# Patient Record
Sex: FEMALE | Race: WHITE | NOT HISPANIC OR LATINO | Employment: UNEMPLOYED | URBAN - METROPOLITAN AREA
[De-identification: names, ages, dates, MRNs, and addresses within clinical notes are randomized per-mention and may not be internally consistent; named-entity substitution may affect disease eponyms.]

---

## 2017-09-18 ENCOUNTER — LAB CONVERSION - ENCOUNTER (OUTPATIENT)
Dept: OTHER | Facility: OTHER | Age: 52
End: 2017-09-18

## 2017-09-18 ENCOUNTER — ALLSCRIPTS OFFICE VISIT (OUTPATIENT)
Dept: OTHER | Facility: OTHER | Age: 52
End: 2017-09-18

## 2017-09-18 ENCOUNTER — LAB REQUISITION (OUTPATIENT)
Dept: LAB | Facility: HOSPITAL | Age: 52
End: 2017-09-18
Payer: COMMERCIAL

## 2017-09-18 DIAGNOSIS — Z12.31 ENCOUNTER FOR SCREENING MAMMOGRAM FOR MALIGNANT NEOPLASM OF BREAST: ICD-10-CM

## 2017-09-18 DIAGNOSIS — Z01.419 ENCOUNTER FOR GYNECOLOGICAL EXAMINATION WITHOUT ABNORMAL FINDING: ICD-10-CM

## 2017-09-18 DIAGNOSIS — R30.0 DYSURIA: ICD-10-CM

## 2017-09-18 LAB
BILIRUB UR QL STRIP: NEGATIVE
COLOR UR: YELLOW
COMMENT (HISTORICAL): CLEAR
FECAL OCCULT BLOOD DIAGNOSTIC (HISTORICAL): NEGATIVE
GLUCOSE (HISTORICAL): NEGATIVE
KETONES UR STRIP-MCNC: NEGATIVE MG/DL
LEUKOCYTE ESTERASE UR QL STRIP: NEGATIVE
NITRITE UR QL STRIP: NEGATIVE
PH UR STRIP.AUTO: 8 [PH] (ref 5–8)
PROT UR STRIP-MCNC: NEGATIVE MG/DL
SP GR UR STRIP.AUTO: 1.01 (ref 1–1.03)

## 2017-09-18 PROCEDURE — G0145 SCR C/V CYTO,THINLAYER,RESCR: HCPCS | Performed by: OBSTETRICS & GYNECOLOGY

## 2017-09-18 PROCEDURE — 87624 HPV HI-RISK TYP POOLED RSLT: CPT | Performed by: OBSTETRICS & GYNECOLOGY

## 2017-09-19 ENCOUNTER — LAB CONVERSION - ENCOUNTER (OUTPATIENT)
Dept: OTHER | Facility: OTHER | Age: 52
End: 2017-09-19

## 2017-09-19 LAB
BILIRUB UR QL STRIP: NEGATIVE
COLOR UR: YELLOW
COMMENT (HISTORICAL): CLEAR
CULTURE RESULT (HISTORICAL): NORMAL
FECAL OCCULT BLOOD DIAGNOSTIC (HISTORICAL): NEGATIVE
GLUCOSE (HISTORICAL): NEGATIVE
KETONES UR STRIP-MCNC: NEGATIVE MG/DL
LEUKOCYTE ESTERASE UR QL STRIP: NEGATIVE
NITRITE UR QL STRIP: NEGATIVE
PH UR STRIP.AUTO: 8 [PH] (ref 5–8)
PROT UR STRIP-MCNC: NEGATIVE MG/DL
SP GR UR STRIP.AUTO: 1.01 (ref 1–1.03)

## 2017-09-22 LAB — HPV RRNA GENITAL QL NAA+PROBE: NORMAL

## 2017-09-25 LAB
LAB AP GYN PRIMARY INTERPRETATION: NORMAL
LAB AP LMP: NORMAL
Lab: NORMAL

## 2017-10-26 NOTE — PROGRESS NOTES
Assessment  1  Encounter for gynecological examination () (Z58 507)    Discussion/Summary    #1  Pap smear done as well as as annualEncouraged self breast examination as well as calcium supplementationRecommend annual mammogram, discussed 3-D mammography given breast density  Patient will check cc covered by her insuranceRecommend clean-catch urinalysis culture and sensitivity  If this is normal I would then recommend evaluation with urogynecology regarding stress urinary incontinenceReturn to office in one year  The patient has the current Goals: Goals met  The patent has the current Barriers: No barriers  Patient is able to Self-Care  Self Referrals: Yes      Chief Complaint  annual visit      History of Present Illness  HPI: This is a 36-year-old female  who presents for her annual GYN exam  She states her menstrual cycles are regular every 4 weeks lasting 3-5 days with no breakthrough bleeding  Over the last several months she's had bladder symptoms including difficulty voiding as well as stress urinary incontinence  She denies any blood in her urine  She does have a history of-year-old bowel syndrome  She is overdue for her mammogram  She's never had a screening colonoscopy  is sexually active and has been in a monogamous relationship with her  for the last 24 years  Review of Systems    Cardiovascular: no complaints of slow or fast heart rate, no chest pain, no palpitations, no leg claudication or lower extremity edema  Respiratory: no complaints of shortness of breath, no wheezing, no dyspnea on exertion, no orthopnea or PND  Breasts: no complaints of breast pain, breast lump or nipple discharge  Gastrointestinal: as noted in HPI  Genitourinary: as noted in HPI  Over the past 2 weeks, how often have you been bothered by the following problems? 1 ) Little interest or pleasure in doing things? Not at all    2 ) Feeling down, depressed or hopeless?  Not at all    3 ) Trouble falling asleep or sleeping too much? Not at all    4 ) Feeling tired or having little energy? Not at all    5 ) Poor appetite or overeating? Not at all    6 ) Feeling bad about yourself, or that you are a failure, or have let yourself or your family down? Not at all    7 ) Trouble concentrating on things, such as reading a newspaper or watching television? Not at all    8 ) Moving or speaking so slowly that other people could have noticed, or the opposite, moving or speaking faster than usual? Not at all    9 ) Thoughts that you would be better off dead or of hurting yourself in some way? 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)   5  Disease of hair and hair follicles (143 6) (H24 1,K04 8)   6  Dystonia, unspecified (781 0) (G24 9)   7  Encounter for gynecological examination (V72 31) (Z01 419)   8  Encounter for screening mammogram for breast cancer (V76 12) (Z12 31)   9  Endometriosis (617 9) (N80 9)   10  Family history of colon cancer (V16 0) (Z80 0)   11  Headache (784 0) (R51)   12  Idiopathic peripheral neuropathy (356 9) (G60 9)   13  Joint pain (719 40) (M25 50)   14  Lead poisoning (984 9,E980 9) (T56 0X1A)   15  Malabsorption syndrome (579 9) (K90 9)   16  Mild vitamin D deficiency (268 9) (E55 9)   17  New onset of headaches (784 0) (R51)   18  Noninfectious Dermatological Conditions (709 9)   19  Recent weight loss (783 21) (R63 4)   20  Renal function impairment (593 9) (N28 9)   21  Small fiber neuropathy (356 9) (G62 9)   22  TACS (trigeminal autonomic cephalgias) (339 09) (G44 099)   23   Visual disturbances (368 9) (H53 9)    Past Medical History   · No pertinent past medical history    The active problems and past medical history were reviewed and updated today  Surgical History   · History of Dilation And Curettage   · History of Hernia Repair   · History of Tonsillectomy    The surgical history was reviewed and updated today  Family History  Mother    · Denied: Family history of Colon cancer   · Denied: Family history of Crohn's disease without complication, unspecified  gastrointestinal tract location   · Family history of hypercholesterolemia (V18 19) (Z83 42)   · Family history of hypertension (V17 49) (Z82 49)   · Family history of liver disease (V18 59) (Z83 79)   · Family history of pancreatic cancer (V16 0) (Z80 0)  Father    · Family history of Colon cancer   · Family history of Colon cancer   · Denied: Family history of Crohn's disease without complication, unspecified  gastrointestinal tract location   · Denied: Family history of liver disease  Brother    · Family history of hypertension (V17 49) (Z82 49)    The family history was reviewed and updated today  Social History   · Denied: History of Alcohol use   · Denied: History of Drug use   · Former smoker (V15 82) (Y66 151)   · QUIT 28 YEARS AGO  The social history was reviewed and updated today  Allergies  1  Levaquin LEVA-tate TABS   2  Sulfa Drugs   3  Zithromax Z-Tate TABS   4  Penicillins    Vitals   Recorded: 26FIV3561 29:67YL   Systolic 480   Diastolic 72   Height 5 ft 2 in   Weight 96 lb    BMI Calculated 17 56   BSA Calculated 1 4   LMP 25-Aug-2017     Physical Exam    Constitutional   General appearance: No acute distress, well appearing and well nourished  Pulmonary   Auscultation of lungs: Clear to auscultation  Cardiovascular   Auscultation of heart: Normal rate and rhythm, normal S1 and S2, no murmurs  Genitourinary   External genitalia: Normal and no lesions appreciated  Vagina: Normal, no lesions or dryness appreciated      Urethral meatus: Normal  Cervix: Normal, no palpable masses  Uterus: Normal, non-tender, not enlarged, and no palpable masses  Adnexa/parametria: Normal, non-tender and no fullness or masses appreciated  Anus, perineum, and rectum: Normal sphincter tone, no masses, and no prolapse  Chest   Breasts: Normal and no dimpling or skin changes noted  Abdomen   Abdomen: Normal, non-tender, and no organomegaly noted         Signatures   Electronically signed by : Patrick Huynh DO; Sep 18 2017 11:13AM EST                       (Author)

## 2018-01-12 VITALS
BODY MASS INDEX: 17.66 KG/M2 | SYSTOLIC BLOOD PRESSURE: 110 MMHG | DIASTOLIC BLOOD PRESSURE: 72 MMHG | HEIGHT: 62 IN | WEIGHT: 96 LBS

## 2018-01-15 NOTE — MISCELLANEOUS
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)   5  Disease of hair and hair follicles (152 3) (Z52 1,A37 5)   6  Dystonia, unspecified (781 0) (G24 9)   7  Endometriosis (617 9) (N80 9)   8  Family history of colon cancer (V16 0) (Z80 0)   9  Headache (784 0) (R51)   10  Idiopathic peripheral neuropathy (356 9) (G60 9)   11  Joint pain (719 40) (M25 50)   12  Lead poisoning (984 9,E980 9) (T56 0X1A)   13  Malabsorption syndrome (579 9) (K90 9)   14  Mild vitamin D deficiency (268 9) (E55 9)   15  New onset of headaches (784 0) (R51)   16  Noninfectious Dermatological Conditions (709 9)   17  Recent weight loss (783 21) (R63 4)   18  Renal function impairment (593 9) (N28 9)   19  Small fiber neuropathy (356 9) (G62 9)   20  TACS (trigeminal autonomic cephalgias) (339 09) (G44 099)   21  Visual disturbances (368 9) (H53 9)    Current Meds   1  Copper Caps 2 MG Oral Capsule; take 1 capsule a day; Therapy: 22GFI6735 to (Last Rx:09Jun2016)  Requested for: 59XMD9635 Ordered   2  Indomethacin 25 MG Oral Capsule; TAKE 1 CAPSULE 3 TIMES DAILY WITH FOOD; Therapy: 28Apr2016 to (Abelardo Candy)  Requested for: 28Apr2016; Last   Rx:28Apr2016 Ordered   3  Suprep Bowel Prep Oral Solution; USE AS DIRECTED; Therapy: 98VQX3765 to (Last Rx:65Kvh8551)  Requested for: 09Lka3529 Ordered    Allergies    1  Levaquin LEVA-tate TABS   2  Sulfa Drugs   3  Zithromax Z-Tate TABS   4   Penicillins    Signatures   Electronically signed by : Surya Moore, ; Aug 22 2016  9:22AM EST                       (Author)

## 2018-01-15 NOTE — RESULT NOTES
Verified Results  THINPREP TIS PAP REFLEX HPV mRNA E6/E7 [id number] 12:00AM Nanci Conley     Test Name Result Flag Reference   CLINICAL INFORMATION:      none given   LMP:      901126   PREV  PAP:      NONE GIVEN   PREV  BX:      NONE GIVEN   SOURCE:      Cervix   STATEMENT OF ADEQUACY:      Satisfactory for evaluation  Endocervical/transformation zone component  present  GENERAL CATEGORIZATION:  A    EPITHELIAL CELL ABNORMALITY   INTERPRETATION/RESULT:  A    Atypical Squamous Cells of Undetermined  Significance (ASC-US)   COMMENT:      This Pap test has been evaluated with computer  assisted technology    Suggest clinical correlation and follow-up as  clinically appropriate   CYTOTECHNOLOGIST:      ADRIÁN ARRIAGA(ASCP)  CT screening location: [address] [place] [id number]   PATHOLOGIST:      Courtney Forte MD, (electronic signature)  Boarded in Anatomic and Clinical Pathology,  Cytopathology

## 2018-05-31 ENCOUNTER — OFFICE VISIT (OUTPATIENT)
Dept: INTERNAL MEDICINE CLINIC | Facility: CLINIC | Age: 53
End: 2018-05-31
Payer: COMMERCIAL

## 2018-05-31 VITALS
RESPIRATION RATE: 18 BRPM | DIASTOLIC BLOOD PRESSURE: 86 MMHG | BODY MASS INDEX: 18.84 KG/M2 | OXYGEN SATURATION: 98 % | TEMPERATURE: 98 F | WEIGHT: 102.4 LBS | HEART RATE: 78 BPM | HEIGHT: 62 IN | SYSTOLIC BLOOD PRESSURE: 142 MMHG

## 2018-05-31 DIAGNOSIS — E55.9 MILD VITAMIN D DEFICIENCY: Primary | ICD-10-CM

## 2018-05-31 DIAGNOSIS — G44.099 TACS (TRIGEMINAL AUTONOMIC CEPHALGIAS): ICD-10-CM

## 2018-05-31 DIAGNOSIS — Z12.11 SCREENING FOR COLON CANCER: ICD-10-CM

## 2018-05-31 DIAGNOSIS — E61.0 COPPER DEFICIENCY: ICD-10-CM

## 2018-05-31 DIAGNOSIS — R73.01 ABNORMAL FASTING GLUCOSE: ICD-10-CM

## 2018-05-31 DIAGNOSIS — K59.09 OTHER CONSTIPATION: ICD-10-CM

## 2018-05-31 DIAGNOSIS — R39.198 DIFFICULTY IN URINATION: ICD-10-CM

## 2018-05-31 DIAGNOSIS — E78.49 OTHER HYPERLIPIDEMIA: ICD-10-CM

## 2018-05-31 PROBLEM — K21.9 GERD (GASTROESOPHAGEAL REFLUX DISEASE): Status: ACTIVE | Noted: 2018-05-31

## 2018-05-31 PROBLEM — M53.3 COCCYGEAL PAIN: Status: ACTIVE | Noted: 2018-05-31

## 2018-05-31 PROBLEM — K59.00 CONSTIPATION: Status: ACTIVE | Noted: 2018-05-31

## 2018-05-31 PROBLEM — M94.0 COSTOCHONDRITIS: Status: ACTIVE | Noted: 2018-05-31

## 2018-05-31 PROBLEM — J30.1 SEASONAL ALLERGIC RHINITIS DUE TO POLLEN: Status: ACTIVE | Noted: 2018-05-31

## 2018-05-31 PROCEDURE — 99204 OFFICE O/P NEW MOD 45 MIN: CPT | Performed by: INTERNAL MEDICINE

## 2018-05-31 RX ORDER — DIPHENOXYLATE HYDROCHLORIDE AND ATROPINE SULFATE 2.5; .025 MG/1; MG/1
1 TABLET ORAL DAILY
COMMUNITY
End: 2020-11-06

## 2018-05-31 NOTE — PROGRESS NOTES
Assessment/Plan:    Difficulty in urination  Refer back to gynecology, may need urology referral   Large fibroid seen on MRI 2015  Costochondritis  Reassured  GERD (gastroesophageal reflux disease)  Instructed to take ranitidine QHS  Constipation  Recommend to try taking half dose of MiraLax or Metamucil once a day for the next few days for a good bowel movement  Coccygeal pain  Intermittent symptoms, instructed to use soft cushion when seated  Copper deficiency  Saw neurology in the past, did not take copper supplements  Seasonal allergic rhinitis due to pollen  Instructed to use saline nasal spray daily  Other hyperlipidemia  Mildly elevated total cholesterol, not on medication  Diagnoses and all orders for this visit:    Mild vitamin D deficiency  -     Vitamin D 25 hydroxy    Other hyperlipidemia  -     CBC and differential  -     Comprehensive metabolic panel  -     Lipid panel  -     TSH, 3rd generation with T4 reflex    Difficulty in urination    Other constipation    Abnormal fasting glucose  -     HEMOGLOBIN A1C W/ EAG ESTIMATION    Copper deficiency  -     Vitamin B12  -     Copper Level; Future  -     Zinc; Future    TACS (trigeminal autonomic cephalgias)    Screening for colon cancer  -     Ambulatory referral to Gastroenterology; Future    Other orders  -     multivitamin (THERAGRAN) TABS; Take 1 tablet by mouth daily      Follow up in 4 months or as needed  Subjective:      Patient ID: Lio Stroud is a 47 y o  female  Mrs Simón Mike complains of difficulty urinating  Symptoms started a few months ago, went to her gynecologist   She had a UA normal urine test   Since change of insurance, she did not follow up  She reports that she needs to cough or push to fully empty her bladder  Denies any incontinence, dysuria or hematuria  Denies any pelvic discomfort or lower abdominal pain  She was diagnosed with a large fibroid as seen in her MRI last 2015    She also reports that she used to be constipated in the past, with small hard stool  Recently, she moves her bowels regularly with no over-the-counter medication  She was told she had a lot of stool in her colon on imaging  She was instructed to try MiraLax which she never did  One GI doctor told her she has kink in her colon  She was scheduled for a colonoscopy which she canceled  She has occasional pain in her tailbone  She was told she had a cyst   Pain worse when standing up or if she has slouching, rare pain when seated or asleep  She also complains of occasional left chest pain, points to the spot  This has been ongoing, intermittent  No accompanying symptoms such as dizziness, chest heaviness, shortness of breath or palpitations  She also complains of frequent acid taste in her mouth, worse at night  She is reluctant to take any medication for this  The following portions of the patient's history were reviewed and updated as appropriate: allergies, current medications, past family history, past medical history, past social history, past surgical history and problem list     Review of Systems   Constitutional: Negative for appetite change and fatigue  HENT: Positive for congestion  Negative for ear pain and postnasal drip  Eyes: Negative for visual disturbance  Respiratory: Negative for cough, shortness of breath and wheezing  Cardiovascular: Negative for chest pain, palpitations and leg swelling  Gastrointestinal: Positive for nausea  Negative for abdominal pain, constipation and diarrhea  Genitourinary: Positive for difficulty urinating  Negative for dysuria, frequency and urgency  Musculoskeletal: Positive for arthralgias  Negative for gait problem and myalgias  Skin: Negative for rash and wound  Allergic/Immunologic: Positive for environmental allergies  Neurological: Negative for dizziness, numbness and headaches  Hematological: Does not bruise/bleed easily  Psychiatric/Behavioral: Negative for confusion  The patient is not nervous/anxious  Objective:      /86   Pulse 78   Temp 98 °F (36 7 °C) (Oral)   Resp 18   Ht 5' 2" (1 575 m)   Wt 46 4 kg (102 lb 6 4 oz)   SpO2 98%   BMI 18 73 kg/m²          Physical Exam   Constitutional: She is oriented to person, place, and time  She appears well-developed and well-nourished  HENT:   Head: Normocephalic and atraumatic  Right Ear: Tympanic membrane and external ear normal    Left Ear: Tympanic membrane and external ear normal    Nose: Rhinorrhea present  Mouth/Throat: Uvula is midline, oropharynx is clear and moist and mucous membranes are normal    Eyes: Conjunctivae are normal  Pupils are equal, round, and reactive to light  Neck: Neck supple  No thyroid mass present  Cardiovascular: Normal rate, regular rhythm and normal heart sounds  No edema  Pulmonary/Chest: Effort normal and breath sounds normal  She has no wheezes  She has no rhonchi  Abdominal: Soft  Bowel sounds are normal  There is no tenderness  No hernia  Musculoskeletal: Normal range of motion  No joint pain or swelling   Lymphadenopathy:     She has no cervical adenopathy  Right: No supraclavicular adenopathy present  Left: No supraclavicular adenopathy present  Neurological: She is alert and oriented to person, place, and time  No cranial nerve deficit  Skin: Skin is warm  No rash noted  Psychiatric: She has a normal mood and affect  Her behavior is normal    Nursing note and vitals reviewed  Reviewed available records

## 2018-05-31 NOTE — ASSESSMENT & PLAN NOTE
Recommend to try taking half dose of MiraLax or Metamucil once a day for the next few days for a good bowel movement

## 2018-05-31 NOTE — PATIENT INSTRUCTIONS
Try taking 1/2 dose of Miralax once a day OR Metamucil once a day  Call gynecology and GI for an appointment  Use saline nasal spray daily for allergies

## 2018-06-09 LAB
25(OH)D3 SERPL-MCNC: 78 NG/ML (ref 30–100)
ALBUMIN SERPL-MCNC: 4.1 G/DL (ref 3.6–5.1)
ALBUMIN/GLOB SERPL: 1.8 (CALC) (ref 1–2.5)
ALP SERPL-CCNC: 57 U/L (ref 33–130)
ALT SERPL-CCNC: 10 U/L (ref 6–29)
AST SERPL-CCNC: 16 U/L (ref 10–35)
BASOPHILS # BLD AUTO: 20 CELLS/UL (ref 0–200)
BASOPHILS NFR BLD AUTO: 0.4 %
BILIRUB SERPL-MCNC: 0.7 MG/DL (ref 0.2–1.2)
BUN SERPL-MCNC: 15 MG/DL (ref 7–25)
BUN/CREAT SERPL: NORMAL (CALC) (ref 6–22)
CALCIUM SERPL-MCNC: 9 MG/DL (ref 8.6–10.4)
CHLORIDE SERPL-SCNC: 100 MMOL/L (ref 98–110)
CHOLEST SERPL-MCNC: 197 MG/DL
CHOLEST/HDLC SERPL: 2.6 (CALC)
CO2 SERPL-SCNC: 27 MMOL/L (ref 20–31)
COPPER SERPL-MCNC: 106 MCG/DL (ref 70–175)
CREAT SERPL-MCNC: 0.88 MG/DL (ref 0.5–1.05)
EOSINOPHIL # BLD AUTO: 61 CELLS/UL (ref 15–500)
EOSINOPHIL NFR BLD AUTO: 1.2 %
ERYTHROCYTE [DISTWIDTH] IN BLOOD BY AUTOMATED COUNT: 12.1 % (ref 11–15)
EST. AVERAGE GLUCOSE BLD GHB EST-MCNC: 94 (CALC)
EST. AVERAGE GLUCOSE BLD GHB EST-SCNC: 5.2 (CALC)
GLOBULIN SER CALC-MCNC: 2.3 G/DL (CALC) (ref 1.9–3.7)
GLUCOSE SERPL-MCNC: 76 MG/DL (ref 65–99)
HBA1C MFR BLD: 4.9 % OF TOTAL HGB
HCT VFR BLD AUTO: 42.3 % (ref 35–45)
HDLC SERPL-MCNC: 75 MG/DL
HGB BLD-MCNC: 14.2 G/DL (ref 11.7–15.5)
LDLC SERPL CALC-MCNC: 109 MG/DL (CALC)
LYMPHOCYTES # BLD AUTO: 1953 CELLS/UL (ref 850–3900)
LYMPHOCYTES NFR BLD AUTO: 38.3 %
MCH RBC QN AUTO: 31.5 PG (ref 27–33)
MCHC RBC AUTO-ENTMCNC: 33.6 G/DL (ref 32–36)
MCV RBC AUTO: 93.8 FL (ref 80–100)
MONOCYTES # BLD AUTO: 474 CELLS/UL (ref 200–950)
MONOCYTES NFR BLD AUTO: 9.3 %
NEUTROPHILS # BLD AUTO: 2591 CELLS/UL (ref 1500–7800)
NEUTROPHILS NFR BLD AUTO: 50.8 %
NONHDLC SERPL-MCNC: 122 MG/DL (CALC)
PLATELET # BLD AUTO: 202 THOUSAND/UL (ref 140–400)
PMV BLD REES-ECKER: 12 FL (ref 7.5–12.5)
POTASSIUM SERPL-SCNC: 4 MMOL/L (ref 3.5–5.3)
PROT SERPL-MCNC: 6.4 G/DL (ref 6.1–8.1)
RBC # BLD AUTO: 4.51 MILLION/UL (ref 3.8–5.1)
SL AMB EGFR AFRICAN AMERICAN: 88 ML/MIN/1.73M2
SL AMB EGFR NON AFRICAN AMERICAN: 76 ML/MIN/1.73M2
SODIUM SERPL-SCNC: 136 MMOL/L (ref 135–146)
TRIGL SERPL-MCNC: 51 MG/DL
TSH SERPL-ACNC: 1.36 MIU/L
VIT B12 SERPL-MCNC: 761 PG/ML (ref 200–1100)
WBC # BLD AUTO: 5.1 THOUSAND/UL (ref 3.8–10.8)
ZINC SERPL-MCNC: 74 MCG/DL (ref 60–130)

## 2018-06-11 ENCOUNTER — TELEPHONE (OUTPATIENT)
Dept: GASTROENTEROLOGY | Facility: AMBULARY SURGERY CENTER | Age: 53
End: 2018-06-11

## 2018-06-13 ENCOUNTER — TELEPHONE (OUTPATIENT)
Dept: GASTROENTEROLOGY | Facility: AMBULARY SURGERY CENTER | Age: 53
End: 2018-06-13

## 2018-06-15 ENCOUNTER — TELEPHONE (OUTPATIENT)
Dept: INTERNAL MEDICINE CLINIC | Facility: CLINIC | Age: 53
End: 2018-06-15

## 2018-06-15 NOTE — TELEPHONE ENCOUNTER
Cholesterol slightly elevated, should be on low fat diet  Copper and zinc levels within normal  Continue B12 and D supplements  Rest of labs are normal including thyroid and sugars

## 2018-06-25 ENCOUNTER — OFFICE VISIT (OUTPATIENT)
Dept: OBGYN CLINIC | Facility: CLINIC | Age: 53
End: 2018-06-25
Payer: COMMERCIAL

## 2018-06-25 VITALS
DIASTOLIC BLOOD PRESSURE: 80 MMHG | WEIGHT: 105 LBS | SYSTOLIC BLOOD PRESSURE: 122 MMHG | HEIGHT: 62 IN | BODY MASS INDEX: 19.32 KG/M2

## 2018-06-25 DIAGNOSIS — R10.2 PELVIC PRESSURE IN FEMALE: Primary | ICD-10-CM

## 2018-06-25 PROCEDURE — 99213 OFFICE O/P EST LOW 20 MIN: CPT | Performed by: OBSTETRICS & GYNECOLOGY

## 2018-06-25 NOTE — PROGRESS NOTES
Assessment/Plan:    Reviewed prior imaging studies  Offered again to repeat MRI to delineate possibility of fibroid uterus, although this would not change our management  Patient declines any further testing  Regarding her difficulty to completely evacuating her bladder I did offer urinalysis although she has no other signs of infection  I then offered Urology consultation for further evaluation  Patient would like to further evaluate bowel complaints and is already scheduled  She will keep a menstrual diary as we did review adrianna menopausal symptoms  She will return to office in September for annual gyn exam    No problem-specific Assessment & Plan notes found for this encounter  Diagnoses and all orders for this visit:    Pelvic pressure in female    Other orders  -     Calcium-Magnesium-Vitamin D (CALCIUM MAGNESIUM PO); one tablet by mouth daily          Subjective:      Patient ID: Mara Ovalles is a 47 y o  female  HPI     This is a 71-year-old female  who presents for evaluation after being seen by her family physician for follow-up fibroid uterus  Patient has been complaining of bladder discomfort in difficulty in emptying her bladder  She is voiding frequently on a daily basis  She denies any hematuria or dysuria  She has a long history of bowel complaints including constipation, diarrhea, episodes of fecal incontinence  She has never had a screening colonoscopy  She states that she also has an appointment with Gastroenterology for screening colonoscopy  Patient's last gyn exam was 2017  Her cycles are regular then  She states they become more irregular where now she is 2 months late for her cycle  She denies any hot flashes although she does have temperature changes  Patient's last imaging test of the uterus was 2015 which had revealed no evidence of discrete fibroid    At that time we had discussed proceeding with pelvic MRI although this would not change our management as her uterus was not enlarged  The following portions of the patient's history were reviewed and updated as appropriate: allergies, current medications, past family history, past medical history, past social history, past surgical history and problem list     Review of Systems   Constitutional: Negative for fatigue, fever and unexpected weight change  Respiratory: Negative for cough, chest tightness, shortness of breath and wheezing  Cardiovascular: Negative  Negative for chest pain and palpitations  Gastrointestinal: Negative  Negative for abdominal distention, abdominal pain, blood in stool, constipation, diarrhea, nausea and vomiting  Genitourinary: Positive for difficulty urinating  Negative for dyspareunia, dysuria, flank pain, frequency, genital sores, hematuria, pelvic pain, urgency, vaginal bleeding, vaginal discharge and vaginal pain  Skin: Negative for rash  Objective:      /80   Ht 5' 2" (1 575 m)   Wt 47 6 kg (105 lb)   LMP 05/09/2018 (Exact Date)   Breastfeeding? No   BMI 19 20 kg/m²          Physical Exam      External genitalia is within normal limits  The vagina is well estrogenized  Cervix is small the os is closed  There is no evidence of prolapse  Uterus is normal size retroverted with no palpable adnexal masses  Rectovaginal exam is confirmatory

## 2018-08-09 ENCOUNTER — OFFICE VISIT (OUTPATIENT)
Dept: GASTROENTEROLOGY | Facility: AMBULARY SURGERY CENTER | Age: 53
End: 2018-08-09
Payer: COMMERCIAL

## 2018-08-09 VITALS
BODY MASS INDEX: 19.1 KG/M2 | RESPIRATION RATE: 18 BRPM | HEART RATE: 77 BPM | HEIGHT: 62 IN | WEIGHT: 103.8 LBS | TEMPERATURE: 97 F | DIASTOLIC BLOOD PRESSURE: 80 MMHG | SYSTOLIC BLOOD PRESSURE: 124 MMHG

## 2018-08-09 DIAGNOSIS — Z12.11 SCREENING FOR COLON CANCER: ICD-10-CM

## 2018-08-09 DIAGNOSIS — R68.81 EARLY SATIETY: Primary | ICD-10-CM

## 2018-08-09 DIAGNOSIS — K21.9 GASTROESOPHAGEAL REFLUX DISEASE WITHOUT ESOPHAGITIS: ICD-10-CM

## 2018-08-09 DIAGNOSIS — K59.09 OTHER CONSTIPATION: ICD-10-CM

## 2018-08-09 PROCEDURE — 99214 OFFICE O/P EST MOD 30 MIN: CPT | Performed by: INTERNAL MEDICINE

## 2018-08-09 NOTE — PROGRESS NOTES
[address] [facility]  Flaquito Jeffers 47 y o  female MRN: [id number]            Assessment & Plan:    Pleasant 41-year-old female with a history of chronic NSAID use for complains of early satiety, reflux, epigastric abdominal pain, weight loss with family history of colon cancer in her father  1   Epigastric abdominal pain and weight loss:  Overall concerning however peptic ulcer disease, stricture, delayed gastric emptying on the differential  -given her history of pancreatic cancer in her mother and weight loss will proceed with a CT scan  -we will schedule an upper endoscopy as well    2  Constipation, change in bowel movements and weight loss:  -we will proceed with colonoscopy given family history of colon cancer to exclude luminal pathology  -we will check a BMP    I discussed with her the risks of the procedures including bleeding, surgery, perforation, missed polyp detection rate        _____________________________________________________________        CC:  Multiple GI complaints    HPI:  Flaquito Jeffers is a 47 y  o female who is here for evaluation of multiple GI complaints  This is a 41-year-old female, we had actually seen her 2 years ago when she had complaints of abdominal pain, nausea and constipation  At that time her mother and father had passed away from pancreatic and colonic cancer, respectively  She reports that she was afraid to follow-up with procedures and has not been seen since then  She has been taking significant amounts of NSAIDs over the past 2 years and reports that she has had worsening epigastric abdominal pain, retrosternal discomfort reflux symptoms  Reflux is typically worse at night, not more recently she has had early satiety and worsening constipation with this sensation of incomplete evacuation  She has occasional mucus in her stools  She notes that most her abdominal pain is worse before eating    She has had some anxiety and has significant arthritic pains for which she takes NSAIDs  Her weight had initially been decreased by about 9 or 10 lb about a year ago she has been able to stabilize some of her weight  She had attributed much of that to grieving from the loss of her parents  Is also history of colon polyps in her brother  She works in keeping records for [employer]  She denies any tobacco or alcohol  She has several allergies medications  ROS:  The remainder of the ROS was negative except for the pertinent positives mentioned in HPI  Allergies: Sulfa antibiotics; Neomycin; Penicillins; Azithromycin; and Levaquin [levofloxacin]    Medications:   Current Outpatient Prescriptions:     Calcium-Magnesium-Vitamin D (CALCIUM MAGNESIUM PO), one tablet by mouth daily, Disp: , Rfl:     multivitamin (THERAGRAN) TABS, Take 1 tablet by mouth daily, Disp: , Rfl:     Na Sulfate-K Sulfate-Mg Sulf (SUPREP BOWEL PREP KIT) 17 5-3 13-1 6 GM/180ML SOLN, Take 1 Bottle by mouth once for 1 dose, Disp: 1 Bottle, Rfl: 0    Past Medical History:   Diagnosis Date    Allergic        Past Surgical History:   Procedure Laterality Date    DILATION AND CURETTAGE OF UTERUS      HERNIA REPAIR      inguinal, left    TONSILLECTOMY         Family History   Problem Relation Age of Onset    Hyperlipidemia Mother     Hypertension Mother     Liver disease Mother     Pancreatic cancer Mother     Mental illness Mother     Colon cancer Father 79    Hypertension Brother     Alcohol abuse Brother     Mental illness Brother     Heart disease Maternal Grandmother     Dementia Maternal Grandmother     Heart disease Maternal Grandfather     Skin cancer Paternal Grandmother     Heart disease Maternal Uncle         reports that she has quit smoking  She has a 16 00 pack-year smoking history  She has never used smokeless tobacco  She reports that she does not drink alcohol or use drugs        Physical Exam:    /80 (BP Location: Left arm, Patient Position: Sitting, Cuff Size: Standard)   Pulse 77   Temp (!) 97 °F (36 1 °C) (Tympanic)   Resp 18   Ht 5' 2" (1 575 m)   Wt 47 1 kg (103 lb 12 8 oz)   BMI 18 99 kg/m²     Gen: wn/wd, NAD, thin female  HEENT: anicteric, MMM, no cervical LAD  CVS: RRR, no m/r/g  CHEST: CTA b/l  ABD: +BS, soft, epigastric pain and fullness no hepatosplenomegaly  EXT: no c/c/e  NEURO: aaox3  SKIN: NO rashes

## 2018-08-09 NOTE — LETTER
August 9, 2018     Naresh Jauregui MD  [address]  [id number] [place] [address]    Patient: Barbara Singh   YOB: 1965   Date of Visit: 8/9/2018       Dear Dr Shante Reis: Thank you for referring Radha Chiu to me for evaluation  Below are my notes for this consultation  If you have questions, please do not hesitate to call me  I look forward to following your patient along with you  Sincerely,        Suleiman Elmore MD        CC: No Recipients  Suleiman Elmore MD  8/9/2018  5:29 PM  Sign at close encounter  [address] [facility]  Barbara Singh 47 y o  female MRN: [id number]            Assessment & Plan:    Pleasant 52-year-old female with a history of chronic NSAID use for complains of early satiety, reflux, epigastric abdominal pain, weight loss with family history of colon cancer in her father  1   Epigastric abdominal pain and weight loss:  Overall concerning however peptic ulcer disease, stricture, delayed gastric emptying on the differential  -given her history of pancreatic cancer in her mother and weight loss will proceed with a CT scan  -we will schedule an upper endoscopy as well    2  Constipation, change in bowel movements and weight loss:  -we will proceed with colonoscopy given family history of colon cancer to exclude luminal pathology  -we will check a BMP    I discussed with her the risks of the procedures including bleeding, surgery, perforation, missed polyp detection rate        _____________________________________________________________        CC:  Multiple GI complaints    HPI:  Barbara Singh is a 47 y  o female who is here for evaluation of multiple GI complaints  This is a 52-year-old female, we had actually seen her 2 years ago when she had complaints of abdominal pain, nausea and constipation  At that time her mother and father had passed away from pancreatic and colonic cancer, respectively    She reports that she was afraid to follow-up with procedures and has not been seen since then  She has been taking significant amounts of NSAIDs over the past 2 years and reports that she has had worsening epigastric abdominal pain, retrosternal discomfort reflux symptoms  Reflux is typically worse at night, not more recently she has had early satiety and worsening constipation with this sensation of incomplete evacuation  She has occasional mucus in her stools  She notes that most her abdominal pain is worse before eating  She has had some anxiety and has significant arthritic pains for which she takes NSAIDs  Her weight had initially been decreased by about 9 or 10 lb about a year ago she has been able to stabilize some of her weight  She had attributed much of that to grieving from the loss of her parents  Is also history of colon polyps in her brother  She works in keeping records for [employer]  She denies any tobacco or alcohol  She has several allergies medications  ROS:  The remainder of the ROS was negative except for the pertinent positives mentioned in HPI  Allergies: Sulfa antibiotics; Neomycin;  Penicillins; Azithromycin; and Levaquin [levofloxacin]    Medications:   Current Outpatient Prescriptions:     Calcium-Magnesium-Vitamin D (CALCIUM MAGNESIUM PO), one tablet by mouth daily, Disp: , Rfl:     multivitamin (THERAGRAN) TABS, Take 1 tablet by mouth daily, Disp: , Rfl:     Na Sulfate-K Sulfate-Mg Sulf (SUPREP BOWEL PREP KIT) 17 5-3 13-1 6 GM/180ML SOLN, Take 1 Bottle by mouth once for 1 dose, Disp: 1 Bottle, Rfl: 0    Past Medical History:   Diagnosis Date    Allergic        Past Surgical History:   Procedure Laterality Date    DILATION AND CURETTAGE OF UTERUS      HERNIA REPAIR      inguinal, left    TONSILLECTOMY         Family History   Problem Relation Age of Onset    Hyperlipidemia Mother     Hypertension Mother     Liver disease Mother     Pancreatic cancer Mother     Mental illness Mother  Colon cancer Father 79    Hypertension Brother     Alcohol abuse Brother     Mental illness Brother     Heart disease Maternal Grandmother     Dementia Maternal Grandmother     Heart disease Maternal Grandfather     Skin cancer Paternal Grandmother     Heart disease Maternal Uncle         reports that she has quit smoking  She has a 16 00 pack-year smoking history  She has never used smokeless tobacco  She reports that she does not drink alcohol or use drugs        Physical Exam:    /80 (BP Location: Left arm, Patient Position: Sitting, Cuff Size: Standard)   Pulse 77   Temp (!) 97 °F (36 1 °C) (Tympanic)   Resp 18   Ht 5' 2" (1 575 m)   Wt 47 1 kg (103 lb 12 8 oz)   BMI 18 99 kg/m²      Gen: wn/wd, NAD, thin female  HEENT: anicteric, MMM, no cervical LAD  CVS: RRR, no m/r/g  CHEST: CTA b/l  ABD: +BS, soft, epigastric pain and fullness no hepatosplenomegaly  EXT: no c/c/e  NEURO: aaox3  SKIN: NO rashes

## 2018-08-16 ENCOUNTER — TELEPHONE (OUTPATIENT)
Dept: GASTROENTEROLOGY | Facility: CLINIC | Age: 53
End: 2018-08-16

## 2018-08-16 NOTE — TELEPHONE ENCOUNTER
Dr Villarreal First pt     Pt called in to advise she had her bloodwork done at [facility] on Monday for her CT on Saturday, she would like to know if the results were ok for her to have her CT  Please advise   [employer] phone# is [phone]

## 2018-08-18 ENCOUNTER — HOSPITAL ENCOUNTER (OUTPATIENT)
Dept: CT IMAGING | Facility: HOSPITAL | Age: 53
Discharge: HOME | End: 2018-08-18
Attending: INTERNAL MEDICINE
Payer: COMMERCIAL

## 2018-08-18 DIAGNOSIS — K59.09 OTHER CONSTIPATION: ICD-10-CM

## 2018-08-18 PROCEDURE — 74177 CT ABD & PELVIS W/CONTRAST: CPT

## 2018-08-18 RX ADMIN — IOHEXOL 100 ML: 350 INJECTION, SOLUTION INTRAVENOUS at 10:10

## 2018-08-23 ENCOUNTER — TELEPHONE (OUTPATIENT)
Dept: GASTROENTEROLOGY | Facility: CLINIC | Age: 53
End: 2018-08-23

## 2018-08-23 NOTE — TELEPHONE ENCOUNTER
----- Message from Loly Singer MD sent at 8/23/2018 12:24 PM EDT -----  Please inform the patient that her CT scan does not show any concerning findings  She has 2 small cysts in her liver which are unchanged  These may also be hemangiomas which are little blood vessels but are not concerning  Her colon showed some mild thickening but no other concerning findings  Will see the patient at her upcoming endoscopy and colonoscopy  Please have her call with any questions or concerns

## 2018-09-01 ENCOUNTER — ANESTHESIA EVENT (OUTPATIENT)
Dept: PERIOP | Facility: AMBULARY SURGERY CENTER | Age: 53
End: 2018-09-01
Payer: COMMERCIAL

## 2018-09-12 ENCOUNTER — HOSPITAL ENCOUNTER (OUTPATIENT)
Facility: AMBULARY SURGERY CENTER | Age: 53
Setting detail: OUTPATIENT SURGERY
Discharge: HOME | End: 2018-09-12
Attending: INTERNAL MEDICINE | Admitting: INTERNAL MEDICINE
Payer: COMMERCIAL

## 2018-09-12 ENCOUNTER — ANESTHESIA (OUTPATIENT)
Dept: PERIOP | Facility: AMBULARY SURGERY CENTER | Age: 53
End: 2018-09-12
Payer: COMMERCIAL

## 2018-09-12 VITALS
TEMPERATURE: 97.2 F | SYSTOLIC BLOOD PRESSURE: 122 MMHG | DIASTOLIC BLOOD PRESSURE: 71 MMHG | HEIGHT: 62 IN | WEIGHT: 100 LBS | HEART RATE: 83 BPM | BODY MASS INDEX: 18.4 KG/M2 | OXYGEN SATURATION: 100 % | RESPIRATION RATE: 18 BRPM

## 2018-09-12 DIAGNOSIS — Z12.11 SCREENING FOR COLON CANCER: ICD-10-CM

## 2018-09-12 DIAGNOSIS — R68.81 EARLY SATIETY: ICD-10-CM

## 2018-09-12 DIAGNOSIS — K21.9 GASTROESOPHAGEAL REFLUX DISEASE WITHOUT ESOPHAGITIS: ICD-10-CM

## 2018-09-12 DIAGNOSIS — K59.09 OTHER CONSTIPATION: ICD-10-CM

## 2018-09-12 LAB — EXT PREGNANCY TEST URINE: NEGATIVE

## 2018-09-12 PROCEDURE — 88342 IMHCHEM/IMCYTCHM 1ST ANTB: CPT | Performed by: PATHOLOGY

## 2018-09-12 PROCEDURE — 81025 URINE PREGNANCY TEST: CPT | Performed by: INTERNAL MEDICINE

## 2018-09-12 PROCEDURE — 88305 TISSUE EXAM BY PATHOLOGIST: CPT | Performed by: PATHOLOGY

## 2018-09-12 PROCEDURE — 43239 EGD BIOPSY SINGLE/MULTIPLE: CPT | Performed by: INTERNAL MEDICINE

## 2018-09-12 PROCEDURE — 45378 DIAGNOSTIC COLONOSCOPY: CPT | Performed by: INTERNAL MEDICINE

## 2018-09-12 RX ORDER — SODIUM CHLORIDE 9 MG/ML
100 INJECTION, SOLUTION INTRAVENOUS CONTINUOUS
Status: DISCONTINUED | OUTPATIENT
Start: 2018-09-12 | End: 2018-09-12 | Stop reason: HOSPADM

## 2018-09-12 RX ORDER — PROPOFOL 10 MG/ML
INJECTION, EMULSION INTRAVENOUS AS NEEDED
Status: DISCONTINUED | OUTPATIENT
Start: 2018-09-12 | End: 2018-09-12 | Stop reason: SURG

## 2018-09-12 RX ADMIN — PROPOFOL 150 MG: 10 INJECTION, EMULSION INTRAVENOUS at 10:25

## 2018-09-12 RX ADMIN — PROPOFOL 30 MG: 10 INJECTION, EMULSION INTRAVENOUS at 10:33

## 2018-09-12 RX ADMIN — SODIUM CHLORIDE 100 ML/HR: 0.9 INJECTION, SOLUTION INTRAVENOUS at 09:41

## 2018-09-12 RX ADMIN — PROPOFOL 40 MG: 10 INJECTION, EMULSION INTRAVENOUS at 10:28

## 2018-09-12 RX ADMIN — PROPOFOL 40 MG: 10 INJECTION, EMULSION INTRAVENOUS at 10:30

## 2018-09-12 RX ADMIN — LIDOCAINE HYDROCHLORIDE 100 MG: 20 INJECTION, SOLUTION INTRAVENOUS at 10:25

## 2018-09-12 NOTE — ANESTHESIA PREPROCEDURE EVALUATION
Review of Systems/Medical History  Patient summary reviewed  Chart reviewed  No history of anesthetic complications     Cardiovascular  Exercise tolerance (METS): >4,  Hyperlipidemia,   Comment: Pt reports hx trace AI/MR,  Pulmonary  Negative pulmonary ROS No sleep apnea ,        GI/Hepatic    GERD , Bowel prep       Negative  ROS        Endo/Other  Negative endo/other ROS      GYN  Negative gynecology ROS Not currently pregnant ,          Hematology  Negative hematology ROS      Musculoskeletal  Negative musculoskeletal ROS        Neurology  Negative neurology ROS      Psychology   Negative psychology ROS              Physical Exam    Airway    Mallampati score: I  TM Distance: >3 FB  Neck ROM: full     Dental   No notable dental hx     Cardiovascular      Pulmonary      Other Findings       Lab Results   Component Value Date    WBC 5 1 06/07/2018    HGB 14 2 06/07/2018     06/07/2018     Lab Results   Component Value Date    BUN 15 06/07/2018    CREATININE 0 88 06/07/2018     Lab Results   Component Value Date    HGBA1C 4 9 06/07/2018     Anesthesia Plan  ASA Score- 1     Anesthesia Type- IV sedation with anesthesia with ASA Monitors  Additional Monitors:   Airway Plan:         Plan Factors-    Induction- intravenous  Postoperative Plan-     Informed Consent- Anesthetic plan and risks discussed with patient and spouse  I personally reviewed this patient with the CRNA  Discussed and agreed on the Anesthesia Plan with the CRNA  Geeta Snowden

## 2018-09-12 NOTE — DISCHARGE INSTR - AVS FIRST PAGE
ESOPHAGOGASTRODUODENOSCOPY    PROCEDURE: EGD    SEDATION: Monitored anesthesia care, check anesthesia records    ASA Class: 2    INDICATIONS:   Epigastric abdominal pain, early satiety    CONSENT:  Informed consent was obtained for the procedure, including sedation after explaining the risks and benefits of the procedure  Risks including but not limited to bleeding, perforation, infection, and missed lesion  PREPARATION:   Telemetry, pulse oximetry, blood pressure were monitored throughout the procedure  Patient was identified by myself both verbally and by visual inspection of ID band  DESCRIPTION:   Patient was placed in the left lateral decubitus position and was sedated with the above medication  The gastroscope was introduced in to the oropharynx and the esophagus was intubated under direct visualization  Scope was passed down the esophagus up to 2nd part of the duodenum  A careful inspection was made as the gastroscope was withdrawn, including a retroflexed view of the stomach; findings and interventions are described below  FINDINGS:    #1  Esophagus- small nodule GE junction, biopsies taken, no significant esophagitis noted    #2  Stomach- mild gastritis biopsies taken  Normal retroflexion    #3  Duodenum- normal duodenum, random biopsies taken         IMPRESSIONS:      Normal duodenum biopsied  Gastritis biopsied  Small esophageal nodule, likely secondary to esophagitis or reflux biopsies taken    RECOMMENDATIONS:     Discharge home  Resume regular diet  Resume home medications  Follow up biopsy results  Call with any abdominal pain, bleeding, fevers    COMPLICATIONS:  None; patient tolerated the procedure well            DISPOSITION: PACU           CONDITION: Stable        Colonoscopy Procedure Note    Procedure: Colonoscopy    Sedation: Monitored anesthesia care, check anesthesia records      ASA Class: 2    INDICATIONS:   Family history of colon cancer, change in bowel movements    POST-OP DIAGNOSIS: See the impression below    Procedure Details     Prior colonoscopy: No prior colonoscopy  Informed consent was obtained for the procedure, including sedation  Risks of perforation, hemorrhage, adverse drug reaction and aspiration were discussed  The patient was placed in the left lateral decubitus position  Based on the pre-procedure assessment, including review of the patient's medical history, medications, allergies, and review of systems, she had been deemed to be an appropriate candidate for conscious sedation; she was therefore sedated with the medications listed below  The patient was monitored continuously with telemetry, pulse oximetry, blood pressure monitoring, and direct observations  A rectal examination was performed  The colonoscope was inserted into the rectum and advanced under direct vision to the cecum, which was identified by the ileocecal valve and appendiceal orifice  The quality of the colonic preparation was good  A careful inspection was made as the colonoscope was withdrawn, including a retroflexed view of the rectum; findings and interventions are described below  Findings:    Normal colonoscopy with good prep good visualization  Endo cuff was used  Mild internal hemorrhoids on retroflexion           Complications: None; patient tolerated the procedure well  Impression:      Normal colonoscopy with good prep good visualization  Mild internal hemorrhoids on retroflexion    Recommendations:  Repeat colonoscopy in 5 years or sooner if clinically indicated  Repeat colonoscopy is being recommended at an interval of less than 10 years, this is because of a family history of polyps or colon cancer      Discharge home  Resume regular diet  Resume home medications  Repeat colonoscopy in 5 years due to family history of colon cancer  Call with any abdominal pain, bleeding, fevers  The recommend high-fiber diet

## 2018-09-12 NOTE — DISCHARGE INSTRUCTIONS
Discharge home  Resume regular diet  Resume home medications  Follow up biopsy results  Repeat colonoscopy in 5 years  High-fiber diet  Call with any abdominal pain, bleeding, fevers

## 2018-09-12 NOTE — OP NOTE
Colonoscopy Procedure Note    Procedure: Colonoscopy    Sedation: Monitored anesthesia care, check anesthesia records      ASA Class: 2    INDICATIONS:   Family history of colon cancer, change in bowel movements    POST-OP DIAGNOSIS: See the impression below    Procedure Details     Prior colonoscopy: No prior colonoscopy  Informed consent was obtained for the procedure, including sedation  Risks of perforation, hemorrhage, adverse drug reaction and aspiration were discussed  The patient was placed in the left lateral decubitus position  Based on the pre-procedure assessment, including review of the patient's medical history, medications, allergies, and review of systems, she had been deemed to be an appropriate candidate for conscious sedation; she was therefore sedated with the medications listed below  The patient was monitored continuously with telemetry, pulse oximetry, blood pressure monitoring, and direct observations  A rectal examination was performed  The colonoscope was inserted into the rectum and advanced under direct vision to the cecum, which was identified by the ileocecal valve and appendiceal orifice  The quality of the colonic preparation was good  A careful inspection was made as the colonoscope was withdrawn, including a retroflexed view of the rectum; findings and interventions are described below  Findings:    Normal colonoscopy with good prep good visualization  Endo cuff was used  Mild internal hemorrhoids on retroflexion           Complications: None; patient tolerated the procedure well  Impression:      Normal colonoscopy with good prep good visualization  Mild internal hemorrhoids on retroflexion    Recommendations:  Repeat colonoscopy in 5 years or sooner if clinically indicated  Repeat colonoscopy is being recommended at an interval of less than 10 years, this is because of a family history of polyps or colon cancer      Discharge home  Resume regular diet  Resume home medications  Repeat colonoscopy in 5 years due to family history of colon cancer  Call with any abdominal pain, bleeding, fevers  The recommend high-fiber diet

## 2018-09-12 NOTE — OP NOTE
ESOPHAGOGASTRODUODENOSCOPY    PROCEDURE: EGD    SEDATION: Monitored anesthesia care, check anesthesia records    ASA Class: 2    INDICATIONS:   Epigastric abdominal pain, early satiety    CONSENT:  Informed consent was obtained for the procedure, including sedation after explaining the risks and benefits of the procedure  Risks including but not limited to bleeding, perforation, infection, and missed lesion  PREPARATION:   Telemetry, pulse oximetry, blood pressure were monitored throughout the procedure  Patient was identified by myself both verbally and by visual inspection of ID band  DESCRIPTION:   Patient was placed in the left lateral decubitus position and was sedated with the above medication  The gastroscope was introduced in to the oropharynx and the esophagus was intubated under direct visualization  Scope was passed down the esophagus up to 2nd part of the duodenum  A careful inspection was made as the gastroscope was withdrawn, including a retroflexed view of the stomach; findings and interventions are described below  FINDINGS:    #1  Esophagus- small nodule GE junction, biopsies taken, no significant esophagitis noted    #2  Stomach- mild gastritis biopsies taken  Normal retroflexion    #3  Duodenum- normal duodenum, random biopsies taken         IMPRESSIONS:      Normal duodenum biopsied  Gastritis biopsied  Small esophageal nodule, likely secondary to esophagitis or reflux biopsies taken    RECOMMENDATIONS:     Discharge home  Resume regular diet  Resume home medications  Follow up biopsy results  Call with any abdominal pain, bleeding, fevers    COMPLICATIONS:  None; patient tolerated the procedure well            DISPOSITION: PACU           CONDITION: Stable

## 2018-09-12 NOTE — H&P
History and Physical -  [facility]  Flaquito Jeffers 47 y o  female MRN: [id number]    HPI: Flaquito Jeffers is a 47y o  year old female who presents with abdominal pain, early satiety, change in bowel movement and family hx of colon cancer  Review of Systems    Historical Information   Past Medical History:   Diagnosis Date    Allergic     Lactose intolerance      Past Surgical History:   Procedure Laterality Date    BLADDER SURGERY      DILATION AND CURETTAGE OF UTERUS      HERNIA REPAIR      inguinal, left    TONSILLECTOMY       Social History   History   Alcohol Use No     History   Drug Use No     History   Smoking Status    Former Smoker    Packs/day: 2 00    Years: 8 00   Smokeless Tobacco    Never Used     Comment: quit age 22     Family History   Problem Relation Age of Onset    Hyperlipidemia Mother     Hypertension Mother     Liver disease Mother     Pancreatic cancer Mother     Mental illness Mother     Colon cancer Father 79    Hypertension Brother     Alcohol abuse Brother     Mental illness Brother     Heart disease Maternal Grandmother     Dementia Maternal Grandmother     Heart disease Maternal Grandfather     Skin cancer Paternal Grandmother     Heart disease Maternal Uncle        Meds/Allergies     Prescriptions Prior to Admission   Medication    Calcium-Magnesium-Vitamin D (CALCIUM MAGNESIUM PO)    multivitamin (THERAGRAN) TABS       Allergies   Allergen Reactions    Sulfa Antibiotics Hives    Neomycin Hives    Penicillins      Reaction Date: 12Nov2009;     Azithromycin Rash    Levaquin [Levofloxacin] Tachycardia       Objective     Blood pressure 129/73, pulse 85, temperature 97 8 °F (36 6 °C), temperature source Temporal, resp  rate 18, height 5' 2" (1 575 m), weight 45 4 kg (100 lb), SpO2 97 %, not currently breastfeeding        PHYSICAL EXAM    Gen: NAD  CV: RRR  CHEST: Clear  ABD: soft, NT/ND  EXT: no edema  Neuro: AAO      ASSESSMENT/PLAN:  This is a 47y o  year old female here for abdominal pain, early satiety, change in bowel movement and family hx of colon cancer       PLAN:   Procedure: egd/colonoscopy

## 2018-09-17 ENCOUNTER — ANNUAL EXAM (OUTPATIENT)
Dept: OBGYN CLINIC | Facility: CLINIC | Age: 53
End: 2018-09-17
Payer: COMMERCIAL

## 2018-09-17 VITALS
BODY MASS INDEX: 19.14 KG/M2 | HEIGHT: 62 IN | WEIGHT: 104 LBS | SYSTOLIC BLOOD PRESSURE: 132 MMHG | DIASTOLIC BLOOD PRESSURE: 80 MMHG

## 2018-09-17 DIAGNOSIS — N85.2 ENLARGED UTERUS: ICD-10-CM

## 2018-09-17 DIAGNOSIS — Z12.39 BREAST CANCER SCREENING: ICD-10-CM

## 2018-09-17 DIAGNOSIS — Z01.419 ENCOUNTER FOR ANNUAL ROUTINE GYNECOLOGICAL EXAMINATION: Primary | ICD-10-CM

## 2018-09-17 PROCEDURE — 99396 PREV VISIT EST AGE 40-64: CPT | Performed by: OBSTETRICS & GYNECOLOGY

## 2018-09-17 NOTE — PROGRESS NOTES
Assessment/Plan:    Pap smear done as well as annual   Encouraged self-breast examination as well as calcium supplementation  Recommend pelvic ultrasound follow-up enlarged uterus  Reviewed prior ultrasound studies from 2015 revealing no discrete fibroid  She will continue annual mammograms  She will return to office in 3-4 weeks for follow-up discussion  We also discussed treatment options for menorrhagia/dysmenorrhea including medical versus surgical   She may be interested in [place] which will be further discussed on follow-up  No problem-specific Assessment & Plan notes found for this encounter  Diagnoses and all orders for this visit:    Encounter for annual routine gynecological examination  -     Cancel: Liquid-based pap, screening  -     Pap Lb, HPV-hr    Breast cancer screening  -     Mammo screening bilateral w 3d & cad; Future    Enlarged uterus  -     US pelvis complete w transvaginal; Future          Subjective:      Patient ID: Bernarda Frausto is a 47 y o  female  HPI     This is a 60-year-old female P1 ( x1) presents for her annual gyn exam   In the course of the year initially her menstrual cycles were irregular where she was skipping, now more in the last 4 months her cycles are regular every 4-5 weeks lasting 4-5 days with no breakthrough bleeding  She does complain of severe cramping when her period is heavy passing clots  She has recently had a colonoscopy with 3 biopsies that are pending as well as an endoscopy  She has a long history of constipation  In the last 8 months she has complained of bladder symptoms including difficulty voiding  She does void 7-8 times per day  Prior to her colonoscopy she did have a CT scan which had suggestive of fibroid uterus  In the past we have discussed performing an MRI to delineate the uterus  She is reluctant to have MRI done due to claustrophobia      The following portions of the patient's history were reviewed and updated as appropriate: allergies, current medications, past family history, past medical history, past social history, past surgical history and problem list     Review of Systems   Constitutional: Negative for fatigue, fever and unexpected weight change  Respiratory: Negative for cough, chest tightness, shortness of breath and wheezing  Cardiovascular: Negative  Negative for chest pain and palpitations  Gastrointestinal: Positive for abdominal distention and constipation  Negative for abdominal pain, blood in stool, diarrhea, nausea and vomiting  Genitourinary: Positive for difficulty urinating  Negative for dyspareunia, dysuria, flank pain, frequency, genital sores, hematuria, pelvic pain, urgency, vaginal bleeding, vaginal discharge and vaginal pain  Skin: Negative for rash  Objective:      /80   Ht 5' 2" (1 575 m)   Wt 47 2 kg (104 lb)   LMP 09/07/2018   Breastfeeding? No   BMI 19 02 kg/m²          Physical Exam   Constitutional: She appears well-developed and well-nourished  Cardiovascular: Normal rate and regular rhythm  Pulmonary/Chest: Effort normal and breath sounds normal  Right breast exhibits no inverted nipple, no mass, no nipple discharge, no skin change and no tenderness  Left breast exhibits no inverted nipple, no mass, no nipple discharge, no skin change and no tenderness  Abdominal: Soft  Bowel sounds are normal  She exhibits no distension  There is no tenderness  There is no rebound and no guarding  Genitourinary: Rectum normal, vagina normal and uterus normal  There is no lesion on the right labia  There is no lesion on the left labia  Cervix exhibits no discharge and no friability  Right adnexum displays no mass, no tenderness and no fullness  Left adnexum displays no mass, no tenderness and no fullness  No vaginal discharge found  Genitourinary Comments: Uterus is globular, retroverted, rectovaginal exam is confirmatory

## 2018-09-18 ENCOUNTER — HOSPITAL ENCOUNTER (OUTPATIENT)
Dept: ULTRASOUND IMAGING | Facility: HOSPITAL | Age: 53
Discharge: HOME | End: 2018-09-18
Payer: COMMERCIAL

## 2018-09-18 ENCOUNTER — TELEPHONE (OUTPATIENT)
Dept: GASTROENTEROLOGY | Facility: CLINIC | Age: 53
End: 2018-09-18

## 2018-09-18 DIAGNOSIS — N85.2 ENLARGED UTERUS: ICD-10-CM

## 2018-09-18 PROCEDURE — 76830 TRANSVAGINAL US NON-OB: CPT

## 2018-09-18 PROCEDURE — 76856 US EXAM PELVIC COMPLETE: CPT

## 2018-09-18 NOTE — TELEPHONE ENCOUNTER
----- Message from Vandana Epstein MD sent at 9/18/2018  9:43 AM EDT -----  Please inform patient that biopsies from small intestine are negative for celiac sprue, biopsies from stomach were negative for H pylori  Biopsies from her esophagus was normal also as well  There is no evidence of Puri's esophagus, no cancer  This is great news  Please make sure that she has repeat colonoscopy in 5 years  Please have her follow up in the office as needed, call with any questions or concerns

## 2018-09-21 LAB
CYTOLOGIST CVX/VAG CYTO: NORMAL
DX ICD CODE: NORMAL
HPV I/H RISK 1 DNA CVX QL PROBE+SIG AMP: NEGATIVE
Lab: NORMAL
OTHER STN SPEC: NORMAL
PATH REPORT.FINAL DX SPEC: NORMAL
SL AMB NOTE:: NORMAL
SL AMB SPECIMEN ADEQUACY: NORMAL

## 2018-09-26 ENCOUNTER — TELEPHONE (OUTPATIENT)
Dept: OBGYN CLINIC | Facility: CLINIC | Age: 53
End: 2018-09-26

## 2018-10-01 ENCOUNTER — OFFICE VISIT (OUTPATIENT)
Dept: INTERNAL MEDICINE CLINIC | Facility: CLINIC | Age: 53
End: 2018-10-01
Payer: COMMERCIAL

## 2018-10-01 VITALS
SYSTOLIC BLOOD PRESSURE: 116 MMHG | TEMPERATURE: 98.2 F | HEIGHT: 62 IN | WEIGHT: 103 LBS | OXYGEN SATURATION: 99 % | DIASTOLIC BLOOD PRESSURE: 80 MMHG | RESPIRATION RATE: 18 BRPM | BODY MASS INDEX: 18.95 KG/M2 | HEART RATE: 78 BPM

## 2018-10-01 DIAGNOSIS — R73.01 ABNORMAL FASTING GLUCOSE: ICD-10-CM

## 2018-10-01 DIAGNOSIS — E78.49 OTHER HYPERLIPIDEMIA: ICD-10-CM

## 2018-10-01 DIAGNOSIS — E55.9 MILD VITAMIN D DEFICIENCY: ICD-10-CM

## 2018-10-01 DIAGNOSIS — M53.3 COCCYGEAL PAIN: ICD-10-CM

## 2018-10-01 DIAGNOSIS — K21.9 GASTROESOPHAGEAL REFLUX DISEASE WITHOUT ESOPHAGITIS: Primary | ICD-10-CM

## 2018-10-01 DIAGNOSIS — Z71.9 HEALTH COUNSELING: ICD-10-CM

## 2018-10-01 DIAGNOSIS — M94.0 COSTOCHONDRITIS: ICD-10-CM

## 2018-10-01 PROBLEM — R39.198 DIFFICULTY IN URINATION: Status: RESOLVED | Noted: 2018-05-31 | Resolved: 2018-10-01

## 2018-10-01 PROCEDURE — 1036F TOBACCO NON-USER: CPT | Performed by: INTERNAL MEDICINE

## 2018-10-01 PROCEDURE — 99214 OFFICE O/P EST MOD 30 MIN: CPT | Performed by: INTERNAL MEDICINE

## 2018-10-01 NOTE — PROGRESS NOTES
Assessment/Plan:    Endometriosis  Follow up with gynecology later this month  Coccygeal pain  Stable  Costochondritis  Stable  GERD (gastroesophageal reflux disease)  Discussed low acid diet, may continue using honey qHS  Not on any medication  Recently had EGD  Constipation  Improved  Other hyperlipidemia  LDL slightly elevated, not on medication  Diagnoses and all orders for this visit:    Gastroesophageal reflux disease without esophagitis    Mild vitamin D deficiency    Other hyperlipidemia    Coccygeal pain    Abnormal fasting glucose    Costochondritis    Health counseling  Comments:  Repeat colonoscopy 2023  Declined flu vaccine, eye exam updated  Follow up in 1 year or as needed  Subjective:      Patient ID: Ray Moran is a 47 y o  female  Mrs Kushal Alberts is feeling well, no new complaints  She reports reflux symptoms are a bit better, starting drinking honey with lemon every night  She had a CT scan, EGD and colonoscopy recently  She contiues to experience left upper chest pain, feels sore with certain activities  She reports tail bone pain is about the same  She will see gynecology regarding her fibroids  She reports allergy symptoms are mild, not using any over the counter medication  The following portions of the patient's history were reviewed and updated as appropriate: allergies, current medications, past medical history, past social history and problem list     Review of Systems   Constitutional: Negative for appetite change and fatigue  HENT: Negative for congestion and postnasal drip  Eyes: Negative for visual disturbance  Respiratory: Negative for cough and shortness of breath  Cardiovascular: Negative for chest pain and leg swelling  Gastrointestinal: Negative for abdominal distention, abdominal pain, constipation, diarrhea and nausea  Genitourinary: Negative for dysuria, frequency and urgency     Musculoskeletal: Positive for arthralgias  Negative for gait problem and myalgias  Skin: Negative for rash and wound  Neurological: Negative for dizziness, numbness and headaches  Psychiatric/Behavioral: Negative for confusion  The patient is not nervous/anxious  Objective:      /80   Pulse 78   Temp 98 2 °F (36 8 °C)   Resp 18   Ht 5' 2" (1 575 m)   Wt 46 7 kg (103 lb)   LMP 09/07/2018   SpO2 99%   BMI 18 84 kg/m²          Physical Exam   Constitutional: She is oriented to person, place, and time  She appears well-developed and well-nourished  HENT:   Head: Normocephalic and atraumatic  Nose: Nose normal    Eyes: Pupils are equal, round, and reactive to light  Conjunctivae are normal    Neck: Neck supple  Cardiovascular: Normal rate, regular rhythm and normal heart sounds  No edema  Pulmonary/Chest: Effort normal and breath sounds normal  She has no wheezes  She has no rales  Abdominal: Soft  Bowel sounds are normal    Neurological: She is alert and oriented to person, place, and time  Skin: Skin is warm  No rash noted  Psychiatric: She has a normal mood and affect  Her behavior is normal    Nursing note and vitals reviewed  Lab &/or imaging results reviewed with patient

## 2018-10-22 ENCOUNTER — OFFICE VISIT (OUTPATIENT)
Dept: OBGYN CLINIC | Facility: CLINIC | Age: 53
End: 2018-10-22
Payer: COMMERCIAL

## 2018-10-22 VITALS
DIASTOLIC BLOOD PRESSURE: 82 MMHG | SYSTOLIC BLOOD PRESSURE: 120 MMHG | HEIGHT: 62 IN | BODY MASS INDEX: 19.47 KG/M2 | WEIGHT: 105.8 LBS

## 2018-10-22 DIAGNOSIS — N92.6 IRREGULAR MENSES: Primary | ICD-10-CM

## 2018-10-22 PROCEDURE — 99213 OFFICE O/P EST LOW 20 MIN: CPT | Performed by: OBSTETRICS & GYNECOLOGY

## 2018-10-22 NOTE — PROGRESS NOTES
Assessment/Plan:    Discussed treatment options including low-dose OCPs, lysteda, endometrial ablation and hysterectomy  Risks and benefits reviewed  Patient would like to remain conservative and monitor her symptoms over the next couple of months  She will notify me otherwise return to office in 1 year  No problem-specific Assessment & Plan notes found for this encounter  Diagnoses and all orders for this visit:    Irregular menses          Subjective:      Patient ID: Terrell Morris is a 47 y o  female  HPI     This is a 56-year-old female P1 ( x1) presents for follow-up menorrhagia/dysmenorrhea  She is now 2-3 weeks late for her menstrual cycle  She does feel symptoms including breast tenderness and bloating  At times her cycles can be heavy passing clots  I reviewed her pelvic ultrasound revealing globular uterus, suspected adenomyosis, no evidence of fibroid uterus  Implications reviewed  She did have prior CT scans report from 2015 in 2018 that was suspicious for fibroid uterus  Again I did mention we can proceed with MRI  At this point she is reluctant to have any further testing done  The following portions of the patient's history were reviewed and updated as appropriate: allergies, current medications, past family history, past medical history, past social history, past surgical history and problem list     Review of Systems   Constitutional: Negative for fatigue, fever and unexpected weight change  Respiratory: Negative for cough, chest tightness, shortness of breath and wheezing  Cardiovascular: Negative  Negative for chest pain and palpitations  Gastrointestinal: Negative  Negative for abdominal distention, abdominal pain, blood in stool, constipation, diarrhea, nausea and vomiting  Genitourinary: Positive for menstrual problem   Negative for difficulty urinating, dyspareunia, dysuria, flank pain, frequency, genital sores, hematuria, pelvic pain, urgency, vaginal bleeding, vaginal discharge and vaginal pain  Skin: Negative for rash  Objective:      /82   Ht 5' 2" (1 575 m)   Wt 48 kg (105 lb 12 8 oz)   LMP 09/09/2018   Breastfeeding?  No   BMI 19 35 kg/m²          Physical Exam

## 2019-03-06 DIAGNOSIS — Z12.31 ENCOUNTER FOR SCREENING MAMMOGRAM FOR MALIGNANT NEOPLASM OF BREAST: ICD-10-CM

## 2019-09-13 ENCOUNTER — OFFICE VISIT (OUTPATIENT)
Dept: INTERNAL MEDICINE CLINIC | Facility: CLINIC | Age: 54
End: 2019-09-13
Payer: COMMERCIAL

## 2019-09-13 VITALS
HEART RATE: 87 BPM | HEIGHT: 62 IN | SYSTOLIC BLOOD PRESSURE: 118 MMHG | BODY MASS INDEX: 18.84 KG/M2 | OXYGEN SATURATION: 97 % | DIASTOLIC BLOOD PRESSURE: 70 MMHG | WEIGHT: 102.4 LBS | TEMPERATURE: 97.9 F | RESPIRATION RATE: 18 BRPM

## 2019-09-13 DIAGNOSIS — R07.9 CHEST PAIN, UNSPECIFIED TYPE: Primary | ICD-10-CM

## 2019-09-13 DIAGNOSIS — M54.2 NECK PAIN: ICD-10-CM

## 2019-09-13 DIAGNOSIS — R47.81 SLURRED SPEECH: ICD-10-CM

## 2019-09-13 PROCEDURE — 3008F BODY MASS INDEX DOCD: CPT | Performed by: INTERNAL MEDICINE

## 2019-09-13 PROCEDURE — 99213 OFFICE O/P EST LOW 20 MIN: CPT | Performed by: INTERNAL MEDICINE

## 2019-09-13 PROCEDURE — 93000 ELECTROCARDIOGRAM COMPLETE: CPT | Performed by: INTERNAL MEDICINE

## 2019-09-13 NOTE — PROGRESS NOTES
Assessment/Plan:    Costochondritis  No tenderness at today's exam        Diagnoses and all orders for this visit:    Chest pain, unspecified type  Comments:  EKG normal, (+) family history  Agrees to do stress test   Orders:  -     POCT ECG  -     CBC and differential  -     Comprehensive metabolic panel  -     TSH, 3rd generation with Free T4 reflex  -     Echo complete with contrast if indicated; Future  -     Echo stress test w contrast if indicated; Future    Slurred speech  Comments:  Carotid ultrasound showed mild disease  Can start daily ASA  LDL slightly elevated last year, not on medication  Orders:  -     Echo complete with contrast if indicated; Future  -     Echo stress test w contrast if indicated; Future    Neck pain  Comments:  Reports occasional neck pain, non radiating  Orders:  -     XR spine cervical complete 4 or 5 vw non injury; Future    Other orders  -     BIOTIN PO; Take by mouth      Discussed stroke symptoms, and when to go to the ER  Follow up as scheduled or as needed  Subjective:      Patient ID: Asa Pringle is a 49 y o  female  Mrs Lorena Bobo complains of not feeling well  She cannot say when symtpoms first started, experiencing different symptoms at different times  First, she feels her glands are arge, has mild right ear discomfort  She reports a few weeks ago she had severe bilateral eye pain, describes it as pressure behind both eyes  No headache  Symptoms resolved after a few minutes, occurred at night  She reports symptoms recurred a few days ago but not as severe, lasted for a few minutes also  She does have an eye appointment scheduled next week  She has a history of deviated septum in frequent postnasal drip, this has not changed  She complains of a sore throat for several weeks which gets better and then returns  She does not use any nasal sprays or take any antihistamine      She reports occasional headaches, occurred on the left side of her head, described as pressure in severe, lasted for a few minutes only  She also reports of frequent bloating of her abdomen, reports that she needed a bigger size pants  Lastly, she reports experiencing left-sided chest pain, radiating to left arm  Denies any shortness of breath or palpitations or other symptoms  This lasted for a few minutes also  This occurred a few times, usually at rest   No symptoms with activity  She also noticed occusional slurred speech, has a difficult time saying words  She recalls one time her right arm felt heavy, last for about 20 minutes  She denies any recent anxiety, reports stress is about the same at work  The following portions of the patient's history were reviewed and updated as appropriate: allergies, current medications, past medical history, past social history and problem list     Review of Systems   Constitutional: Positive for fatigue  Negative for activity change and appetite change  HENT: Positive for congestion and postnasal drip  Negative for rhinorrhea, sinus pressure and sinus pain  Eyes: Positive for pain  Negative for visual disturbance  Respiratory: Negative for cough and shortness of breath  Cardiovascular: Positive for chest pain  Negative for palpitations and leg swelling  Gastrointestinal: Positive for abdominal distention, abdominal pain and constipation  Negative for diarrhea and nausea  Genitourinary: Negative for dysuria  Neurological: Positive for dizziness  Negative for numbness and headaches  Psychiatric/Behavioral: Negative for confusion and sleep disturbance  The patient is not nervous/anxious  Objective:      /70   Pulse 87   Temp 97 9 °F (36 6 °C) (Oral)   Resp 18   Ht 5' 2" (1 575 m)   Wt 46 4 kg (102 lb 6 4 oz)   SpO2 97%   BMI 18 73 kg/m²          Physical Exam   Constitutional: She is oriented to person, place, and time  She appears well-developed and well-nourished     HENT:   Head: Normocephalic and atraumatic  Right Ear: Tympanic membrane, external ear and ear canal normal    Left Ear: Tympanic membrane, external ear and ear canal normal    Nose: Mucosal edema and rhinorrhea present  Right sinus exhibits no maxillary sinus tenderness and no frontal sinus tenderness  Left sinus exhibits no maxillary sinus tenderness and no frontal sinus tenderness  Eyes: Pupils are equal, round, and reactive to light  Conjunctivae are normal    Neck: Neck supple  Cardiovascular: Normal rate, regular rhythm and normal heart sounds  No chest wall tenderness   Pulmonary/Chest: Effort normal and breath sounds normal  She has no wheezes  She has no rales  Abdominal: Soft  Bowel sounds are normal    Musculoskeletal: She exhibits no edema  Lymphadenopathy:     She has no cervical adenopathy  Neurological: She is alert and oriented to person, place, and time  Skin: Skin is warm  Psychiatric: She has a normal mood and affect  Nursing note and vitals reviewed  Lab results reviewed with patient  EKG: normal EKG, normal sinus rhythm, there are no previous tracings available for comparison

## 2019-09-29 LAB
ALBUMIN SERPL-MCNC: 4.2 G/DL (ref 3.6–5.1)
ALBUMIN/GLOB SERPL: 1.8 (CALC) (ref 1–2.5)
ALP SERPL-CCNC: 61 U/L (ref 33–130)
ALT SERPL-CCNC: 10 U/L (ref 6–29)
AST SERPL-CCNC: 16 U/L (ref 10–35)
BASOPHILS # BLD AUTO: 29 CELLS/UL (ref 0–200)
BASOPHILS NFR BLD AUTO: 0.5 %
BILIRUB SERPL-MCNC: 0.6 MG/DL (ref 0.2–1.2)
BUN SERPL-MCNC: 14 MG/DL (ref 7–25)
BUN/CREAT SERPL: NORMAL (CALC) (ref 6–22)
CALCIUM SERPL-MCNC: 8.9 MG/DL (ref 8.6–10.4)
CHLORIDE SERPL-SCNC: 101 MMOL/L (ref 98–110)
CO2 SERPL-SCNC: 28 MMOL/L (ref 20–32)
CREAT SERPL-MCNC: 0.94 MG/DL (ref 0.5–1.05)
EOSINOPHIL # BLD AUTO: 122 CELLS/UL (ref 15–500)
EOSINOPHIL NFR BLD AUTO: 2.1 %
ERYTHROCYTE [DISTWIDTH] IN BLOOD BY AUTOMATED COUNT: 12.5 % (ref 11–15)
GLOBULIN SER CALC-MCNC: 2.4 G/DL (CALC) (ref 1.9–3.7)
GLUCOSE SERPL-MCNC: 86 MG/DL (ref 65–99)
HCT VFR BLD AUTO: 40.8 % (ref 35–45)
HGB BLD-MCNC: 13.7 G/DL (ref 11.7–15.5)
LYMPHOCYTES # BLD AUTO: 2355 CELLS/UL (ref 850–3900)
LYMPHOCYTES NFR BLD AUTO: 40.6 %
MCH RBC QN AUTO: 31 PG (ref 27–33)
MCHC RBC AUTO-ENTMCNC: 33.6 G/DL (ref 32–36)
MCV RBC AUTO: 92.3 FL (ref 80–100)
MONOCYTES # BLD AUTO: 597 CELLS/UL (ref 200–950)
MONOCYTES NFR BLD AUTO: 10.3 %
NEUTROPHILS # BLD AUTO: 2697 CELLS/UL (ref 1500–7800)
NEUTROPHILS NFR BLD AUTO: 46.5 %
PLATELET # BLD AUTO: 220 THOUSAND/UL (ref 140–400)
PMV BLD REES-ECKER: 11.8 FL (ref 7.5–12.5)
POTASSIUM SERPL-SCNC: 3.7 MMOL/L (ref 3.5–5.3)
PROT SERPL-MCNC: 6.6 G/DL (ref 6.1–8.1)
RBC # BLD AUTO: 4.42 MILLION/UL (ref 3.8–5.1)
SL AMB EGFR AFRICAN AMERICAN: 80 ML/MIN/1.73M2
SL AMB EGFR NON AFRICAN AMERICAN: 69 ML/MIN/1.73M2
SODIUM SERPL-SCNC: 138 MMOL/L (ref 135–146)
TSH SERPL-ACNC: 1.45 MIU/L
WBC # BLD AUTO: 5.8 THOUSAND/UL (ref 3.8–10.8)

## 2019-11-04 ENCOUNTER — ANNUAL EXAM (OUTPATIENT)
Dept: OBGYN CLINIC | Facility: CLINIC | Age: 54
End: 2019-11-04
Payer: COMMERCIAL

## 2019-11-04 VITALS
DIASTOLIC BLOOD PRESSURE: 68 MMHG | BODY MASS INDEX: 19.29 KG/M2 | SYSTOLIC BLOOD PRESSURE: 120 MMHG | HEIGHT: 62 IN | WEIGHT: 104.8 LBS

## 2019-11-04 DIAGNOSIS — Z12.39 BREAST CANCER SCREENING: ICD-10-CM

## 2019-11-04 DIAGNOSIS — Z01.419 ENCOUNTER FOR ANNUAL ROUTINE GYNECOLOGICAL EXAMINATION: Primary | ICD-10-CM

## 2019-11-04 PROCEDURE — 99396 PREV VISIT EST AGE 40-64: CPT | Performed by: OBSTETRICS & GYNECOLOGY

## 2019-11-04 RX ORDER — CHLORAL HYDRATE 500 MG
CAPSULE ORAL
COMMUNITY

## 2019-11-04 NOTE — PROGRESS NOTES
Assessment/Plan:  Pap smear deferred due to low risk status  Encouraged self-breast examination as well as calcium supplementation  Continue annual 3D mammogram   Reviewed adrianna menopausal symptoms  She will keep a menstrual diary, call if less than 21 day cycle  She will continue to follow-up with her family physician as scheduled  Return to office in 1 year or p r n  No problem-specific Assessment & Plan notes found for this encounter  Diagnoses and all orders for this visit:    Encounter for annual routine gynecological examination    Breast cancer screening  -     Mammo screening bilateral w 3d & cad; Future    Other orders  -     Omega-3 1000 MG CAPS; Take by mouth          Subjective:      Patient ID: Karli Spencer is a 49 y o  female  HPI     This is a 60-year-old female P1 ( x1) presents for annual gyn exam   She states her menstrual cycles have normalized over the last 6 months now more regular every 4 weeks lasting 4-7 days with no breakthrough bleeding  She states her menstrual cramping has also improved  She denies any changes in bowel or bladder function  Patient has been in a monogamous relationship for over 26 years  Her Pap smears have been normal   She had a colonoscopy last year which had revealed polyps with follow-up in 5 years  She is scheduled for a stress test next month  She does follow up with her family physician on a regular basis  The following portions of the patient's history were reviewed and updated as appropriate: allergies, current medications, past family history, past medical history, past social history, past surgical history and problem list     Review of Systems   Constitutional: Negative for fatigue, fever and unexpected weight change  Respiratory: Negative for cough, chest tightness, shortness of breath and wheezing  Cardiovascular: Negative  Negative for chest pain and palpitations  Gastrointestinal: Negative    Negative for abdominal distention, abdominal pain, blood in stool, constipation, diarrhea, nausea and vomiting  Genitourinary: Negative  Negative for difficulty urinating, dyspareunia, dysuria, flank pain, frequency, genital sores, hematuria, pelvic pain, urgency, vaginal bleeding, vaginal discharge and vaginal pain  Skin: Negative for rash  Objective:      /68   Ht 5' 2" (1 575 m)   Wt 47 5 kg (104 lb 12 8 oz)   LMP 10/04/2019 (Exact Date)   Breastfeeding? No   BMI 19 17 kg/m²          Physical Exam   Constitutional: She appears well-developed and well-nourished  Cardiovascular: Normal rate and regular rhythm  Pulmonary/Chest: Effort normal and breath sounds normal  Right breast exhibits no inverted nipple, no mass, no nipple discharge, no skin change and no tenderness  Left breast exhibits no inverted nipple, no mass, no nipple discharge, no skin change and no tenderness  Abdominal: Soft  Bowel sounds are normal  She exhibits no distension  There is no tenderness  There is no rebound and no guarding  Genitourinary: Vagina normal and uterus normal  There is no lesion on the right labia  There is no lesion on the left labia  Cervix exhibits no discharge and no friability  Right adnexum displays no mass, no tenderness and no fullness  Left adnexum displays no mass, no tenderness and no fullness  No vaginal discharge found  Genitourinary Comments: Uterus is globular top normal size  There is no evidence of pelvic floor prolapse

## 2019-12-08 ENCOUNTER — TELEPHONE (OUTPATIENT)
Dept: OTHER | Facility: OTHER | Age: 54
End: 2019-12-08

## 2019-12-09 ENCOUNTER — OFFICE VISIT (OUTPATIENT)
Dept: INTERNAL MEDICINE CLINIC | Facility: CLINIC | Age: 54
End: 2019-12-09
Payer: COMMERCIAL

## 2019-12-09 VITALS
OXYGEN SATURATION: 98 % | BODY MASS INDEX: 19.1 KG/M2 | HEART RATE: 87 BPM | HEIGHT: 62 IN | TEMPERATURE: 97.7 F | WEIGHT: 103.8 LBS | DIASTOLIC BLOOD PRESSURE: 74 MMHG | RESPIRATION RATE: 18 BRPM | SYSTOLIC BLOOD PRESSURE: 120 MMHG

## 2019-12-09 DIAGNOSIS — E78.49 OTHER HYPERLIPIDEMIA: ICD-10-CM

## 2019-12-09 DIAGNOSIS — M54.2 NECK PAIN: ICD-10-CM

## 2019-12-09 DIAGNOSIS — Z00.00 HEALTH MAINTENANCE EXAMINATION: Primary | ICD-10-CM

## 2019-12-09 DIAGNOSIS — R07.9 CHEST PAIN, UNSPECIFIED TYPE: ICD-10-CM

## 2019-12-09 DIAGNOSIS — R47.81 SLURRED SPEECH: ICD-10-CM

## 2019-12-09 PROCEDURE — 99396 PREV VISIT EST AGE 40-64: CPT | Performed by: INTERNAL MEDICINE

## 2019-12-09 NOTE — PROGRESS NOTES
Assessment/Plan:    Other hyperlipidemia  Repeat lipids, no on medication  GERD (gastroesophageal reflux disease)  Intermittent symptoms  Endometriosis  No symptoms  Recently saw gynecology  Diagnoses and all orders for this visit:    Health maintenance examination  Comments:  Declines flu vaccine  PAPs, mammogram updated  Chest pain, unspecified type  Comments:  Intermittent symptoms  Stress test has been rescheduled, refer to cardiology  Orders:  -     Ambulatory referral to Cardiology; Future    Slurred speech  Comments:  Mild carotid disease on ultrasound screen  May benefit with neurology referral, MRI  Neck pain  Comments:  Did not do x-ray, intermittent symptoms  Other hyperlipidemia  -     Ambulatory referral to Cardiology; Future  -     Lipid panel      Follow up in 1 year or as needed  Subjective:      Patient ID: Karli Spencer is a 49 y o  female  Mrs Blossom Vasquez has been feeling alright  She continues to experience intermittent chest pains  Pain mostly in the left upper area  She reports 2 episodes where in she experience discomfort in the left substernal area  This lasted for few hours, occurred in 2 separate occasions  He has rescheduled her stress test several times since insurance would not cover it  She continues to experience occasional mumbling, reports that words would come out differently from what she is thinking  She did have a carotid ultrasound which showed mild disease  She would experience occasional nausea when she experiences head pressure  Denies any real headaches or migraine headaches  She denies any epigastric pain, abdominal cramping or bloating  She moves her bowels regularly      The following portions of the patient's history were reviewed and updated as appropriate: allergies, current medications, past medical history, past social history and problem list     Review of Systems   Constitutional: Negative for activity change, appetite change, fatigue and fever  HENT: Negative for congestion  Eyes: Negative for visual disturbance  Respiratory: Positive for chest tightness  Negative for cough and shortness of breath  Cardiovascular: Positive for chest pain  Negative for palpitations and leg swelling  Gastrointestinal: Positive for nausea  Genitourinary: Negative for difficulty urinating and dysuria  Musculoskeletal: Negative for arthralgias  Skin: Negative for rash  Neurological: Positive for dizziness and headaches  Psychiatric/Behavioral: Negative for confusion and sleep disturbance  Objective:      /74   Pulse 87   Temp 97 7 °F (36 5 °C) (Oral)   Resp 18   Ht 5' 2" (1 575 m)   Wt 47 1 kg (103 lb 12 8 oz)   SpO2 98%   BMI 18 99 kg/m²          Physical Exam   Constitutional: She is oriented to person, place, and time  She appears well-developed and well-nourished  HENT:   Head: Normocephalic and atraumatic  Eyes: Pupils are equal, round, and reactive to light  Cardiovascular: Normal rate, regular rhythm and normal heart sounds  (+) tenderness left 2nd ICS   Pulmonary/Chest: Effort normal and breath sounds normal  She has no wheezes  Abdominal: Soft  Bowel sounds are normal    Musculoskeletal: She exhibits no edema  Neurological: She is alert and oriented to person, place, and time  Skin: Skin is warm  No rash noted  Psychiatric: She has a normal mood and affect  Her behavior is normal    Nursing note and vitals reviewed  Lab &/or imaging results reviewed with patient

## 2020-01-31 ENCOUNTER — OFFICE VISIT (OUTPATIENT)
Dept: INTERNAL MEDICINE CLINIC | Facility: CLINIC | Age: 55
End: 2020-01-31
Payer: COMMERCIAL

## 2020-01-31 VITALS
RESPIRATION RATE: 18 BRPM | TEMPERATURE: 97.7 F | HEIGHT: 62 IN | SYSTOLIC BLOOD PRESSURE: 120 MMHG | WEIGHT: 103.6 LBS | OXYGEN SATURATION: 97 % | BODY MASS INDEX: 19.06 KG/M2 | HEART RATE: 84 BPM | DIASTOLIC BLOOD PRESSURE: 70 MMHG

## 2020-01-31 DIAGNOSIS — H61.21 IMPACTED CERUMEN, RIGHT EAR: ICD-10-CM

## 2020-01-31 DIAGNOSIS — J30.1 SEASONAL ALLERGIC RHINITIS DUE TO POLLEN: Primary | ICD-10-CM

## 2020-01-31 PROCEDURE — 3008F BODY MASS INDEX DOCD: CPT | Performed by: INTERNAL MEDICINE

## 2020-01-31 PROCEDURE — 1036F TOBACCO NON-USER: CPT | Performed by: INTERNAL MEDICINE

## 2020-01-31 PROCEDURE — 99213 OFFICE O/P EST LOW 20 MIN: CPT | Performed by: INTERNAL MEDICINE

## 2020-01-31 PROCEDURE — 69210 REMOVE IMPACTED EAR WAX UNI: CPT | Performed by: INTERNAL MEDICINE

## 2020-01-31 NOTE — PROGRESS NOTES
Assessment/Plan:    Seasonal allergic rhinitis due to pollen  Continue with nasal sprays daily  Diagnoses and all orders for this visit:    Seasonal allergic rhinitis due to pollen    Impacted cerumen, right ear  Comments:  Successful removal  Avoid Q-tips  May use ear gtts prn  Other orders  -     Ear cerumen removal          Subjective:      Patient ID: Aleen Soulier is a 49 y o  female  Ms Nicole French complains of right ear pain  She reports that about a week ago, she had bilateral ear fullness and pressure  Her she reports her left ear has gotten better but reports right ear persisted  She feels area is full and full of wax  She tried cleaning with a children's Q-Tips unsuccessfully  She has a history of allergies, with frequent nasal congestion  She uses her nasal sprays regularly  She denies any sinus pain or tenderness, headaches, no recent fever or chills  The following portions of the patient's history were reviewed and updated as appropriate: allergies, current medications, past medical history, past social history and problem list     Review of Systems   Constitutional: Negative for fatigue and fever  HENT: Positive for congestion, ear pain and postnasal drip  Negative for sinus pressure and sinus pain  Respiratory: Negative for cough  Cardiovascular: Negative for chest pain  Neurological: Negative for dizziness and headaches  Objective:      /70   Pulse 84   Temp 97 7 °F (36 5 °C) (Oral)   Resp 18   Ht 5' 2" (1 575 m)   Wt 47 kg (103 lb 9 6 oz)   SpO2 97%   BMI 18 95 kg/m²          Physical Exam   Constitutional: She is oriented to person, place, and time  HENT:   Head: Normocephalic  Right Ear: External ear and ear canal normal    Left Ear: Tympanic membrane, external ear and ear canal normal    Nose: Mucosal edema and rhinorrhea present     Mouth/Throat: Mucous membranes are normal    Impacted cerumen, right   Eyes: Pupils are equal, round, and reactive to light  Neurological: She is alert and oriented to person, place, and time  Psychiatric: She has a normal mood and affect  Ear cerumen removal  Date/Time: 1/31/2020 2:46 PM  Performed by: Emily Ojeda MD  Authorized by: Emily Ojeda MD     Patient location:  Clinic  Consent:     Consent obtained:  Verbal    Consent given by:  Patient  Procedure details:     Local anesthetic:  None    Location:  R ear    Procedure type: irrigation with instrumentation      Approach:  External  Post-procedure details:     Complication:  None    Patient tolerance of procedure:   Tolerated well, no immediate complications

## 2020-03-09 ENCOUNTER — TELEPHONE (OUTPATIENT)
Dept: OBGYN CLINIC | Facility: CLINIC | Age: 55
End: 2020-03-09

## 2020-03-09 DIAGNOSIS — N60.02 CYST, BREAST, LEFT: Primary | ICD-10-CM

## 2020-03-09 NOTE — TELEPHONE ENCOUNTER
Pt had screening mgram & (L) breast U/S done 3/6/2020 (Rad & [facility]) - (L) breast cyst x 2 - recom 6 month diag (L) breast mgram & (L) breast U/S if needed  Pt informed  Rad orders mailed to pt - she will schedule appt time for 9/2020

## 2020-03-09 NOTE — TELEPHONE ENCOUNTER
----- Message from Gilbert Joshi DO sent at 3/8/2020  7:12 PM EDT -----  Inform pt needs 6 month f/u dx mammo and US

## 2020-03-23 ENCOUNTER — TELEPHONE (OUTPATIENT)
Dept: OBGYN CLINIC | Facility: CLINIC | Age: 55
End: 2020-03-23

## 2020-03-23 NOTE — TELEPHONE ENCOUNTER
----- Message from Gabe Alvarez DO sent at 3/23/2020 12:21 PM EDT -----  Inform pt leticia, f/u dx mammo and US in 6 months

## 2020-03-23 NOTE — TELEPHONE ENCOUNTER
Pt had bilat screening mgram (Rad & MRI of [facility]) showing (L) breast nodules x 2 - had (L) breast U/S - likely cysts - recom diag (L) breast mgram & (L) breast U/S if nec in 6 months  Pt states she was prev informed of results & has rad orders for 6 month f/u

## 2020-11-06 ENCOUNTER — OFFICE VISIT (OUTPATIENT)
Dept: OBGYN CLINIC | Facility: CLINIC | Age: 55
End: 2020-11-06
Payer: COMMERCIAL

## 2020-11-06 VITALS
WEIGHT: 101 LBS | DIASTOLIC BLOOD PRESSURE: 74 MMHG | TEMPERATURE: 98.6 F | HEIGHT: 62 IN | SYSTOLIC BLOOD PRESSURE: 130 MMHG | BODY MASS INDEX: 18.58 KG/M2

## 2020-11-06 DIAGNOSIS — Z01.419 WOMEN'S ANNUAL ROUTINE GYNECOLOGICAL EXAMINATION: Primary | ICD-10-CM

## 2020-11-06 DIAGNOSIS — N80.0 ADENOMYOSIS: ICD-10-CM

## 2020-11-06 DIAGNOSIS — N95.1 PERIMENOPAUSAL SYMPTOM: ICD-10-CM

## 2020-11-06 PROCEDURE — 1036F TOBACCO NON-USER: CPT | Performed by: OBSTETRICS & GYNECOLOGY

## 2020-11-06 PROCEDURE — 3008F BODY MASS INDEX DOCD: CPT | Performed by: OBSTETRICS & GYNECOLOGY

## 2020-11-06 PROCEDURE — 99396 PREV VISIT EST AGE 40-64: CPT | Performed by: OBSTETRICS & GYNECOLOGY

## 2020-11-10 ENCOUNTER — TELEPHONE (OUTPATIENT)
Dept: OBGYN CLINIC | Facility: CLINIC | Age: 55
End: 2020-11-10

## 2020-11-10 DIAGNOSIS — N60.02 CYST, BREAST, LEFT: ICD-10-CM

## 2020-11-16 ENCOUNTER — TELEPHONE (OUTPATIENT)
Dept: OBGYN CLINIC | Facility: CLINIC | Age: 55
End: 2020-11-16

## 2020-11-16 DIAGNOSIS — Z12.31 ENCOUNTER FOR SCREENING MAMMOGRAM FOR MALIGNANT NEOPLASM OF BREAST: ICD-10-CM

## 2020-11-16 DIAGNOSIS — R92.8 ABNORMAL MAMMOGRAM: Primary | ICD-10-CM

## 2021-05-13 DIAGNOSIS — Z12.31 ENCOUNTER FOR SCREENING MAMMOGRAM FOR MALIGNANT NEOPLASM OF BREAST: ICD-10-CM

## 2021-07-30 ENCOUNTER — OFFICE VISIT (OUTPATIENT)
Dept: INTERNAL MEDICINE CLINIC | Facility: CLINIC | Age: 56
End: 2021-07-30
Payer: COMMERCIAL

## 2021-07-30 VITALS
SYSTOLIC BLOOD PRESSURE: 116 MMHG | HEIGHT: 62 IN | WEIGHT: 100.8 LBS | OXYGEN SATURATION: 97 % | BODY MASS INDEX: 18.55 KG/M2 | HEART RATE: 99 BPM | TEMPERATURE: 95.2 F | DIASTOLIC BLOOD PRESSURE: 76 MMHG

## 2021-07-30 DIAGNOSIS — R76.8 POSITIVE ANA (ANTINUCLEAR ANTIBODY): ICD-10-CM

## 2021-07-30 DIAGNOSIS — Z00.00 LABORATORY EXAMINATION ORDERED AS PART OF A ROUTINE GENERAL MEDICAL EXAMINATION: ICD-10-CM

## 2021-07-30 DIAGNOSIS — Z71.9 HEALTH COUNSELING: ICD-10-CM

## 2021-07-30 DIAGNOSIS — E78.49 OTHER HYPERLIPIDEMIA: ICD-10-CM

## 2021-07-30 DIAGNOSIS — K21.9 GASTROESOPHAGEAL REFLUX DISEASE WITHOUT ESOPHAGITIS: Primary | ICD-10-CM

## 2021-07-30 DIAGNOSIS — J30.1 SEASONAL ALLERGIC RHINITIS DUE TO POLLEN: ICD-10-CM

## 2021-07-30 DIAGNOSIS — R10.11 RUQ PAIN: ICD-10-CM

## 2021-07-30 DIAGNOSIS — E55.9 MILD VITAMIN D DEFICIENCY: ICD-10-CM

## 2021-07-30 PROBLEM — F41.9 ANXIETY: Status: ACTIVE | Noted: 2021-07-30

## 2021-07-30 PROCEDURE — 1036F TOBACCO NON-USER: CPT | Performed by: INTERNAL MEDICINE

## 2021-07-30 PROCEDURE — 3008F BODY MASS INDEX DOCD: CPT | Performed by: INTERNAL MEDICINE

## 2021-07-30 PROCEDURE — 3725F SCREEN DEPRESSION PERFORMED: CPT | Performed by: INTERNAL MEDICINE

## 2021-07-30 PROCEDURE — 99214 OFFICE O/P EST MOD 30 MIN: CPT | Performed by: INTERNAL MEDICINE

## 2021-07-30 NOTE — ASSESSMENT & PLAN NOTE
Discussed low acid diet, may take Tums or famotidine prn  Check US to rule out gallstones, already on low fat diet

## 2021-07-30 NOTE — PROGRESS NOTES
Assessment/Plan:    GERD (gastroesophageal reflux disease)  Discussed low acid diet, may take Tums or famotidine prn  Check US to rule out gallstones, already on low fat diet  Positive MARYELLEN (antinuclear antibody)  Denies any joint pain / swelling  Refer to rheumatology as requested  TACS (trigeminal autonomic cephalgias)  Intermittent right sided pain, not interested in medication  Anxiety  Stable, able to cope, no medication  Diagnoses and all orders for this visit:    Gastroesophageal reflux disease without esophagitis    RUQ pain  -     US right upper quadrant; Future    Mild vitamin D deficiency  -     Vitamin D 25 hydroxy; Future    Seasonal allergic rhinitis due to pollen    Other hyperlipidemia  -     Comprehensive metabolic panel  -     Lipid panel  -     TSH, 3rd generation with Free T4 reflex    Positive MARYELLEN (antinuclear antibody)  -     Ambulatory referral to Rheumatology; Future    Health counseling  Comments:  COVID vaccine scheduled  Screening updated  Laboratory examination ordered as part of a routine general medical examination  -     CBC and differential  -     Comprehensive metabolic panel  -     Lipid panel  -     TSH, 3rd generation with Free T4 reflex  -     Vitamin D 25 hydroxy; Future      Schedule annual physical     Subjective:      Patient ID: Mara Ovalles is a 48 y o  female has a few concerns  She reports about 2 to 3 weeks ago, she started to experience burning across her upper abdomen, would feels it on both lower rib area  Symptoms worse when laying down, would notice it when she wakes up early in the mroning  Symptoms would resolve after that, no symptoms later in the day  She reports no nausea or vomiting  No change in diet, she does move her bowels regularly  Yesterday, she experience similar symptoms in the morning but it persisted during the day  She reports occasional sharp pains in the right upper quadrant area and in the right mid back area  She has not tried any over-the-counter medication  This morning she felt nauseous, thinks it may be due to her anxiety  Over a year ago, an eye exam and she was screened for Sjogren's  She tested negative for Sjogren's but tested positive for MARYELLEN  She recalls testing positive for lupus while she was pregnant, was retested and it was negative  She denies any joint pain or swelling  The following portions of the patient's history were reviewed and updated as appropriate: allergies, current medications, past medical history, past social history and problem list     Review of Systems   Constitutional: Negative for appetite change and fatigue  Eyes: Negative for visual disturbance  Respiratory: Negative for cough and shortness of breath  Cardiovascular: Negative for chest pain and leg swelling  Gastrointestinal: Positive for abdominal pain and nausea  Negative for constipation and diarrhea  Genitourinary: Negative for dysuria, frequency and urgency  Musculoskeletal: Negative for arthralgias, joint swelling and myalgias  Skin: Negative for rash and wound  Neurological: Negative for dizziness and headaches  Psychiatric/Behavioral: Negative for confusion  The patient is nervous/anxious  Objective:      /76   Pulse 99   Temp (!) 95 2 °F (35 1 °C)   Ht 5' 2" (1 575 m)   Wt 45 7 kg (100 lb 12 8 oz)   SpO2 97%   BMI 18 44 kg/m²          Physical Exam  Vitals and nursing note reviewed  Constitutional:       General: She is not in acute distress  Appearance: She is well-developed  HENT:      Head: Normocephalic and atraumatic  Eyes:      Pupils: Pupils are equal, round, and reactive to light  Cardiovascular:      Rate and Rhythm: Normal rate and regular rhythm  Heart sounds: Normal heart sounds  Pulmonary:      Effort: Pulmonary effort is normal       Breath sounds: Normal breath sounds  No wheezing  Abdominal:      General: Abdomen is flat   Bowel sounds are normal       Palpations: Abdomen is soft  Tenderness: There is no abdominal tenderness  Negative signs include Tong's sign  Skin:     General: Skin is warm  Findings: No rash  Neurological:      Mental Status: She is alert and oriented to person, place, and time  Psychiatric:         Behavior: Behavior normal            BMI Counseling: Body mass index is 18 44 kg/m²  The BMI is below normal  Patient was advised to gain weight

## 2021-07-30 NOTE — PATIENT INSTRUCTIONS
May take Tums or Pepcid (famotidine) as needed  Diet for Stomach Ulcers and Gastritis   WHAT YOU NEED TO KNOW:   A diet for stomach ulcers and gastritis is a meal plan that limits foods that irritate your stomach  Certain foods may worsen symptoms such as stomach pain, bloating, heartburn, or indigestion  Foods to limit or avoid:  You may need to avoid acidic, spicy, or high-fat foods  Not all foods affect everyone the same way  You will need to learn which foods worsen your symptoms and limit those foods  The following are some foods that may worsen ulcer or gastritis symptoms:  · Beverages:      ? Whole milk and chocolate milk    ? Hot cocoa and cola    ? Any beverage with caffeine    ? Regular and decaffeinated coffee    ? Peppermint and spearmint tea    ? Green and black tea, with or without caffeine    ? Orange and grapefruit juices    ? Drinks that contain alcohol    · Spices and seasonings:      ? Black and red pepper    ? Chili powder    ? Mustard seed and nutmeg    · Other foods:      ? Dairy foods made from whole milk or cream    ? Chocolate    ? Spicy or strongly flavored cheeses, such as jalapeno or black pepper    ? Highly seasoned, high-fat meats, such as sausage, salami, walsh, ham, and cold cuts    ? Hot chiles and peppers    ? Tomato products, such as tomato paste, tomato sauce, or tomato juice    Foods to include:  Eat a variety of healthy foods from all the food groups  Eat fruits, vegetables, whole grains, and fat-free or low-fat dairy foods  Whole grains include whole-wheat breads, cereals, pasta, and brown rice  Choose lean meats, poultry (chicken and turkey), fish, beans, eggs, and nuts  A healthy meal plan is low in unhealthy fats, salt, and added sugar  Healthy fats include olive oil and canola oil  Ask your dietitian for more information about a healthy diet  Other helpful guidelines:   · Do not eat right before bedtime  Stop eating at least 2 hours before bedtime      · Eat small, frequent meals  Your stomach may tolerate small, frequent meals better than large meals  © Copyright [employer] 2021 Information is for End User's use only and may not be sold, redistributed or otherwise used for commercial purposes  All illustrations and images included in CareNotes® are the copyrighted property of A D A M [employer]  or [employer]  The above information is an  only  It is not intended as medical advice for individual conditions or treatments  Talk to your doctor, nurse or pharmacist before following any medical regimen to see if it is safe and effective for you

## 2021-08-03 ENCOUNTER — TELEPHONE (OUTPATIENT)
Dept: INTERNAL MEDICINE CLINIC | Facility: CLINIC | Age: 56
End: 2021-08-03

## 2021-08-03 DIAGNOSIS — K76.89 LIVER CYST: Primary | ICD-10-CM

## 2021-08-04 NOTE — TELEPHONE ENCOUNTER
Do you have previous records regarding the cyst? Old MRI results perhaps so I could compare?     Staff: please fax order to radiology and [facility]

## 2021-08-04 NOTE — TELEPHONE ENCOUNTER
Your last MRI was 2012, last CT was 2015  The CT scan in 2015 did not described the liver cysts in detail but mentioned benign cysts  I will try and reach out to the radiologist who read it, to see if they can compare current images from the ultrasound  You can also give them a call to see if this can be done  We can wait until that evaluation  No need to schedule MRI yet since you have had these for years

## 2021-08-04 NOTE — TELEPHONE ENCOUNTER
Pt would like to speak with you, she does not want to have the ultrasound if she doesn't have to because she knows about the cysts already  She also wants to go to [facility] was wondering if the referral can go to them

## 2021-08-04 NOTE — TELEPHONE ENCOUNTER
Please provide me a copy of this so that I can compare and determine if repeat imaging is necessary  You can drop off at the office or put in [address]

## 2021-08-04 NOTE — TELEPHONE ENCOUNTER
The records that patient has that we do not are older than 2015 and shows that the cyst were getting smaller      Patient states she just doesn't want to go through everything again just to here she has some cysts, she knows that     Now if the cyst are getting bigger etc and you feel it would be best to have the test done , she will

## 2021-08-20 ENCOUNTER — TELEPHONE (OUTPATIENT)
Dept: INTERNAL MEDICINE CLINIC | Facility: CLINIC | Age: 56
End: 2021-08-20

## 2021-08-20 NOTE — TELEPHONE ENCOUNTER
Patient is checking in to see if you were able to get in touch with the radiologist to see if they were able to compare the ultrasound?

## 2021-08-20 NOTE — TELEPHONE ENCOUNTER
Spoke with office, radiologist already compared [address] to [address] from 2015      recommended MRI of the liver with and without contrast     They are going to fax over this over

## 2021-08-20 NOTE — TELEPHONE ENCOUNTER
[place] [facility] [place], also says she had a reaction to the contrast dye which made her "shake like she was having a seizure" wants to be given a smaller dose which helped her in the past

## 2021-08-20 NOTE — TELEPHONE ENCOUNTER
We reached out to them this week  They had recommended to do the MRI  This has been ordered, you can call to schedule

## 2021-08-24 NOTE — TELEPHONE ENCOUNTER
Sent prednisone and Benadryl to take an hour prior to scheduled MRI  Your radiology place may have their own protocol in place also      Staff: please check prior auth

## 2021-08-24 NOTE — TELEPHONE ENCOUNTER
LM for pt to call back     MRI: [facility] [address] [facility] is currently in process (clinical review) will know more tomorrow beings the test is on Thursday

## 2021-08-25 NOTE — TELEPHONE ENCOUNTER
Pt notified  She did change her MRI to 9/3  She said she received a call yesterday from the insurance company who told her it has been approved

## 2021-08-30 NOTE — TELEPHONE ENCOUNTER
Pt aware, would like to know if she can take only the Benadryl and skip the steroid since it makes her BP high in the past  Number given to schedule open MRI, will hold off on the genetic counseling for now

## 2021-08-30 NOTE — TELEPHONE ENCOUNTER
Patient was talking to the pharmacist regarding her symptoms that she gets with the contrast   The pharmacist said it sounds more like a panic attack than an allergic reaction  Patrica Zamora would like to know if she can have an open MRI done and if okay, do you know where she could go? Also since the scan showed "stuff" on her liver, and her mom had MEN1 - should she consider getting this checked?

## 2021-08-30 NOTE — TELEPHONE ENCOUNTER
If you are absolutely sure that you just experienced a panic attack and not an allergy to the contrast, that is up to you  I would still take the medication prior to the study just in case something would occur  You can call the Open MRI in the area (staff, please give # to schedule)  If you would like to be screened for MEN1, I can refer you for genetic counseling  It usually affects the pancreas, not the liver

## 2021-09-07 ENCOUNTER — TELEPHONE (OUTPATIENT)
Dept: INTERNAL MEDICINE CLINIC | Facility: CLINIC | Age: 56
End: 2021-09-07

## 2021-09-07 DIAGNOSIS — R73.01 ABNORMAL FASTING GLUCOSE: ICD-10-CM

## 2021-09-07 DIAGNOSIS — R53.83 OTHER FATIGUE: Primary | ICD-10-CM

## 2021-09-07 NOTE — TELEPHONE ENCOUNTER
Patient notified   Did not end up taking Benadryl or steroids   She had no reaction or symptoms     Patient also wants to know if you can add labs onto her routine BW to check Liver, Pancreas and Pituitary Gland ?     OK to mail

## 2021-09-07 NOTE — TELEPHONE ENCOUNTER
MRI showed 2 hemangiomas, the rest were liver cysts  We can monitor this yearly with an ultrasound  Did you take Benadryl only? No steroids? Did you have any kind of reaction?  Any itching, rash, wheezing etc?

## 2021-09-07 NOTE — TELEPHONE ENCOUNTER
I ordered labs already which checks your liver and thyroid  It is fasting since I checked for your cholesterol also  We do not typically check for pancreatic enzymes unless having abdominal pain or other symptoms  I can refer you to Endocrinology if you would like your pituitary gland to be evaluated  The thyroid test I ordered is one of those tests

## 2021-09-09 ENCOUNTER — HOSPITAL ENCOUNTER (EMERGENCY)
Facility: HOSPITAL | Age: 56
Discharge: HOME | End: 2021-09-09
Attending: EMERGENCY MEDICINE | Admitting: EMERGENCY MEDICINE
Payer: COMMERCIAL

## 2021-09-09 VITALS
RESPIRATION RATE: 20 BRPM | BODY MASS INDEX: 18.43 KG/M2 | SYSTOLIC BLOOD PRESSURE: 117 MMHG | OXYGEN SATURATION: 99 % | WEIGHT: 100.75 LBS | HEART RATE: 76 BPM | DIASTOLIC BLOOD PRESSURE: 71 MMHG

## 2021-09-09 DIAGNOSIS — R10.9 ABDOMINAL PAIN: ICD-10-CM

## 2021-09-09 DIAGNOSIS — R11.2 NAUSEA AND VOMITING: Primary | ICD-10-CM

## 2021-09-09 DIAGNOSIS — R19.7 DIARRHEA: ICD-10-CM

## 2021-09-09 LAB
25(OH)D3 SERPL-MCNC: 35 NG/ML (ref 30–100)
ALBUMIN SERPL BCP-MCNC: 3.7 G/DL (ref 3.5–5)
ALBUMIN SERPL-MCNC: 4.3 G/DL (ref 3.6–5.1)
ALBUMIN/GLOB SERPL: 1.7 (CALC) (ref 1–2.5)
ALP SERPL-CCNC: 77 U/L (ref 37–153)
ALP SERPL-CCNC: 80 U/L (ref 46–116)
ALT SERPL W P-5'-P-CCNC: 21 U/L (ref 12–78)
ALT SERPL-CCNC: 12 U/L (ref 6–29)
ANION GAP SERPL CALCULATED.3IONS-SCNC: 10 MMOL/L (ref 4–13)
AST SERPL W P-5'-P-CCNC: 34 U/L (ref 5–45)
AST SERPL-CCNC: 16 U/L (ref 10–35)
BASOPHILS # BLD AUTO: 0.03 THOUSANDS/ΜL (ref 0–0.1)
BASOPHILS # BLD AUTO: 32 CELLS/UL (ref 0–200)
BASOPHILS NFR BLD AUTO: 0 % (ref 0–1)
BASOPHILS NFR BLD AUTO: 0.6 %
BILIRUB SERPL-MCNC: 0.6 MG/DL (ref 0.2–1.2)
BILIRUB SERPL-MCNC: 0.73 MG/DL (ref 0.2–1)
BUN SERPL-MCNC: 14 MG/DL (ref 7–25)
BUN SERPL-MCNC: 16 MG/DL (ref 5–25)
BUN/CREAT SERPL: NORMAL (CALC) (ref 6–22)
CALCIUM SERPL-MCNC: 9.6 MG/DL (ref 8.6–10.4)
CALCIUM SERPL-MCNC: 9.7 MG/DL (ref 8.3–10.1)
CHLORIDE SERPL-SCNC: 101 MMOL/L (ref 100–108)
CHLORIDE SERPL-SCNC: 101 MMOL/L (ref 98–110)
CHOLEST SERPL-MCNC: 241 MG/DL
CHOLEST/HDLC SERPL: 3.6 (CALC)
CO2 SERPL-SCNC: 25 MMOL/L (ref 21–32)
CO2 SERPL-SCNC: 31 MMOL/L (ref 20–32)
CREAT SERPL-MCNC: 0.85 MG/DL (ref 0.5–1.05)
CREAT SERPL-MCNC: 0.86 MG/DL (ref 0.6–1.3)
EOSINOPHIL # BLD AUTO: 0.08 THOUSAND/ΜL (ref 0–0.61)
EOSINOPHIL # BLD AUTO: 90 CELLS/UL (ref 15–500)
EOSINOPHIL NFR BLD AUTO: 1 % (ref 0–6)
EOSINOPHIL NFR BLD AUTO: 1.7 %
ERYTHROCYTE [DISTWIDTH] IN BLOOD BY AUTOMATED COUNT: 12.5 % (ref 11–15)
ERYTHROCYTE [DISTWIDTH] IN BLOOD BY AUTOMATED COUNT: 12.6 % (ref 11.6–15.1)
GFR SERPL CREATININE-BSD FRML MDRD: 77 ML/MIN/1.73SQ M
GLOBULIN SER CALC-MCNC: 2.6 G/DL (CALC) (ref 1.9–3.7)
GLUCOSE SERPL-MCNC: 118 MG/DL (ref 65–140)
GLUCOSE SERPL-MCNC: 84 MG/DL (ref 65–99)
HCT VFR BLD AUTO: 41.3 % (ref 34.8–46.1)
HCT VFR BLD AUTO: 42.3 % (ref 35–45)
HDLC SERPL-MCNC: 67 MG/DL
HGB BLD-MCNC: 13.9 G/DL (ref 11.5–15.4)
HGB BLD-MCNC: 14.1 G/DL (ref 11.7–15.5)
IMM GRANULOCYTES # BLD AUTO: 0.04 THOUSAND/UL (ref 0–0.2)
IMM GRANULOCYTES NFR BLD AUTO: 1 % (ref 0–2)
LDLC SERPL CALC-MCNC: 155 MG/DL (CALC)
LIPASE SERPL-CCNC: 119 U/L (ref 73–393)
LYMPHOCYTES # BLD AUTO: 2825 CELLS/UL (ref 850–3900)
LYMPHOCYTES # BLD AUTO: 3.31 THOUSANDS/ΜL (ref 0.6–4.47)
LYMPHOCYTES NFR BLD AUTO: 39 % (ref 14–44)
LYMPHOCYTES NFR BLD AUTO: 53.3 %
MCH RBC QN AUTO: 30.1 PG (ref 27–33)
MCH RBC QN AUTO: 30.5 PG (ref 26.8–34.3)
MCHC RBC AUTO-ENTMCNC: 33.3 G/DL (ref 32–36)
MCHC RBC AUTO-ENTMCNC: 33.7 G/DL (ref 31.4–37.4)
MCV RBC AUTO: 90.2 FL (ref 80–100)
MCV RBC AUTO: 91 FL (ref 82–98)
MONOCYTES # BLD AUTO: 0.7 THOUSAND/ΜL (ref 0.17–1.22)
MONOCYTES # BLD AUTO: 530 CELLS/UL (ref 200–950)
MONOCYTES NFR BLD AUTO: 10 %
MONOCYTES NFR BLD AUTO: 8 % (ref 4–12)
NEUTROPHILS # BLD AUTO: 1823 CELLS/UL (ref 1500–7800)
NEUTROPHILS # BLD AUTO: 4.32 THOUSANDS/ΜL (ref 1.85–7.62)
NEUTROPHILS NFR BLD AUTO: 34.4 %
NEUTS SEG NFR BLD AUTO: 51 % (ref 43–75)
NONHDLC SERPL-MCNC: 174 MG/DL (CALC)
NRBC BLD AUTO-RTO: 0 /100 WBCS
PLATELET # BLD AUTO: 231 THOUSANDS/UL (ref 149–390)
PLATELET # BLD AUTO: 244 THOUSAND/UL (ref 140–400)
PMV BLD AUTO: 11.4 FL (ref 8.9–12.7)
PMV BLD REES-ECKER: 12.1 FL (ref 7.5–12.5)
POTASSIUM SERPL-SCNC: 3.8 MMOL/L (ref 3.5–5.3)
POTASSIUM SERPL-SCNC: 4 MMOL/L (ref 3.5–5.3)
PROT SERPL-MCNC: 6.9 G/DL (ref 6.1–8.1)
PROT SERPL-MCNC: 7.3 G/DL (ref 6.4–8.2)
RBC # BLD AUTO: 4.56 MILLION/UL (ref 3.81–5.12)
RBC # BLD AUTO: 4.69 MILLION/UL (ref 3.8–5.1)
SL AMB EGFR AFRICAN AMERICAN: 89 ML/MIN/1.73M2
SL AMB EGFR NON AFRICAN AMERICAN: 77 ML/MIN/1.73M2
SODIUM SERPL-SCNC: 136 MMOL/L (ref 136–145)
SODIUM SERPL-SCNC: 140 MMOL/L (ref 135–146)
TRIGL SERPL-MCNC: 85 MG/DL
TSH SERPL-ACNC: 2.7 MIU/L
WBC # BLD AUTO: 5.3 THOUSAND/UL (ref 3.8–10.8)
WBC # BLD AUTO: 8.48 THOUSAND/UL (ref 4.31–10.16)

## 2021-09-09 PROCEDURE — 85025 COMPLETE CBC W/AUTO DIFF WBC: CPT | Performed by: EMERGENCY MEDICINE

## 2021-09-09 PROCEDURE — 99283 EMERGENCY DEPT VISIT LOW MDM: CPT | Performed by: EMERGENCY MEDICINE

## 2021-09-09 PROCEDURE — 96360 HYDRATION IV INFUSION INIT: CPT

## 2021-09-09 PROCEDURE — 80053 COMPREHEN METABOLIC PANEL: CPT | Performed by: EMERGENCY MEDICINE

## 2021-09-09 PROCEDURE — 36415 COLL VENOUS BLD VENIPUNCTURE: CPT | Performed by: EMERGENCY MEDICINE

## 2021-09-09 PROCEDURE — 83690 ASSAY OF LIPASE: CPT | Performed by: EMERGENCY MEDICINE

## 2021-09-09 PROCEDURE — 99284 EMERGENCY DEPT VISIT MOD MDM: CPT

## 2021-09-09 RX ADMIN — SODIUM CHLORIDE 500 ML: 0.9 INJECTION, SOLUTION INTRAVENOUS at 14:20

## 2021-09-09 NOTE — ED PROVIDER NOTES
History  Chief Complaint   Patient presents with    Vomiting     reports blowing her nose and it was watery orange  reports started vomiting after and it was "like red watery koolaid " reports abdominal pain   Diarrhea     2 episodes of diarrhea today     Patient is a 60-year-old female who presents to the emergency department for evaluation after experiencing vomiting and diarrhea  She shares that for the last 3 weeks she has had intermittent pains in the right upper abdomen which wrap around to the right flank  She occasionally has discomfort in the left upper abdomen as well and was appreciating discomfort at this site today just below the rib  She appreciated slight nausea along with it and thought that eating might help  She ate a small quantity of food and then had a "little diarrhea" which was watery  She felt some nasal congestion after this and blew her nose and was alarmed to notice orange tinge to this  She did not appreciate any purulent-appearing material and had not noticed reflux preceding this  She then had another episode of very loose nonbloody stool followed by some vomiting  She estimates that she had 2 to 3 episodes of emesis  Contents were light pink in coloring and visualized by EMS who indicated it did not appear to be blood  Patient endorsed having consumed red juice as well as a strawberry shortly prior to emesis  EMS found blood pressure to be on the lower side with systolic of 261  Saline bolus was initiated and pressure asad after receipt of 400 mL to 128  She does have significant chills at this time  She does have history GERD  She has not specifically appreciated reflux recently  She does have history of sensitivity to different foods and has recently eliminated lactose from her diet  She expresses concern that the loose stools may be hormonally mediated as she has had a couple of other very large loose stools times with when she would expect to have her menses  LMP was in February  Perimenopausal  Recently when experiencing the right-sided abdominal pain she often notices a large amount of flatus following along with passage of small amount of mucousy rectal discharge  She has had a colonoscopy which was unremarkable denies any recent antibiotic use  No fever  No urinary symptoms  She did recently undergo MRI of the abdomen and shares that she was found to have a hepatic cyst & hemangioma  She reports that her mother had history of pancreatic CA and MEN-1  Patient has concerns that her pituitary may be involved in her abdominal symptoms  Has been given a referral to endocrinology  Prior to Admission Medications   Prescriptions Last Dose Informant Patient Reported? Taking? Calcium-Magnesium-Vitamin D (CALCIUM MAGNESIUM PO) Past Week at Unknown time Self Yes Yes   Sig: one tablet by mouth daily   Omega-3 1000 MG CAPS Past Week at Unknown time Self Yes Yes   Sig: Take by mouth      Facility-Administered Medications: None       Past Medical History:   Diagnosis Date    Allergic     GERD (gastroesophageal reflux disease)     Lactose intolerance        Past Surgical History:   Procedure Laterality Date    BLADDER SURGERY      DILATION AND CURETTAGE OF UTERUS      HERNIA REPAIR      inguinal, left    [place] COLONOSCOPY FLX DX W/COLLJ SPEC WHEN PFRMD N/A 9/12/2018    Procedure: EGD AND COLONOSCOPY;  Surgeon: Abdi Morse MD;  [facility];   Service: Gastroenterology    TONSILLECTOMY         Family History   Problem Relation Age of Onset    Hyperlipidemia Mother     Hypertension Mother     Liver disease Mother     Pancreatic cancer Mother     Mental illness Mother     Colon cancer Father 79    Hypertension Brother     Alcohol abuse Brother     Mental illness Brother     Heart disease Maternal Grandmother     Dementia Maternal Grandmother     Heart disease Maternal Grandfather     Skin cancer Paternal Grandmother     Heart disease Maternal Uncle     Substance Abuse Neg Hx     Depression Neg Hx      I have reviewed and agree with the history as documented  E-Cigarette/Vaping    E-Cigarette Use Former User      E-Cigarette/Vaping Substances    Nicotine No     THC No     CBD No     Flavoring No     Other No     Unknown No      Social History     Tobacco Use    Smoking status: Former Smoker     Packs/day: 2 00     Years: 8 00     Pack years: 16 00     Types: Cigarettes    Smokeless tobacco: Never Used    Tobacco comment: quit age 22   Vaping Use    Vaping Use: Former   Substance Use Topics    Alcohol use: No    Drug use: No       Review of Systems   Constitutional: Negative for fever and unexpected weight change  HENT:        Occasional facial pain  States she appreciates change in appearance of her eyes & that others have noted same  Eyes: Negative for visual disturbance  Respiratory: Negative for shortness of breath  Cardiovascular: Negative for chest pain  Gastrointestinal: Positive for abdominal pain  Skin: Negative  All other systems reviewed and are negative  Physical Exam  Physical Exam  Vitals and nursing note reviewed  Constitutional:       Appearance: Normal appearance  HENT:      Head: Normocephalic  Nose: Nose normal       Mouth/Throat:      Mouth: Mucous membranes are moist    Eyes:      Extraocular Movements: Extraocular movements intact  Conjunctiva/sclera: Conjunctivae normal    Cardiovascular:      Rate and Rhythm: Normal rate and regular rhythm  Pulmonary:      Effort: Pulmonary effort is normal       Breath sounds: Normal breath sounds  Abdominal:      General: Bowel sounds are normal       Palpations: Abdomen is soft  Tenderness: There is abdominal tenderness (Mild epigastric)  There is no right CVA tenderness, left CVA tenderness or guarding  Musculoskeletal:         General: No tenderness  Normal range of motion  Right lower leg: No edema        Left lower leg: No edema  Skin:     General: Skin is warm and dry  Neurological:      General: No focal deficit present  Mental Status: She is alert and oriented to person, place, and time     Psychiatric:         Mood and Affect: Mood normal          Vital Signs  ED Triage Vitals [09/09/21 1341]   Temp Pulse Respirations Blood Pressure SpO2   -- 79 20 140/72 97 %      Temp src Heart Rate Source Patient Position - Orthostatic VS BP Location FiO2 (%)   -- -- -- -- --      Pain Score       4           Vitals:    09/09/21 1445 09/09/21 1515 09/09/21 1530 09/09/21 1600   BP:   124/72 117/71   Pulse: 74 74 78 76         Visual Acuity      ED Medications  Medications   sodium chloride 0 9 % bolus 500 mL (0 mL Intravenous Stopped 9/9/21 1520)       Diagnostic Studies  Results Reviewed     Procedure Component Value Units Date/Time    Comprehensive metabolic panel [[id number]] Collected: 09/09/21 1420    Lab Status: Final result Specimen: Blood from Hand, Right Updated: 09/09/21 1515     Sodium 136 mmol/L      Potassium 4 0 mmol/L      Chloride 101 mmol/L      CO2 25 mmol/L      ANION GAP 10 mmol/L      BUN 16 mg/dL      Creatinine 0 86 mg/dL      Glucose 118 mg/dL      Calcium 9 7 mg/dL      AST 34 U/L      ALT 21 U/L      Alkaline Phosphatase 80 U/L      Total Protein 7 3 g/dL      Albumin 3 7 g/dL      Total Bilirubin 0 73 mg/dL      eGFR 77 ml/min/1 73sq m     Narrative:      [place] guidelines for Chronic Kidney Disease (CKD):     Stage 1 with normal or high GFR (GFR > 90 mL/min/1 73 square meters)    Stage 2 Mild CKD (GFR = 60-89 mL/min/1 73 square meters)    Stage 3A Moderate CKD (GFR = 45-59 mL/min/1 73 square meters)    Stage 3B Moderate CKD (GFR = 30-44 mL/min/1 73 square meters)    Stage 4 Severe CKD (GFR = 15-29 mL/min/1 73 square meters)    Stage 5 End Stage CKD (GFR <15 mL/min/1 73 square meters)  Note: GFR calculation is accurate only with a steady state creatinine    Lipase [[id number]]  (Normal) Collected: 09/09/21 1420    Lab Status: Final result Specimen: Blood from Hand, Right Updated: 09/09/21 1515     Lipase 119 u/L     CBC and differential [[id number]] Collected: 09/09/21 1420    Lab Status: Final result Specimen: Blood from Hand, Right Updated: 09/09/21 1438     WBC 8 48 Thousand/uL      RBC 4 56 Million/uL      Hemoglobin 13 9 g/dL      Hematocrit 41 3 %      MCV 91 fL      MCH 30 5 pg      MCHC 33 7 g/dL      RDW 12 6 %      MPV 11 4 fL      Platelets 509 Thousands/uL      nRBC 0 /100 WBCs      Neutrophils Relative 51 %      Immat GRANS % 1 %      Lymphocytes Relative 39 %      Monocytes Relative 8 %      Eosinophils Relative 1 %      Basophils Relative 0 %      Neutrophils Absolute 4 32 Thousands/µL      Immature Grans Absolute 0 04 Thousand/uL      Lymphocytes Absolute 3 31 Thousands/µL      Monocytes Absolute 0 70 Thousand/µL      Eosinophils Absolute 0 08 Thousand/µL      Basophils Absolute 0 03 Thousands/µL                  No orders to display              Procedures  Procedures         ED Course  ED Course as of Sep 09 2337   Thu Sep 09, 2021   1549 Patient feeling okay at this time  She did have a transient wave of nausea which resolved on its own  She notices slight back stiffness suspects this may be from her positioning in the stretcher  Reviewed study results which have returned  No leukocytosis or thrombocytosis to suggest severe inflammatory/infectious process  Electrolytes, liver function tests and lipase are within normal limits  Uncertain etiology of recent abdominal discomfort and today's vomiting and diarrhea  Encouraged ongoing use of food diary  Patient does have outpatient blood work ordered through PCP including TSH as well as referral to endocrinology  She reports having had very extensive GI evaluation in the past without concerning findings  Potentially symptoms are related to irritable bowel syndrome    Stools have been nonbloody strongly suggesting away from bacterial etiology  Suspect there may be some sensitivity to food  She declines prescription for p r n  Antiemetic  She did also express concern that she does have intermittent swelling just below the right ear lobe  At times she then feels a thumping sensation in her head above this and occasionally nausea  She additionally noticed some discomfort in the right side of the scalp above the year yesterday which was associated with nausea and intermittently experiences swelling of cervical lymph nodes  She does have mild tenderness of the anterior nodes at this time of unclear etiology  Has follow-up appointment with rheumatology given report of abnormal rheumatologic serum panel  Encouraged to keep follow-up appointments  SBIRT 22yo+      Most Recent Value   SBIRT (24 yo +)   In order to provide better care to our patients, we are screening all of our patients for alcohol and drug use  Would it be okay to ask you these screening questions? No Filed at: 09/09/2021 1344                    MDM    Disposition  Final diagnoses:   Nausea and vomiting   Diarrhea   Abdominal pain     Time reflects when diagnosis was documented in both MDM as applicable and the Disposition within this note     Time User Action Codes Description Comment    9/9/2021  3:47 PM Lyndia Andover A Add [R11 2] Nausea and vomiting     9/9/2021  3:47 PM Lyndia Andover A Add [R19 7] Diarrhea     9/9/2021  3:47 PM Lyndia Andover A Add [R10 9] Abdominal pain       ED Disposition     ED Disposition Condition Date/Time Comment    Discharge Stable u Sep 9, 2021  3:47 PM Jerome Peralta discharge to home/self care              Follow-up Information     Follow up With Specialties Details Why Contact Info    Jatinder Shahid MD Internal Medicine Schedule an appointment as soon as possible for a visit   [address] [place] [address],[address]  [id number] [place] [address]  [phone]      Jorge Luis Caputo MD Gastroenterology Schedule an appointment as soon as possible for a visit  If you continue experiencing abdominal discomfort and/or vomiting/diarrhea [address] [address]  [phone]            Discharge Medication List as of 9/9/2021  4:04 PM      CONTINUE these medications which have NOT CHANGED    Details   Calcium-Magnesium-Vitamin D (CALCIUM MAGNESIUM PO) one tablet by mouth daily, Historical Med      Omega-3 1000 MG CAPS Take by mouth, Historical Med           No discharge procedures on file      PDMP Review     None          ED Provider  Electronically Signed by           Sami Campos MD  09/09/21 6685

## 2021-09-10 ENCOUNTER — TELEPHONE (OUTPATIENT)
Dept: INTERNAL MEDICINE CLINIC | Facility: CLINIC | Age: 56
End: 2021-09-10

## 2021-09-10 NOTE — TELEPHONE ENCOUNTER
Patient is aware of labs informed stomach okay today intermittent discomfort and no vomiting  She is feeling better

## 2021-09-10 NOTE — TELEPHONE ENCOUNTER
Lab results: Your cholesterol is slightly worse than previous  The rest of your labs were normal     How is your stomach? Have you had any more vomiting?

## 2021-11-16 ENCOUNTER — TRANSCRIBE ORDERS (OUTPATIENT)
Dept: PAIN MEDICINE | Facility: CLINIC | Age: 56
End: 2021-11-16

## 2021-11-16 ENCOUNTER — TELEPHONE (OUTPATIENT)
Dept: OBGYN CLINIC | Facility: HOSPITAL | Age: 56
End: 2021-11-16

## 2021-11-16 ENCOUNTER — OFFICE VISIT (OUTPATIENT)
Dept: RHEUMATOLOGY | Facility: CLINIC | Age: 56
End: 2021-11-16
Payer: COMMERCIAL

## 2021-11-16 VITALS
DIASTOLIC BLOOD PRESSURE: 70 MMHG | WEIGHT: 100 LBS | HEIGHT: 62 IN | SYSTOLIC BLOOD PRESSURE: 116 MMHG | BODY MASS INDEX: 18.4 KG/M2 | HEART RATE: 70 BPM

## 2021-11-16 DIAGNOSIS — R53.82 CHRONIC FATIGUE: ICD-10-CM

## 2021-11-16 DIAGNOSIS — R76.8 POSITIVE ANA (ANTINUCLEAR ANTIBODY): ICD-10-CM

## 2021-11-16 DIAGNOSIS — R76.8 ANA POSITIVE: Primary | ICD-10-CM

## 2021-11-16 PROCEDURE — 99204 OFFICE O/P NEW MOD 45 MIN: CPT | Performed by: INTERNAL MEDICINE

## 2021-11-16 PROCEDURE — 1036F TOBACCO NON-USER: CPT | Performed by: INTERNAL MEDICINE

## 2021-11-16 PROCEDURE — 3008F BODY MASS INDEX DOCD: CPT | Performed by: INTERNAL MEDICINE

## 2021-12-07 ENCOUNTER — TRANSCRIBE ORDERS (OUTPATIENT)
Dept: PAIN MEDICINE | Facility: CLINIC | Age: 56
End: 2021-12-07

## 2022-02-18 ENCOUNTER — OFFICE VISIT (OUTPATIENT)
Dept: OBGYN CLINIC | Facility: CLINIC | Age: 57
End: 2022-02-18
Payer: COMMERCIAL

## 2022-02-18 VITALS
BODY MASS INDEX: 18.14 KG/M2 | WEIGHT: 98.6 LBS | DIASTOLIC BLOOD PRESSURE: 60 MMHG | SYSTOLIC BLOOD PRESSURE: 110 MMHG | HEIGHT: 62 IN

## 2022-02-18 DIAGNOSIS — R10.31 RLQ ABDOMINAL PAIN: Primary | ICD-10-CM

## 2022-02-18 DIAGNOSIS — R19.03 PELVIC SWELLING, RLQ: ICD-10-CM

## 2022-02-18 PROCEDURE — 99214 OFFICE O/P EST MOD 30 MIN: CPT | Performed by: OBSTETRICS & GYNECOLOGY

## 2022-02-18 NOTE — PROGRESS NOTES
Assessment Christel Matta was seen today for pelvic pain  Diagnoses and all orders for this visit:    RLQ abdominal pain  -     US pelvis complete w transvaginal; Future    Pelvic swelling, RLQ  -     US pelvis complete w transvaginal; Future         Plan   Will obtain US to further evaluate  Encouraged pt to wait to schedule until after she sees her PCP next week in case they want to order other imaging as well  Will call with results and follow up plan based on imaging results  Antonio Roman is a 55 y o  female here for a problem visit  Patient is complaining of RLQ swelling, occasional RLQ pain, suprapubic pain, R sided back pain  States that this has been intermittently happening at least since the summer  She describes the RLQ swelling as if there is an egg shaped lump that gets sporadically bigger and smaller  Notices it several times per week, typically at night when she lies down  Her  has been able to feel it as well  Denies any relationship to food or bowel changes  Has many food sensitivities so typically keeps a food/symptom journal to avoid foods that cause diarrhea  Her suprapubic and back pain is also come and go  Was seen at [facility] urgent care on 2/14 and urine dip showed blood and tr LE but urine culture was negative  Following up with PCP next week for a repeat dip and possible further workup of hematuria  She denies dysuria or judi hematuria  Currently she says she does not feel the swelling but does have suprapubic pain that is mild  She has had a colonoscopy within the last 3 years  Has a history of endometriosis and adenomyosis  LMP was February of last year       Patient Active Problem List   Diagnosis    Copper deficiency    Dystonia    Endometriosis    Idiopathic peripheral neuropathy    Malabsorption syndrome    Mild vitamin D deficiency    TACS (trigeminal autonomic cephalgias)    Small fiber neuropathy    Other hyperlipidemia    Abnormal fasting glucose    Constipation    Coccygeal pain    Costochondritis    Seasonal allergic rhinitis due to pollen    Lead poisoning    GERD (gastroesophageal reflux disease)    Positive MARYELLEN (antinuclear antibody)    Anxiety    Liver cyst         Gynecologic History  No LMP recorded  Patient is perimenopausal   Pap 2018 NILM with neg HR HPV    Obstetric History  OB History    Para Term  AB Living   3 1 1   2 1   SAB IAB Ectopic Multiple Live Births   2       1      # Outcome Date GA Lbr Lam/2nd Weight Sex Delivery Anes PTL Lv   3 Term 02    M Vag-Spont   NKECHI   2 SAB            1 SAB                Past Medical/Surgical/Family/Social History  The following portions of the patient's history were reviewed and updated as appropriate: allergies, current medications, past family history, past medical history, past social history, past surgical history and problem list     Allergies  Sulfa antibiotics, Neomycin, Penicillins, Azithromycin, and Levaquin [levofloxacin]    Medications    Current Outpatient Medications:     Calcium-Magnesium-Vitamin D (CALCIUM MAGNESIUM PO), one tablet by mouth daily, Disp: , Rfl:     Omega-3 1000 MG CAPS, Take by mouth, Disp: , Rfl:       Review of Systems  Constitutional: no fever, feels well  Gastrointestinal: no complaints of abdominal pain, constipation, nausea, vomiting  Genitourinary : see HPI       Objective     /60   Ht 5' 2" (1 575 m)   Wt 44 7 kg (98 lb 9 6 oz)   BMI 18 03 kg/m²     General: alert and oriented, in no acute distress   Abdomen: soft, NT, ND normal bowel sounds  No suprapubic tenderness    Vulva: normal, Bartholin's, Urethra, [place]'s normal  Vagina: pale, atrophic appearing mucosa  Cervix:  no cervical motion tenderness  Uterus:  mobile, non-tender, size consistent with 8 weeks  Adnexa: fullness appreciated on the R side that is nontender, no fullness on the L

## 2022-02-22 ENCOUNTER — OFFICE VISIT (OUTPATIENT)
Dept: INTERNAL MEDICINE CLINIC | Facility: CLINIC | Age: 57
End: 2022-02-22
Payer: COMMERCIAL

## 2022-02-22 VITALS
DIASTOLIC BLOOD PRESSURE: 90 MMHG | OXYGEN SATURATION: 97 % | TEMPERATURE: 97.4 F | BODY MASS INDEX: 18.14 KG/M2 | SYSTOLIC BLOOD PRESSURE: 124 MMHG | HEIGHT: 62 IN | HEART RATE: 103 BPM | WEIGHT: 98.6 LBS

## 2022-02-22 DIAGNOSIS — R31.29 OTHER MICROSCOPIC HEMATURIA: Primary | ICD-10-CM

## 2022-02-22 DIAGNOSIS — R42 VERTIGO: ICD-10-CM

## 2022-02-22 DIAGNOSIS — R10.9 RIGHT FLANK PAIN: ICD-10-CM

## 2022-02-22 PROCEDURE — 3008F BODY MASS INDEX DOCD: CPT | Performed by: NURSE PRACTITIONER

## 2022-02-22 PROCEDURE — 99213 OFFICE O/P EST LOW 20 MIN: CPT | Performed by: NURSE PRACTITIONER

## 2022-02-22 PROCEDURE — 1036F TOBACCO NON-USER: CPT | Performed by: NURSE PRACTITIONER

## 2022-02-22 NOTE — PROGRESS NOTES
Assessment/Plan:     Diagnoses and all orders for this visit:    Other microscopic hematuria  Comments:  check urine and ultrasound due to hematuria in UA and right flank pain  Orders:  -     US kidney and bladder; Future  -     UA w Reflex to Microscopic w Reflex to Culture -Lab Collect; Future  -     CBC and differential; Future  -     Comprehensive metabolic panel; Future    Vertigo  Comments:  resolved  continue vertigo exercises  if reoccurs discussed vestibular therapy  ok to take meclizine as needed  Right flank pain          Subjective:      Patient ID: Jennifer Barbosa is a 55 y o  female  Here today for ER follow up  Cecile Dave was seen in the ER on 2/14 with complaints of dizziness  Dizziness started a few weeks ago intermittently but became more constant which prompted her ER visit  Dizziness occurred with movement, sitting to standing  Since the ER, she has started doing vertigo exercises that she learned from a friend  She has had no further vertigo  She did not take any meclizine  Her urine test showed blood  She was having right flank pain  She continues to have intermittent right flank pain that radiates to the right groin  She saw GYN who ordered a pelvic ultrasound  She denies any gross hematuria, dysuria, or frequency  She does however feel an ache in her bladder area when she gets the pain  She denies any n/v/d  The following portions of the patient's history were reviewed and updated as appropriate: allergies, current medications, past family history, past medical history, past social history, past surgical history and problem list     Review of Systems   Constitutional: Negative for activity change, appetite change, fatigue and fever  Eyes: Negative for visual disturbance  Respiratory: Negative for shortness of breath  Cardiovascular: Negative for chest pain  Gastrointestinal: Positive for abdominal pain   Negative for constipation, diarrhea, nausea and vomiting  Genitourinary: Positive for flank pain and pelvic pain  Negative for decreased urine volume, difficulty urinating, dysuria, hematuria and vaginal bleeding  Skin: Negative for rash  Neurological: Negative for dizziness, light-headedness and headaches  Objective:      /90   Pulse 103   Temp (!) 97 4 °F (36 3 °C)   Ht 5' 2" (1 575 m)   Wt 44 7 kg (98 lb 9 6 oz)   SpO2 97%   BMI 18 03 kg/m²          Physical Exam  Vitals reviewed  Constitutional:       Appearance: Normal appearance  HENT:      Head: Normocephalic and atraumatic  Eyes:      Conjunctiva/sclera: Conjunctivae normal    Cardiovascular:      Rate and Rhythm: Normal rate and regular rhythm  Heart sounds: Normal heart sounds  Pulmonary:      Breath sounds: Normal breath sounds  Abdominal:      General: Bowel sounds are normal       Palpations: Abdomen is soft  Tenderness: There is no abdominal tenderness  There is right CVA tenderness  Musculoskeletal:         General: Normal range of motion  Skin:     General: Skin is warm and dry  Findings: No rash  Neurological:      General: No focal deficit present  Mental Status: She is alert and oriented to person, place, and time     Psychiatric:         Mood and Affect: Mood normal          Behavior: Behavior normal

## 2022-02-25 LAB
ALBUMIN SERPL-MCNC: 4.5 G/DL (ref 3.6–5.1)
ALBUMIN/GLOB SERPL: 2 (CALC) (ref 1–2.5)
ALP SERPL-CCNC: 75 U/L (ref 37–153)
ALT SERPL-CCNC: 12 U/L (ref 6–29)
APPEARANCE UR: CLEAR
AST SERPL-CCNC: 18 U/L (ref 10–35)
BACTERIA UR QL AUTO: ABNORMAL /HPF
BASOPHILS # BLD AUTO: 30 CELLS/UL (ref 0–200)
BASOPHILS NFR BLD AUTO: 0.6 %
BILIRUB SERPL-MCNC: 0.9 MG/DL (ref 0.2–1.2)
BILIRUB UR QL STRIP: NEGATIVE
BUN SERPL-MCNC: 19 MG/DL (ref 7–25)
BUN/CREAT SERPL: NORMAL (CALC) (ref 6–22)
CALCIUM SERPL-MCNC: 9.6 MG/DL (ref 8.6–10.4)
CHLORIDE SERPL-SCNC: 100 MMOL/L (ref 98–110)
CO2 SERPL-SCNC: 29 MMOL/L (ref 20–32)
COLOR UR: YELLOW
CREAT SERPL-MCNC: 0.84 MG/DL (ref 0.5–1.05)
EOSINOPHIL # BLD AUTO: 80 CELLS/UL (ref 15–500)
EOSINOPHIL NFR BLD AUTO: 1.6 %
ERYTHROCYTE [DISTWIDTH] IN BLOOD BY AUTOMATED COUNT: 12.9 % (ref 11–15)
GLOBULIN SER CALC-MCNC: 2.3 G/DL (CALC) (ref 1.9–3.7)
GLUCOSE SERPL-MCNC: 85 MG/DL (ref 65–99)
GLUCOSE UR QL STRIP: NEGATIVE
HCT VFR BLD AUTO: 42.3 % (ref 35–45)
HGB BLD-MCNC: 14.3 G/DL (ref 11.7–15.5)
HGB UR QL STRIP: ABNORMAL
HYALINE CASTS #/AREA URNS LPF: ABNORMAL /LPF
KETONES UR QL STRIP: NEGATIVE
LEUKOCYTE ESTERASE UR QL STRIP: ABNORMAL
LYMPHOCYTES # BLD AUTO: 2735 CELLS/UL (ref 850–3900)
LYMPHOCYTES NFR BLD AUTO: 54.7 %
MCH RBC QN AUTO: 30.6 PG (ref 27–33)
MCHC RBC AUTO-ENTMCNC: 33.8 G/DL (ref 32–36)
MCV RBC AUTO: 90.4 FL (ref 80–100)
MONOCYTES # BLD AUTO: 480 CELLS/UL (ref 200–950)
MONOCYTES NFR BLD AUTO: 9.6 %
NEUTROPHILS # BLD AUTO: 1675 CELLS/UL (ref 1500–7800)
NEUTROPHILS NFR BLD AUTO: 33.5 %
NITRITE UR QL STRIP: NEGATIVE
PH UR STRIP: 7.5 [PH] (ref 5–8)
PLATELET # BLD AUTO: 220 THOUSAND/UL (ref 140–400)
PMV BLD REES-ECKER: 11.8 FL (ref 7.5–12.5)
POTASSIUM SERPL-SCNC: 3.6 MMOL/L (ref 3.5–5.3)
PROT SERPL-MCNC: 6.8 G/DL (ref 6.1–8.1)
PROT UR QL STRIP: NEGATIVE
RBC # BLD AUTO: 4.68 MILLION/UL (ref 3.8–5.1)
RBC #/AREA URNS HPF: ABNORMAL /HPF
SL AMB EGFR AFRICAN AMERICAN: 91 ML/MIN/1.73M2
SL AMB EGFR NON AFRICAN AMERICAN: 78 ML/MIN/1.73M2
SODIUM SERPL-SCNC: 138 MMOL/L (ref 135–146)
SP GR UR STRIP: 1.01 (ref 1–1.03)
SQUAMOUS #/AREA URNS HPF: ABNORMAL /HPF
WBC # BLD AUTO: 5 THOUSAND/UL (ref 3.8–10.8)
WBC #/AREA URNS HPF: ABNORMAL /HPF

## 2022-04-26 ENCOUNTER — OFFICE VISIT (OUTPATIENT)
Dept: INTERNAL MEDICINE CLINIC | Facility: CLINIC | Age: 57
End: 2022-04-26
Payer: COMMERCIAL

## 2022-04-26 VITALS
TEMPERATURE: 96.4 F | HEART RATE: 92 BPM | HEIGHT: 62 IN | BODY MASS INDEX: 18.33 KG/M2 | DIASTOLIC BLOOD PRESSURE: 78 MMHG | WEIGHT: 99.6 LBS | OXYGEN SATURATION: 98 % | SYSTOLIC BLOOD PRESSURE: 120 MMHG

## 2022-04-26 DIAGNOSIS — E78.49 OTHER HYPERLIPIDEMIA: ICD-10-CM

## 2022-04-26 DIAGNOSIS — R23.8 EASY BRUISING: Primary | ICD-10-CM

## 2022-04-26 DIAGNOSIS — Z00.00 LABORATORY EXAMINATION ORDERED AS PART OF A ROUTINE GENERAL MEDICAL EXAMINATION: ICD-10-CM

## 2022-04-26 DIAGNOSIS — E55.9 MILD VITAMIN D DEFICIENCY: ICD-10-CM

## 2022-04-26 DIAGNOSIS — R73.01 ABNORMAL FASTING GLUCOSE: ICD-10-CM

## 2022-04-26 PROCEDURE — 99213 OFFICE O/P EST LOW 20 MIN: CPT | Performed by: INTERNAL MEDICINE

## 2022-04-26 PROCEDURE — 1036F TOBACCO NON-USER: CPT | Performed by: INTERNAL MEDICINE

## 2022-04-26 PROCEDURE — 3008F BODY MASS INDEX DOCD: CPT | Performed by: INTERNAL MEDICINE

## 2022-04-26 NOTE — PROGRESS NOTES
Assessment/Plan:    Positive MARYELLEN (antinuclear antibody)  Saw rheumatology  MARYELLEN remains elevated  Other hyperlipidemia  On low fat diet, has been taking fish oil capsule for years  Diagnoses and all orders for this visit:    Easy bruising  Comments:  Recent CBC normal  Stop fish oil capsules  Orders:  -     Cancel: CBC and differential; Future  -     CBC and differential; Future    Other hyperlipidemia  -     Cancel: Comprehensive metabolic panel; Future  -     Cancel: Lipid panel; Future  -     Cancel: TSH, 3rd generation with Free T4 reflex; Future  -     Comprehensive metabolic panel; Future  -     Lipid panel; Future  -     TSH, 3rd generation with Free T4 reflex; Future    Mild vitamin D deficiency  -     Cancel: Vitamin D 25 hydroxy; Future  -     Vitamin D 25 hydroxy; Future    Abnormal fasting glucose  -     Cancel: Hemoglobin A1C; Future  -     Hemoglobin A1C; Future    Laboratory examination ordered as part of a routine general medical examination  -     Cancel: CBC and differential; Future  -     Cancel: Comprehensive metabolic panel; Future  -     Cancel: Hemoglobin A1C; Future  -     Cancel: Lipid panel; Future  -     Cancel: TSH, 3rd generation with Free T4 reflex; Future  -     Cancel: Vitamin D 25 hydroxy; Future  -     Cancel: Vitamin B12; Future  -     CBC and differential; Future  -     Comprehensive metabolic panel; Future  -     Hemoglobin A1C; Future  -     Lipid panel; Future  -     TSH, 3rd generation with Free T4 reflex; Future  -     Vitamin B12; Future  -     Vitamin D 25 hydroxy; Future      Follow up as scheduled or as needed  Subjective:      Patient ID: Latrice Gomes is a 55 y o  female  She reports easy bruising for years, but noted occurring more frequently recently  She noticed a few areas of healing bruises on both her legs  She cannot recall any injury or falls  She does easily bruise when she hits her arms somewhere  Denies any bleeding      She takes a fish oil capsule and vitamin D3 daily  It has been over a year since her last menstrual period  She saw rheumatology and had repeat blood work, she was concerned because her MARYELLEN number went up  The following portions of the patient's history were reviewed and updated as appropriate: allergies, current medications, past medical history, past social history and problem list     Review of Systems   Constitutional: Negative for activity change and appetite change  Musculoskeletal: Negative for arthralgias and myalgias  Skin: Positive for color change  Neurological: Negative for weakness and numbness  Objective:      /78   Pulse 92   Temp (!) 96 4 °F (35 8 °C)   Ht 5' 2" (1 575 m)   Wt 45 2 kg (99 lb 9 6 oz)   SpO2 98%   BMI 18 22 kg/m²          Physical Exam  Constitutional:       General: She is not in acute distress  Appearance: Normal appearance  She is not ill-appearing  Eyes:      Pupils: Pupils are equal, round, and reactive to light  Pulmonary:      Effort: Pulmonary effort is normal  No respiratory distress  Skin:     Findings: Bruising present  No erythema or rash  Comments: No mass, tenderness or erythema on BLE   Neurological:      General: No focal deficit present  Mental Status: She is alert and oriented to person, place, and time     Psychiatric:         Mood and Affect: Mood normal          Behavior: Behavior normal

## 2022-05-06 DIAGNOSIS — R92.2 DENSE BREAST TISSUE: ICD-10-CM

## 2022-05-06 DIAGNOSIS — Z12.31 ENCOUNTER FOR SCREENING MAMMOGRAM FOR MALIGNANT NEOPLASM OF BREAST: Primary | ICD-10-CM

## 2022-05-19 ENCOUNTER — TELEPHONE (OUTPATIENT)
Dept: INTERNAL MEDICINE CLINIC | Facility: CLINIC | Age: 57
End: 2022-05-19

## 2022-05-19 NOTE — TELEPHONE ENCOUNTER
On Friday, pt had a stomach ache before dinner with pain under her right rib  She had diarrhea all through the night and into Saturday  She kept to a bland diet, cheerios, English muffin  All watery stools, nothing solid  Orange in color  Diarrhea subsided yesterday  This morning, ate rice cereal, and diarrhea returned - yellow in color  She'd like to know what she can do or take?

## 2022-05-19 NOTE — TELEPHONE ENCOUNTER
Any vomiting? Fever or blood in the stool? Are your stools all water or with solid stool? Continue bland diet  Recommend against Imodium  Keep hydrated, especially after each bowel movement

## 2022-06-30 ENCOUNTER — ANNUAL EXAM (OUTPATIENT)
Dept: OBGYN CLINIC | Facility: CLINIC | Age: 57
End: 2022-06-30
Payer: COMMERCIAL

## 2022-06-30 VITALS
BODY MASS INDEX: 17.85 KG/M2 | HEIGHT: 62 IN | DIASTOLIC BLOOD PRESSURE: 78 MMHG | WEIGHT: 97 LBS | SYSTOLIC BLOOD PRESSURE: 120 MMHG

## 2022-06-30 DIAGNOSIS — R63.6 UNDERWEIGHT: ICD-10-CM

## 2022-06-30 DIAGNOSIS — Z12.31 ENCOUNTER FOR SCREENING MAMMOGRAM FOR BREAST CANCER: ICD-10-CM

## 2022-06-30 DIAGNOSIS — Z01.419 WELL WOMAN EXAM WITH ROUTINE GYNECOLOGICAL EXAM: Primary | ICD-10-CM

## 2022-06-30 PROCEDURE — 3008F BODY MASS INDEX DOCD: CPT | Performed by: OBSTETRICS & GYNECOLOGY

## 2022-06-30 PROCEDURE — 99396 PREV VISIT EST AGE 40-64: CPT | Performed by: OBSTETRICS & GYNECOLOGY

## 2022-06-30 PROCEDURE — 1036F TOBACCO NON-USER: CPT | Performed by: OBSTETRICS & GYNECOLOGY

## 2022-06-30 NOTE — PROGRESS NOTES
ASSESSMENT & PLAN:   Diagnoses and all orders for this visit:    Well woman exam with routine gynecological exam    Encounter for screening mammogram for breast cancer  -     Mammo screening bilateral w 3d & cad; Future    Underweight  -     Ambulatory Referral to Nutrition Services; Future          The following were reviewed in today's visit: ASCCP guidelines, breast self exam, mammography screening ordered, menopause, osteoporosis, adequate intake of calcium and vitamin D, exercise and healthy diet  Referred to nutrition for help with weight gain in the setting of hyperlipidemia  Discussed need for early osteoporosis screening  Discussed coconut oil or silicone based lubricants PRN  Try compression style underwear for vulvar varicosity  Patient to return to office in yearly for annual exam      All questions have been answered to her satisfaction  CC:  Annual Gynecologic Examination  Chief Complaint   Patient presents with    Gynecologic Exam     [facility]'s [facility]: [facility]    Last Yearly: 11/4/19  Last Pap: 9/17/18 -/- Due 2023  Mammo: 5/12/22 (1-)  Colon: @ 47 or 53yr old -Recall 5yrs per pt  HPI: Bebe Carvalho is a [address] y o  [id number] who presents for annual gynecologic examination  She has the following concerns:      1  Breast tenderness: mostly on the L but also happens on the R  Pain comes and goes  First started a few months ago  Improves with alternating ice/heat  Has since had a mammogram that was normal  Worse on the L outer quadrant particularly with pressure from sleeping  No skin changes or nipple discharge  Does not wear underwire bras  Has noticed some improvement with different bras  2  Labia pain/burning: some days worse than others  Ongoing for a few months  Comes and goes  Improves with pressure  Denies dysuria, hematuria  3  Pain with sex  Started very recently  Feels like her partner "hit a spot" that was particularly tender inside her vagina a few weeks ago  Since then has had sex without pain though she has felt more worried/tense about  Does sometimes have some dryness  No bleeding  Health Maintenance:    Exercise: infrequently  Breast exams/breast awareness: yes  Diet: well balanced diet - trying to gain weight while manage her hyperlipidemia  Last mammogram: 22 birads-1   Colorectal cancer screenin, repeat 5 years      Past Medical History:   Diagnosis Date    Allergic     GERD (gastroesophageal reflux disease)     Lactose intolerance        Past Surgical History:   Procedure Laterality Date    BLADDER SURGERY      DILATION AND CURETTAGE OF UTERUS      HERNIA REPAIR      inguinal, left    ID COLONOSCOPY FLX DX W/COLLJ SPEC WHEN PFRMD N/A 2018    Procedure: EGD AND COLONOSCOPY;  Surgeon: Ragini Santana MD;  [facility]; Service: Gastroenterology    TONSILLECTOMY         Past OB/Gyn History:   Patient's last menstrual period was 2021 (approximate)  Menopausal status: postmenopausal - LMP 2021  Menopausal symptoms: hot flushes, slightly improving    Last Pap: 2018 NILM, neg HR HPV  History of abnormal Pap smear: yes - but upon repeat are normal    Patient is currently sexually active           Family History  Family History   Problem Relation Age of Onset    Hyperlipidemia Mother     Hypertension Mother     Liver disease Mother     Pancreatic cancer Mother     Mental illness Mother     Colon cancer Father 79    Hypertension Brother     Alcohol abuse Brother     Mental illness Brother     Heart disease Maternal Grandmother     Dementia Maternal Grandmother     Heart disease Maternal Grandfather     Skin cancer Paternal Grandmother     Heart disease Maternal Uncle     Substance Abuse Neg Hx     Depression Neg Hx        Family history of uterine or ovarian cancer: no  Family history of breast cancer: no  Family history of colon cancer: yes    Social History:  Social History     Socioeconomic History    Marital status: /Civil Union     Spouse name: Not on file    Number of children: Not on file    Years of education: Not on file    Highest education level: Not on file   Occupational History    Not on file   Tobacco Use    Smoking status: Former Smoker     Packs/day: 2 00     Years: 8 00     Pack years: 16 00     Types: Cigarettes    Smokeless tobacco: Never Used    Tobacco comment: quit age 22   Vaping Use    Vaping Use: Former   Substance and Sexual Activity    Alcohol use: No    Drug use: Not Currently     Types: Marijuana    Sexual activity: Yes     Partners: Male     Birth control/protection: Condom Male, Post-menopausal   Other Topics Concern    Not on file   Social History Narrative    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         Social Determinants of Health     Financial Resource Strain: Not on file   Food Insecurity: Not on file   Transportation Needs: Not on file   Physical Activity: Not on file   Stress: Not on file   Social Connections: Not on file   Intimate Partner Violence: Not on file   Housing Stability: Not on file     Domestic violence screen: negative    Allergies: Allergies   Allergen Reactions    Sulfa Antibiotics Hives    Neomycin Hives    Penicillins      Reaction Date: 12Nov2009;     Azithromycin Rash    Levaquin [Levofloxacin] Tachycardia       Medications:    Current Outpatient Medications:     Omega-3 1000 MG CAPS, Take by mouth, Disp: , Rfl:     Calcium-Magnesium-Vitamin D (CALCIUM MAGNESIUM PO), one tablet by mouth daily (Patient not taking: Reported on 6/30/2022), Disp: , Rfl:     Review of Systems:  Denies fevers, chills, excessive fatigue, chest pain, shortness of breath, abdominal pain, nausea, vomiting, urinary incontinence, urinary frequency, vaginal bleeding, vaginal discharge  All other systems negative unless otherwise stated         Physical Exam:  /78 (BP Location: Right arm, Patient Position: Sitting, Cuff Size: Standard)   Ht 5' 1 75" (1 568 m)   Wt 44 kg (97 lb)   LMP 02/27/2021 (Approximate)   BMI 17 89 kg/m²  Body mass index is 17 89 kg/m²  GEN: The patient was alert and oriented x3, pleasant well-appearing female in no acute distress  HEENT:  Unremarkable, no anterior or posterior lymphadenopathy, no thyromegaly  CV:  Regular rate and rhythm, normal S1 and S2, no murmurs  RESP:  Clear to auscultation bilaterally, no wheezes, rales or rhonchi  BREAST:  Symmetric breasts with no palpable breast masses or obvious breast lesions  She has no retractions or nipple discharge  She has no axillary abnormalities or palpable masses  GI:  Soft, non-distended  MSK: bilateral lower extremities are nontender, no edema  : Normal appearing external female genitalia with a small varicosity on the medial portion of the right labia minora near the clitoris  Normal appearing urethral meatus  On sterile speculum exam, atrophic appearing vaginal epithelium, no vaginal discharge, no bleeding, grossly normal appearing cervix  On bimanual exam, no cervical motion tenderness; uterus is smooth, mobile, nontender 6 weeks size   Some tenderness in the RLQ but no tenderness or fullness in the adnexa

## 2022-07-01 LAB
25(OH)D3 SERPL-MCNC: 48 NG/ML (ref 30–100)
ALBUMIN SERPL-MCNC: 4.2 G/DL (ref 3.6–5.1)
ALBUMIN/GLOB SERPL: 1.9 (CALC) (ref 1–2.5)
ALP SERPL-CCNC: 75 U/L (ref 37–153)
ALT SERPL-CCNC: 13 U/L (ref 6–29)
AST SERPL-CCNC: 17 U/L (ref 10–35)
BASOPHILS # BLD AUTO: 29 CELLS/UL (ref 0–200)
BASOPHILS NFR BLD AUTO: 0.6 %
BILIRUB SERPL-MCNC: 0.4 MG/DL (ref 0.2–1.2)
BUN SERPL-MCNC: 17 MG/DL (ref 7–25)
BUN/CREAT SERPL: NORMAL (CALC) (ref 6–22)
CALCIUM SERPL-MCNC: 9.1 MG/DL (ref 8.6–10.4)
CHLORIDE SERPL-SCNC: 101 MMOL/L (ref 98–110)
CHOLEST SERPL-MCNC: 207 MG/DL
CHOLEST/HDLC SERPL: 2.9 (CALC)
CO2 SERPL-SCNC: 31 MMOL/L (ref 20–32)
CREAT SERPL-MCNC: 0.88 MG/DL (ref 0.5–1.05)
EOSINOPHIL # BLD AUTO: 82 CELLS/UL (ref 15–500)
EOSINOPHIL NFR BLD AUTO: 1.7 %
ERYTHROCYTE [DISTWIDTH] IN BLOOD BY AUTOMATED COUNT: 12.9 % (ref 11–15)
GLOBULIN SER CALC-MCNC: 2.2 G/DL (CALC) (ref 1.9–3.7)
GLUCOSE SERPL-MCNC: 84 MG/DL (ref 65–99)
HBA1C MFR BLD: 5.3 % OF TOTAL HGB
HCT VFR BLD AUTO: 41.4 % (ref 35–45)
HDLC SERPL-MCNC: 72 MG/DL
HGB BLD-MCNC: 14 G/DL (ref 11.7–15.5)
LDLC SERPL CALC-MCNC: 120 MG/DL (CALC)
LYMPHOCYTES # BLD AUTO: 2770 CELLS/UL (ref 850–3900)
LYMPHOCYTES NFR BLD AUTO: 57.7 %
MCH RBC QN AUTO: 30.7 PG (ref 27–33)
MCHC RBC AUTO-ENTMCNC: 33.8 G/DL (ref 32–36)
MCV RBC AUTO: 90.8 FL (ref 80–100)
MONOCYTES # BLD AUTO: 470 CELLS/UL (ref 200–950)
MONOCYTES NFR BLD AUTO: 9.8 %
NEUTROPHILS # BLD AUTO: 1450 CELLS/UL (ref 1500–7800)
NEUTROPHILS NFR BLD AUTO: 30.2 %
NONHDLC SERPL-MCNC: 135 MG/DL (CALC)
PLATELET # BLD AUTO: 203 THOUSAND/UL (ref 140–400)
PMV BLD REES-ECKER: 12.1 FL (ref 7.5–12.5)
POTASSIUM SERPL-SCNC: 3.8 MMOL/L (ref 3.5–5.3)
PROT SERPL-MCNC: 6.4 G/DL (ref 6.1–8.1)
RBC # BLD AUTO: 4.56 MILLION/UL (ref 3.8–5.1)
SL AMB EGFR AFRICAN AMERICAN: 86 ML/MIN/1.73M2
SL AMB EGFR NON AFRICAN AMERICAN: 74 ML/MIN/1.73M2
SODIUM SERPL-SCNC: 138 MMOL/L (ref 135–146)
TRIGL SERPL-MCNC: 62 MG/DL
TSH SERPL-ACNC: 2.08 MIU/L
VIT B12 SERPL-MCNC: 361 PG/ML (ref 200–1100)
WBC # BLD AUTO: 4.8 THOUSAND/UL (ref 3.8–10.8)

## 2022-07-05 ENCOUNTER — TELEPHONE (OUTPATIENT)
Dept: INTERNAL MEDICINE CLINIC | Facility: CLINIC | Age: 57
End: 2022-07-05

## 2022-07-05 NOTE — TELEPHONE ENCOUNTER
Lab results: Your cholesterol has improved  Please make sure you are taking vitamin B12 500 mcg daily, levels a bit low  The rest of your labs were normal     I will see you at your appointment next month

## 2022-07-21 PROBLEM — E53.8 VITAMIN B12 DEFICIENCY: Status: ACTIVE | Noted: 2022-07-21

## 2022-08-01 ENCOUNTER — OFFICE VISIT (OUTPATIENT)
Dept: INTERNAL MEDICINE CLINIC | Facility: CLINIC | Age: 57
End: 2022-08-01
Payer: COMMERCIAL

## 2022-08-01 VITALS
HEART RATE: 81 BPM | TEMPERATURE: 97 F | OXYGEN SATURATION: 99 % | HEIGHT: 62 IN | DIASTOLIC BLOOD PRESSURE: 70 MMHG | SYSTOLIC BLOOD PRESSURE: 128 MMHG | BODY MASS INDEX: 18.03 KG/M2 | WEIGHT: 98 LBS

## 2022-08-01 DIAGNOSIS — R23.3 EASY BRUISING: ICD-10-CM

## 2022-08-01 DIAGNOSIS — K21.9 GASTROESOPHAGEAL REFLUX DISEASE WITHOUT ESOPHAGITIS: ICD-10-CM

## 2022-08-01 DIAGNOSIS — G56.21 ULNAR NEUROPATHY OF RIGHT UPPER EXTREMITY: ICD-10-CM

## 2022-08-01 DIAGNOSIS — G47.09 OTHER INSOMNIA: ICD-10-CM

## 2022-08-01 DIAGNOSIS — E53.8 VITAMIN B12 DEFICIENCY: ICD-10-CM

## 2022-08-01 DIAGNOSIS — K76.89 LIVER CYST: ICD-10-CM

## 2022-08-01 DIAGNOSIS — R63.6 UNDERWEIGHT: ICD-10-CM

## 2022-08-01 DIAGNOSIS — F41.9 ANXIETY: ICD-10-CM

## 2022-08-01 DIAGNOSIS — R76.8 POSITIVE ANA (ANTINUCLEAR ANTIBODY): Primary | ICD-10-CM

## 2022-08-01 DIAGNOSIS — R35.1 NOCTURIA: ICD-10-CM

## 2022-08-01 DIAGNOSIS — E78.49 OTHER HYPERLIPIDEMIA: ICD-10-CM

## 2022-08-01 PROBLEM — U07.1 COVID-19 VIRUS INFECTION: Status: ACTIVE | Noted: 2022-08-01

## 2022-08-01 PROBLEM — H91.90 HEARING LOSS: Status: ACTIVE | Noted: 2022-08-01

## 2022-08-01 PROBLEM — U07.1 COVID-19 VIRUS INFECTION: Status: RESOLVED | Noted: 2022-08-01 | Resolved: 2022-08-01

## 2022-08-01 PROCEDURE — 99214 OFFICE O/P EST MOD 30 MIN: CPT | Performed by: INTERNAL MEDICINE

## 2022-08-01 PROCEDURE — 3725F SCREEN DEPRESSION PERFORMED: CPT | Performed by: INTERNAL MEDICINE

## 2022-08-01 NOTE — PROGRESS NOTES
Assessment/Plan:    Positive MARYELLEN (antinuclear antibody)  Encouraged to see rheumatology again  Reviewed labs again  GERD (gastroesophageal reflux disease)  Minimal symptoms  Anxiety  Symptoms worse recently  Declined counseling, medication  TACS (trigeminal autonomic cephalgias)  Intermittent symptoms  Other hyperlipidemia  Lipids improved, remains elevated  Taking omega-3  Vitamin B12 deficiency  Started daily supplement  Ulnar neuropathy of right upper extremity  Using wrist brace, still with symptoms  Agrees to see Ortho  Liver cyst  Repeat ultrasound  Other insomnia  Recommend to see sleep again  Declines melatonin  Briefly discussed medications  Underweight  Stable weight  Hearing loss  Refer for hearing exam          Diagnoses and all orders for this visit:    Positive MARYELLEN (antinuclear antibody)    Nocturia  Comments:  May be affecting sleep  Orders:  -     UA (URINE) with reflex to Scope  -     Ambulatory Referral to Urogynecology; Future    Ulnar neuropathy of right upper extremity  -     Ambulatory referral to Orthopedic Surgery; Future    Liver cyst  -     US abdomen limited; Future    Other insomnia    Anxiety    Vitamin B12 deficiency    Other hyperlipidemia    Gastroesophageal reflux disease without esophagitis    Easy bruising  Comments:  CBC normal     Underweight    Other orders  -     cyanocobalamin (VITAMIN B-12) 500 MCG tablet; Take 500 mcg by mouth daily    Follow up in 6 months or as needed  Subjective:      Patient ID: Rut  is a 55 y o  female here for a physical     She reports difficulty falling asleep at night  She feels her heart and head are thumping at night, unable to fall asleep  She feels tired, is really frustrated  She starts crying  She is not interested in counseling or medication  She has seen sleep in the past, was diagnosed with sleep disorder  She does not have apnea   She also has tingling of her legs at night, would occasionally be bothersome  She wakes up at to 5 times at night to urinate, says she is able to empty her bladder  No day times symptoms  She saw rheumatology last year, said that she had some abnormal blood work  She was upset that they did not do more blood work to rule out things  She reports no joint pain or swelling, no rash  She also complains of a weird smell since she had COVID earlier this year  She smell body odor, no one else can smell it  She also complains of more frequent tingling of her right hand, by her pinky finger  She is wearing the wrist brace but feels it is not helping anymore  She feels her  has weakened  The following portions of the patient's history were reviewed and updated as appropriate: allergies, current medications, past medical history, past social history and problem list     Review of Systems   Constitutional: Negative for appetite change and fatigue  HENT: Negative for congestion, ear pain and postnasal drip  Eyes: Negative for visual disturbance  Respiratory: Negative for cough and shortness of breath  Cardiovascular: Negative for chest pain and leg swelling  Gastrointestinal: Negative for abdominal pain, constipation and diarrhea  Genitourinary: Negative for dysuria, frequency and urgency  Musculoskeletal: Negative for arthralgias and myalgias  Skin: Negative for rash and wound  Neurological: Negative for dizziness, numbness and headaches  Hematological: Does not bruise/bleed easily  Psychiatric/Behavioral: Negative for confusion  The patient is not nervous/anxious  Objective:      /70   Pulse 81   Temp (!) 97 °F (36 1 °C)   Ht 5' 1 75" (1 568 m)   Wt 44 5 kg (98 lb)   LMP 02/27/2021 (Approximate)   SpO2 99%   BMI 18 07 kg/m²          Physical Exam  Vitals and nursing note reviewed  Constitutional:       General: She is not in acute distress  Appearance: She is well-developed     HENT:      Head: Normocephalic and atraumatic  Right Ear: Tympanic membrane, ear canal and external ear normal       Left Ear: Tympanic membrane, ear canal and external ear normal    Eyes:      Pupils: Pupils are equal, round, and reactive to light  Cardiovascular:      Rate and Rhythm: Normal rate and regular rhythm  Heart sounds: Normal heart sounds  Pulmonary:      Effort: Pulmonary effort is normal       Breath sounds: Normal breath sounds  No wheezing  Abdominal:      General: Bowel sounds are normal       Palpations: Abdomen is soft  Musculoskeletal:         General: No swelling  Right lower leg: No edema  Left lower leg: No edema  Skin:     General: Skin is warm  Findings: No rash  Neurological:      General: No focal deficit present  Mental Status: She is alert and oriented to person, place, and time  Psychiatric:         Mood and Affect: Affect normal  Mood is anxious  Mood is not depressed  Speech: Speech normal          Behavior: Behavior normal            BMI Counseling: Body mass index is 18 07 kg/m²  The BMI is below normal  Patient was advised to gain weight

## 2022-08-10 ENCOUNTER — OFFICE VISIT (OUTPATIENT)
Dept: OBGYN CLINIC | Facility: CLINIC | Age: 57
End: 2022-08-10
Payer: COMMERCIAL

## 2022-08-10 VITALS — BODY MASS INDEX: 17.89 KG/M2 | HEART RATE: 93 BPM | OXYGEN SATURATION: 98 % | HEIGHT: 62 IN | WEIGHT: 97.2 LBS

## 2022-08-10 DIAGNOSIS — G56.21 ULNAR NEUROPATHY OF RIGHT UPPER EXTREMITY: ICD-10-CM

## 2022-08-10 PROCEDURE — 99203 OFFICE O/P NEW LOW 30 MIN: CPT | Performed by: PHYSICIAN ASSISTANT

## 2022-08-10 NOTE — PROGRESS NOTES
ASSESSMENT/PLAN:    Assessment:   Cubital Tunnel Syndrome  right    Plan:        Follow Up:  Next available with Dr Cordelia Matthews    To Do Next Visit:       General Discussions:     Cubital Tunnel Syndrome: The anatomy and physiology of cubital tunnel syndrome were discussed with the patient today in the office  Typically, increased elbow flexion activities decrease blood flow within the intraneural spaces, resulting in a feeling of numbness, tingling, weakness, or clumsiness within the hand and fingers  Occasionally, anatomic structures such as medial elbow osteophytes, the medial head of the triceps, were subluxing ulnar nerve may result in increased pressure or aggravation at the cubital tunnel  Typical signs and symptoms usually include numbness and tingling within the ring and small finger, weakness with , and weakness with pinch  Conservative treatment and includes nocturnal bracing to keep the elbow in a semi-extended position, activity modification, therapy, and avoiding excessive elbow flexion activities  A majority of patients typically respond to conservative treatment over a period of approximately 3-6 months  EMG/NCV testing of the ulnar nerve at the elbow is not as reliable as carpal tunnel syndrome  Surgical intervention in the form of in situ release of the ulnar nerve at the elbow or ulnar nerve transposition may be required in up to 20% of patients  Operative Discussions:           _____________________________________________________  CHIEF COMPLAINT:  Chief Complaint   Patient presents with    Right Wrist - Tingling         SUBJECTIVE:  Earl Luis is a 55 y o  female who presents with Pain  Mild  Intermittant  Burning to the right elbow  This started  3 year(s) ago as Insidious  Her symptoms are increasing and she is now having weakness when trying to open jars    Radiation: Yes to the  ring finger and small finger  Previous Treatments: bracing without relief  Associated symptoms: Tingling  Handedness: right  Work status: yoga    PAST MEDICAL HISTORY:  Past Medical History:   Diagnosis Date    Allergic     GERD (gastroesophageal reflux disease)     Lactose intolerance        PAST SURGICAL HISTORY:  Past Surgical History:   Procedure Laterality Date    BLADDER SURGERY      DILATION AND CURETTAGE OF UTERUS      HERNIA REPAIR      inguinal, left    MA COLONOSCOPY FLX DX W/COLLJ SPEC WHEN PFRMD N/A 9/12/2018    Procedure: EGD AND COLONOSCOPY;  Surgeon: Sina Choe MD;  [facility];   Service: Gastroenterology    TONSILLECTOMY         FAMILY HISTORY:  Family History   Problem Relation Age of Onset    Hyperlipidemia Mother     Hypertension Mother     Liver disease Mother     Pancreatic cancer Mother     Mental illness Mother     Colon cancer Father 79    Hypertension Brother     Alcohol abuse Brother     Mental illness Brother     Heart disease Maternal Grandmother     Dementia Maternal Grandmother     Heart disease Maternal Grandfather     Skin cancer Paternal Grandmother     Heart disease Maternal Uncle     Substance Abuse Neg Hx     Depression Neg Hx        SOCIAL HISTORY:  Social History     Tobacco Use    Smoking status: Former Smoker     Packs/day: 2 00     Years: 8 00     Pack years: 16 00     Types: Cigarettes    Smokeless tobacco: Never Used    Tobacco comment: quit age 22   Vaping Use    Vaping Use: Former   Substance Use Topics    Alcohol use: No    Drug use: Not Currently     Types: Marijuana       MEDICATIONS:    Current Outpatient Medications:     Calcium-Magnesium-Vitamin D (CALCIUM MAGNESIUM PO), one tablet by mouth daily (Patient not taking: Reported on 6/30/2022), Disp: , Rfl:     cyanocobalamin (VITAMIN B-12) 500 MCG tablet, Take 500 mcg by mouth daily, Disp: , Rfl:     Omega-3 1000 MG CAPS, Take by mouth, Disp: , Rfl:     ALLERGIES:  Allergies   Allergen Reactions    Sulfa Antibiotics Hives    Neomycin Hives    Penicillins Reaction Date: 12Nov2009;     Azithromycin Rash    Levaquin [Levofloxacin] Tachycardia       REVIEW OF SYSTEMS:  Pertinent items are noted in HPI  A comprehensive review of systems was negative  LABS:  HgA1c:   Lab Results   Component Value Date    HGBA1C 5 3 07/01/2022     BMP:   Lab Results   Component Value Date    CALCIUM 9 1 07/01/2022    K 3 8 07/01/2022    CO2 31 07/01/2022     07/01/2022    BUN 17 07/01/2022    CREATININE 0 88 07/01/2022         _____________________________________________________  PHYSICAL EXAMINATION:  Vital signs: Pulse 93   Ht 5' 1 75" (1 568 m)   Wt 44 1 kg (97 lb 3 2 oz)   LMP 02/27/2021 (Approximate)   SpO2 98%   BMI 17 92 kg/m²   General: well developed and well nourished, alert, oriented times 3 and appears comfortable  Psychiatric: Normal  HEENT: Trachea Midline, No torticollis  Cardiovascular: No discernable arrhythmia  Pulmonary: No wheezing or stridor  Abdomen: No guarding  Extremities: No peripheral edema  Skin: No masses, erythema, lacerations, fluctation, ulcerations  Neurovascular:  Motor Intact to the Median, Ulnar, Radial Nerve and Pulses Intact    MUSCULOSKELETAL EXAMINATION:  RIGHT SIDE:  Cubital Tunnel:  No weakness in finger abduction, No ulnar nerve subluxation, No atrophy, Negative clawing and + Tinel's to the cubital tunnel    _____________________________________________________  STUDIES REVIEWED:  No Studies to review      PROCEDURES PERFORMED:  Procedures  No Procedures performed today

## 2022-08-18 ENCOUNTER — TELEPHONE (OUTPATIENT)
Dept: INTERNAL MEDICINE CLINIC | Facility: CLINIC | Age: 57
End: 2022-08-18

## 2022-08-18 DIAGNOSIS — F41.9 ANXIETY: ICD-10-CM

## 2022-08-18 DIAGNOSIS — E61.0 COPPER DEFICIENCY: Primary | ICD-10-CM

## 2022-08-18 DIAGNOSIS — R76.8 POSITIVE ANA (ANTINUCLEAR ANTIBODY): ICD-10-CM

## 2022-08-18 DIAGNOSIS — R63.6 UNDERWEIGHT: ICD-10-CM

## 2022-08-18 NOTE — TELEPHONE ENCOUNTER
Pt  wants to know if she can be tested for Wilsons disease  She had been tested before-copper level- and it showed she has low copper, but she has been researching her symptoms online and they are all symptoms of Wilsons disease  She has a high MARYELLEN, stomach problems-dairrhea for 7 days last week, and vomiting and other symptoms she's been complaining about to Dr Shakira Joe about

## 2022-08-18 NOTE — TELEPHONE ENCOUNTER
Lab ordered  Please do abdominal ultrasound which I had ordered  You can also see your eye doctor and express your concerns about Fede disease

## 2022-08-24 ENCOUNTER — TELEPHONE (OUTPATIENT)
Dept: INTERNAL MEDICINE CLINIC | Facility: CLINIC | Age: 57
End: 2022-08-24

## 2022-08-24 DIAGNOSIS — E61.0 COPPER DEFICIENCY: Primary | ICD-10-CM

## 2022-08-24 LAB — CERULOPLASMIN SERPL-MCNC: 31 MG/DL (ref 18–53)

## 2022-08-24 NOTE — TELEPHONE ENCOUNTER
Pt  Chris Fernandez she wants to have a copper,24 hr  Total volume urine test done to test for Wilsons disease  Pt  Is on her way to [facility] right now

## 2022-08-24 NOTE — TELEPHONE ENCOUNTER
Ordered, as requested  Do you have an appt with your eye doctor? You can also see neurology again, you had seen them before regarding your copper deficiency

## 2022-08-27 LAB
APPEARANCE UR: ABNORMAL
BACTERIA UR QL AUTO: ABNORMAL /HPF
BILIRUB UR QL STRIP: NEGATIVE
COLOR UR: ABNORMAL
GLUCOSE UR QL STRIP: NEGATIVE
HGB UR QL STRIP: ABNORMAL
HYALINE CASTS #/AREA URNS LPF: ABNORMAL /LPF
KETONES UR QL STRIP: ABNORMAL
LEUKOCYTE ESTERASE UR QL STRIP: ABNORMAL
NITRITE UR QL STRIP: NEGATIVE
PH UR STRIP: 5.5 [PH] (ref 5–8)
PROT UR QL STRIP: ABNORMAL
RBC #/AREA URNS HPF: ABNORMAL /HPF
SP GR UR STRIP: 1.02 (ref 1–1.03)
SQUAMOUS #/AREA URNS HPF: ABNORMAL /HPF
WBC #/AREA URNS HPF: ABNORMAL /HPF

## 2022-08-28 ENCOUNTER — TELEPHONE (OUTPATIENT)
Dept: INTERNAL MEDICINE CLINIC | Facility: CLINIC | Age: 57
End: 2022-08-28

## 2022-08-28 DIAGNOSIS — E61.0 COPPER DEFICIENCY: ICD-10-CM

## 2022-08-28 DIAGNOSIS — G60.9 IDIOPATHIC PERIPHERAL NEUROPATHY: ICD-10-CM

## 2022-08-28 DIAGNOSIS — R39.9 URINARY SYMPTOM OR SIGN: Primary | ICD-10-CM

## 2022-08-28 NOTE — TELEPHONE ENCOUNTER
Urine showed possible infection  If you have any symptoms, we can start antibiotics, let me know  ceruloplasmin was normal  Are you doing the 24 hour urine? No results for copper levels

## 2022-08-29 LAB
COPPER 24H UR-MRATE: <5 MCG/24 H (ref 15–60)
SPECIMEN VOL 24H UR: 1750 ML

## 2022-08-29 RX ORDER — NITROFURANTOIN 25; 75 MG/1; MG/1
100 CAPSULE ORAL 2 TIMES DAILY
Qty: 10 CAPSULE | Refills: 0 | Status: SHIPPED | OUTPATIENT
Start: 2022-08-29 | End: 2022-09-03

## 2022-08-30 NOTE — TELEPHONE ENCOUNTER
You need to see the eye doctor asap if you are concerned about Fede's disease  Please schedule an appointment with neurology also  We can recheck your urine next month since you were recently treatment for an infection    I have UA from 2017 which was normal

## 2022-08-30 NOTE — TELEPHONE ENCOUNTER
Please give info for eye doctors  Neurology referral sent, new patient appointment may take months   I suggest going to your previous neurologist

## 2022-08-30 NOTE — TELEPHONE ENCOUNTER
Ceruloplasmin levels normal, 24 hour urine copper is a bit low  Have you been to your eye doctor to be evaluated for Fede's disease? Also, do you have an appointment with neurology?

## 2022-08-30 NOTE — TELEPHONE ENCOUNTER
Has no upcoming appointment was waiting on these results   Should she scheduled with Eye doctor and Neurology ?     Also states going through old records and presents and notices whenever she gets vertigo she has blood in her urine and doesn't know if it is related

## 2022-08-31 ENCOUNTER — OFFICE VISIT (OUTPATIENT)
Dept: OBGYN CLINIC | Facility: CLINIC | Age: 57
End: 2022-08-31
Payer: COMMERCIAL

## 2022-08-31 ENCOUNTER — HOSPITAL ENCOUNTER (OUTPATIENT)
Dept: RADIOLOGY | Facility: HOSPITAL | Age: 57
Discharge: HOME | End: 2022-08-31
Attending: ORTHOPAEDIC SURGERY
Payer: COMMERCIAL

## 2022-08-31 DIAGNOSIS — M54.12 RADICULOPATHY, CERVICAL REGION: ICD-10-CM

## 2022-08-31 DIAGNOSIS — G56.21 ULNAR NEUROPATHY OF RIGHT UPPER EXTREMITY: ICD-10-CM

## 2022-08-31 DIAGNOSIS — G56.21 ULNAR NEUROPATHY OF RIGHT UPPER EXTREMITY: Primary | ICD-10-CM

## 2022-08-31 PROCEDURE — 99214 OFFICE O/P EST MOD 30 MIN: CPT | Performed by: ORTHOPAEDIC SURGERY

## 2022-08-31 PROCEDURE — 73130 X-RAY EXAM OF HAND: CPT

## 2022-08-31 PROCEDURE — 73110 X-RAY EXAM OF WRIST: CPT

## 2022-08-31 NOTE — PROGRESS NOTES
[address] [facility] [address]  [address] [facility]  [id number]  1965    ORTHOPAEDIC SURGERY OUTPATIENT NOTE  8/31/2022      HISTORY:  55 y o  female who presents to the office today for an initial evalaution of her right ring and small finger numbness and tingling  She was referred to me by Matt Schmid PA-C  She states that she will experience intermittent numbness and tingling into her right ring and pinky fingers  She states that the numbness and tingling is more positional especially when sleeping  Past Medical History:   Diagnosis Date    Allergic     GERD (gastroesophageal reflux disease)     Lactose intolerance        Past Surgical History:   Procedure Laterality Date    BLADDER SURGERY      DILATION AND CURETTAGE OF UTERUS      HERNIA REPAIR      inguinal, left    CT COLONOSCOPY FLX DX W/COLLJ SPEC WHEN PFRMD N/A 9/12/2018    Procedure: EGD AND COLONOSCOPY;  Surgeon: Lilian Hernández MD;  [facility];   Service: Gastroenterology    TONSILLECTOMY         Social History     Socioeconomic History    Marital status: /Civil Union     Spouse name: Not on file    Number of children: Not on file    Years of education: Not on file    Highest education level: Not on file   Occupational History    Not on file   Tobacco Use    Smoking status: Former Smoker     Packs/day: 2 00     Years: 8 00     Pack years: 16 00     Types: Cigarettes    Smokeless tobacco: Never Used    Tobacco comment: quit age 22   Vaping Use    Vaping Use: Former   Substance and Sexual Activity    Alcohol use: No    Drug use: Not Currently     Types: Marijuana    Sexual activity: Yes     Partners: Male     Birth control/protection: Condom Male, Post-menopausal   Other Topics Concern    Not on file   Social History Narrative    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         Social Determinants of Health     Financial Resource Strain: Not on file   Food Insecurity: Not on file   Transportation Needs: Not on file   Physical Activity: Not on file   Stress: Not on file   Social Connections: Not on file   Intimate Partner Violence: Not on file   Housing Stability: Not on file       Family History   Problem Relation Age of Onset    Hyperlipidemia Mother     Hypertension Mother    [facility] Liver disease Mother     Pancreatic cancer Mother     Mental illness Mother     Colon cancer Father 79    Hypertension Brother     Alcohol abuse Brother     Mental illness Brother     Heart disease Maternal Grandmother     Dementia Maternal Grandmother     Heart disease Maternal Grandfather     Skin cancer Paternal Grandmother     Heart disease Maternal Uncle     Substance Abuse Neg Hx     Depression Neg Hx         Patient's Medications   New Prescriptions    No medications on file   Previous Medications    CALCIUM-MAGNESIUM-VITAMIN D (CALCIUM MAGNESIUM PO)        CYANOCOBALAMIN (VITAMIN B-12) 500 MCG TABLET    Take 500 mcg by mouth daily    NITROFURANTOIN (MACROBID) 100 MG CAPSULE    Take 1 capsule (100 mg total) by mouth 2 (two) times a day for 5 days    OMEGA-3 1000 MG CAPS    Take by mouth   Modified Medications    No medications on file   Discontinued Medications    No medications on file       Allergies   Allergen Reactions    Sulfa Antibiotics Hives    Neomycin Hives    Penicillins      Reaction Date: 12Nov2009;     Azithromycin Rash    Levaquin [Levofloxacin] Tachycardia        LMP 02/27/2021 (Approximate)      REVIEW OF SYSTEMS:  Constitutional: Negative  HEENT: Negative  Respiratory: Negative  Skin: Negative  Neurological: Negative  Psychiatric/Behavioral: Negative  Musculoskeletal: Negative except for that mentioned in the HPI      LMP 02/27/2021 (Approximate)   Gen: Alert and oriented to person, place, time  HEENT: EOMI, eyes clear, moist mucus membranes, hearing intact  Respiratory: Bilateral chest rise  No audible wheezing found  Cardiovascular: Regular Rate and Rhythm  Abdomen: soft nontender/nondistended     PHYSICAL EXAM:  RIGHT ELBOW:  Flexion: 140 degrees  Extension: 0 degrees  Pronation: 80 degrees  Supination: 80 degrees    TTP Lateral Epicondyle:  Negative  TTP Medial Epicondyle:  Negative  TTP Olecranon: negative  TTP Radial Head: negative  TTP Biceps Tendon:  Negative    Strength:  Flexion: 5/5  Extension: 5/5  Pronation: 5/5  Supination: 5/5    Pain with resisted wrist extension:  Negative  Pain with resisted 3rd finger extension:  Negative  Pain with resisted wrist flexion:  Negative    Varus laxity:  Negative  Valgus laxity:  Negative  Milking maneuver:  Negative  Moving valgus stress test:  Negative    Cubital tunnel Tinel's:  Negative    Radial/median/ulnar nerve intact    <2 sec cap refill    RIGHT HAND:    Durkan's test: Negative  Tinel's test: Negative  Phalen's test: Negative  Median Nerve: Normal  Ulnar Nerve: Normal  Thenar Muscle atrophy: Negative  Hypothenar Muscle atrophy: Negative    Cervical Spine:  Positive Spurling's to right     IMAGING: X-ray's performed in the office today of her right hand demonstrates no acute fractures, dislocations lytic or blastic lesions  X-ray's performed in the office today of her right wrist demonstrates no acute fractures, dislocations lytic or blastic lesions  ASSESSMENT AND PLAN:  55 y o  female right ulnar neuropathy and cervical radiculopathy    She was referred to me by Maria A Choudhary PA-C  X-rays were reviewed with the patient at today's visit  I discussed with the patient this time, I recommend an EMG of her right upper extremity for further evaluation of her cervical radiculopathy versus ulnar neuropathy  An EMG of her right upper extremity was ordered at today's visit  I will follow-up with her once the EMG is completed  She understood and had no further questions      Scribe Attestation    I,:  Pierre Jaquez am acting as a scribe while in the presence of the attending physician :       I,:  Tierney Abdullahi personally performed the services described in this documentation    as scribed in my presence :

## 2022-08-31 NOTE — TELEPHONE ENCOUNTER
Spoke w/ pt  And adv'd to see current Neurologist and gave hher phone #'s for [facility] and  [address] [place]

## 2022-09-21 ENCOUNTER — TELEPHONE (OUTPATIENT)
Dept: OTHER | Facility: OTHER | Age: 57
End: 2022-09-21

## 2022-09-21 NOTE — TELEPHONE ENCOUNTER
Patient is requesting copies of her records for all her office visits to be sent to her if possible  Please call her back to confirm

## 2022-09-22 ENCOUNTER — TELEPHONE (OUTPATIENT)
Dept: NEUROLOGY | Facility: CLINIC | Age: 57
End: 2022-09-22

## 2022-09-22 NOTE — TELEPHONE ENCOUNTER
Patient stopped in the [place] office and signed her [facility] to obtain a copy of her office visits with Dr Bhavik Alegria  Records given to patient

## 2022-09-22 NOTE — TELEPHONE ENCOUNTER
I called and spoke with the patient  She will stop at our [facility] office today or tomorrow and sign a medical records release to obtain her 2 visits from 2016

## 2022-11-15 ENCOUNTER — TELEPHONE (OUTPATIENT)
Dept: OBGYN CLINIC | Facility: HOSPITAL | Age: 57
End: 2022-11-15

## 2022-11-15 NOTE — TELEPHONE ENCOUNTER
Caller: self    Doctor: Andrews Kruger    Reason for call: patient l/m on vm regarding EMG scheduling  Called patient back and advised of EMG date      Call back#: [phone]

## 2022-12-02 ENCOUNTER — TELEPHONE (OUTPATIENT)
Dept: INTERNAL MEDICINE CLINIC | Facility: CLINIC | Age: 57
End: 2022-12-02

## 2022-12-02 DIAGNOSIS — K90.9 MALABSORPTION SYNDROME: ICD-10-CM

## 2022-12-02 DIAGNOSIS — R63.6 UNDERWEIGHT: Primary | ICD-10-CM

## 2022-12-02 NOTE — TELEPHONE ENCOUNTER
Referral sent, I am not sure if available in the network  You can also check with your insurance to see if and who is covered

## 2022-12-06 LAB — COPPER SERPL-MCNC: 109 MCG/DL (ref 70–175)

## 2022-12-08 ENCOUNTER — TELEPHONE (OUTPATIENT)
Dept: INTERNAL MEDICINE CLINIC | Facility: CLINIC | Age: 57
End: 2022-12-08

## 2022-12-08 NOTE — TELEPHONE ENCOUNTER
Staff: please call to say that her BMI is currently 17 92 and she would benefit to see a nutritionist

## 2022-12-08 NOTE — TELEPHONE ENCOUNTER
I spoke w/ Dionne  She said we will need to send ov's and any labs or other testing pertaining to diagnosis, letter of medical necessity  Fax to [phone]  Her ins  Will deny her visit, so they will have to appeal it and would like this documentation for when they appeal it

## 2022-12-08 NOTE — TELEPHONE ENCOUNTER
Pt 's insurance said our office needs to call them at [phone] to [facility], to tell them that it is a medical necessity that pt  see a Nutritionist

## 2022-12-08 NOTE — TELEPHONE ENCOUNTER
We need to fax any labs or testing she had pertaining to her recent wt  loss as well as ov's pertaining to this  Please make sure diagnosis code and CPT code if available is on the letter of medical necessity and sophie as urgent  Include the case No  [id number]  Address fax to [phone]

## 2022-12-08 NOTE — TELEPHONE ENCOUNTER
Staff:   Please fax labs from 7/1  Please start letter   Dx codes R63 6 with BMP of 83 21, Q41 9, Z40 4, Y99 3

## 2023-01-05 ENCOUNTER — OFFICE VISIT (OUTPATIENT)
Dept: OBGYN CLINIC | Facility: CLINIC | Age: 58
End: 2023-01-05

## 2023-01-05 VITALS
HEIGHT: 61 IN | WEIGHT: 89 LBS | BODY MASS INDEX: 16.8 KG/M2 | DIASTOLIC BLOOD PRESSURE: 74 MMHG | SYSTOLIC BLOOD PRESSURE: 102 MMHG

## 2023-01-05 DIAGNOSIS — R63.4 WEIGHT LOSS: ICD-10-CM

## 2023-01-05 DIAGNOSIS — N63.21 MASS OF UPPER OUTER QUADRANT OF LEFT BREAST: Primary | ICD-10-CM

## 2023-01-05 DIAGNOSIS — R30.0 DYSURIA: ICD-10-CM

## 2023-01-05 RX ORDER — ZINC GLUCONATE 50 MG
50 TABLET ORAL DAILY
COMMUNITY

## 2023-01-05 NOTE — PROGRESS NOTES
Assessment Boone Underwood was seen today for breast mass  Diagnoses and all orders for this visit:    Mass of upper outer quadrant of left breast  -     US breast left limited (diagnostic); Future    Dysuria  -     Urinalysis with microscopic; Future    Weight loss  -     TSH, 3rd generation with Free T4 reflex; Future         Plan   Labs/imaging as above  Will follow up with patient regarding results and next steps  Antonio Bright is a 65 y o  female here for a problem visit  Patient is complaining of breast lump and tenderness  Has a history of breast cysts but recently noticed worsening symptoms including pain and increased size  Is very concerned because she has been losing weight with no explanation  This cyst she noticed a few days ago  It is on the left  She also thinks there is a nonpainful pea-sized lump on the right  Regarding her weight loss she is following with nutrition who is helping with supplemental protein and other nutrient management  She is very, very concerned about her weight  Lastly she has dysuria which she thinks is due to some of the supplements that she is on  She had a urinalysis done about a month ago by another physician but no culture  She would like to stop her supplements and see if her dysuria improves; if not she would like to repeat a urinalysis       Patient Active Problem List   Diagnosis   • Copper deficiency   • Dystonia   • Endometriosis   • Idiopathic peripheral neuropathy   • Malabsorption syndrome   • Mild vitamin D deficiency   • TACS (trigeminal autonomic cephalgias)   • Small fiber neuropathy   • Other hyperlipidemia   • Abnormal fasting glucose   • Constipation   • Coccygeal pain   • Costochondritis   • Seasonal allergic rhinitis due to pollen   • Lead poisoning   • GERD (gastroesophageal reflux disease)   • Positive MARYELLEN (antinuclear antibody)   • Anxiety   • Liver cyst   • Vitamin B12 deficiency   • Ulnar neuropathy of right upper extremity • Other insomnia   • Underweight   • Hearing loss         Gynecologic History  Patient's last menstrual period was 2021 (approximate)  Obstetric History  OB History    Para Term  AB Living   3 1 1   2 1   SAB IAB Ectopic Multiple Live Births   2       1      # Outcome Date GA Lbr Lam/2nd Weight Sex Delivery Anes PTL Lv   3 Term 02    M Vag-Spont   NKECHI   2 SAB            1 SAB                Past Medical/Surgical/Family/Social History  The following portions of the patient's history were reviewed and updated as appropriate: allergies, current medications, past family history, past medical history, past social history, past surgical history and problem list     Allergies  Sulfa antibiotics, Neomycin, Penicillins, Azithromycin, and Levaquin [levofloxacin]    Medications    Current Outpatient Medications:   •  Calcium-Magnesium-Vitamin D (CALCIUM MAGNESIUM PO), , Disp: , Rfl:   •  cyanocobalamin (VITAMIN B-12) 500 MCG tablet, Take 500 mcg by mouth daily, Disp: , Rfl:   •  D-MANNOSE PO, Take by mouth, Disp: , Rfl:   •  MAGNESIUM GLYCINATE PO, Take by mouth, Disp: , Rfl:   •  NON FORMULARY, Cortisol Calm, Disp: , Rfl:   •  NON FORMULARY, Femdophilus, Disp: , Rfl:   •  Omega-3 1000 MG CAPS, Take by mouth, Disp: , Rfl:   •  Probiotic Product (TurtleCell PO), Take by mouth, Disp: , Rfl:   •  zinc gluconate 50 mg tablet, Take 50 mg by mouth daily, Disp: , Rfl:       Review of Systems  Constitutional: no fever, feels well, positive for weight loss  Breasts: see HPI  Gastrointestinal: no complaints of abdominal pain, constipation, nausea  : see HPI         Objective     /74 (BP Location: Right arm, Patient Position: Sitting, Cuff Size: Standard)   Ht 5' 1" (1 549 m)   Wt 40 4 kg (89 lb)   LMP 2021 (Approximate)   BMI 16 82 kg/m²     GEN: The patient was alert and oriented x3, pleasant well-appearing female in no acute distress     CV: Regular rate  PULM: nonlabored respirations  BREAST: The breasts appear symmetric bilaterally  On the right, there are no palpable masses, lesions or rashes  The area of concern felt by the patient feels like a rib on my exam  On the left, there is a small, < 1 cm mass on the outer quadrant approx 2cm from the nipple and at 9:00  There are no other masses or lesions on the left  There is no axillary lymphadenopathy bilaterally     MSK: Normal gait  Skin: warm, dry  Neuro: no focal deficits  Psych: normal affect and judgement, cooperative

## 2023-01-09 LAB
APPEARANCE UR: CLEAR
BACTERIA UR QL AUTO: ABNORMAL /HPF
BILIRUB UR QL STRIP: NEGATIVE
COLOR UR: YELLOW
GLUCOSE UR QL STRIP: NEGATIVE
HGB UR QL STRIP: ABNORMAL
HYALINE CASTS #/AREA URNS LPF: ABNORMAL /LPF
KETONES UR QL STRIP: ABNORMAL
LEUKOCYTE ESTERASE UR QL STRIP: NEGATIVE
NITRITE UR QL STRIP: NEGATIVE
PH UR STRIP: 7 [PH] (ref 5–8)
PROT UR QL STRIP: NEGATIVE
RBC #/AREA URNS HPF: ABNORMAL /HPF
SP GR UR STRIP: 1.01 (ref 1–1.03)
SQUAMOUS #/AREA URNS HPF: ABNORMAL /HPF
TSH SERPL-ACNC: 1.82 MIU/L (ref 0.4–4.5)
WBC #/AREA URNS HPF: ABNORMAL /HPF

## 2023-01-16 ENCOUNTER — OFFICE VISIT (OUTPATIENT)
Dept: FAMILY MEDICINE CLINIC | Facility: CLINIC | Age: 58
End: 2023-01-16

## 2023-01-16 VITALS
SYSTOLIC BLOOD PRESSURE: 120 MMHG | OXYGEN SATURATION: 98 % | WEIGHT: 90.8 LBS | HEIGHT: 62 IN | HEART RATE: 95 BPM | TEMPERATURE: 98.3 F | BODY MASS INDEX: 16.71 KG/M2 | DIASTOLIC BLOOD PRESSURE: 92 MMHG | RESPIRATION RATE: 14 BRPM

## 2023-01-16 DIAGNOSIS — R63.6 UNDERWEIGHT: ICD-10-CM

## 2023-01-16 DIAGNOSIS — Z12.11 SCREENING FOR COLON CANCER: ICD-10-CM

## 2023-01-16 DIAGNOSIS — E53.8 VITAMIN B12 DEFICIENCY: ICD-10-CM

## 2023-01-16 DIAGNOSIS — Z13.1 SCREENING FOR DIABETES MELLITUS: ICD-10-CM

## 2023-01-16 DIAGNOSIS — G89.29 CHRONIC BILATERAL LOW BACK PAIN WITHOUT SCIATICA: ICD-10-CM

## 2023-01-16 DIAGNOSIS — M54.50 CHRONIC BILATERAL LOW BACK PAIN WITHOUT SCIATICA: ICD-10-CM

## 2023-01-16 DIAGNOSIS — R63.4 WEIGHT LOSS: Primary | ICD-10-CM

## 2023-01-16 DIAGNOSIS — E78.49 OTHER HYPERLIPIDEMIA: ICD-10-CM

## 2023-01-16 DIAGNOSIS — R31.9 HEMATURIA, UNSPECIFIED TYPE: ICD-10-CM

## 2023-01-16 DIAGNOSIS — Z80.0 FAMILY HISTORY OF COLON CANCER: ICD-10-CM

## 2023-01-16 DIAGNOSIS — E55.9 MILD VITAMIN D DEFICIENCY: ICD-10-CM

## 2023-01-16 NOTE — PROGRESS NOTES
FAMILY PRACTICE OFFICE VISIT       NAME: Cory Stanton  AGE: 65 y o  SEX: female       : 1965        MRN: [id number]    DATE: 2023  TIME: 7:46 PM    Assessment and Plan   1  Weight loss  Comments:  Pt stable on exam - unclear etiology  Recent TSH normal; UTD with GYN exam, mammo  Seeing [facility] - strongly recommended she do proposed testing  Orders:  -     CBC and differential; Future  -     Ambulatory Referral to Gastroenterology; Future    2  Underweight  Comments:  As above  Pt to supplement meals with OTC Boost, etc  Seeing Nutritionist  Pt could not decide if she wanted to continue with [facility] - also referred to [address] [place] [address]  Orders:  -     Ambulatory Referral to Gastroenterology; Future    3  Chronic bilateral low back pain without sciatica  Comments:  Pt stable on exam   Will get xrays at this time  Orders:  -     XR spine lumbar 2 or 3 views injury; Future; Expected date: 2023    4  Other hyperlipidemia  Comments:  Hx of from records - checking FBW with Lipid Panel  Orders:  -     Lipid Panel with Direct LDL reflex; Future    5  Mild vitamin D deficiency  Comments:  Hx of - reassuring Vit D level within last year; continue OTC Vit D3 supplement  6  Vitamin B12 deficiency  Comments:  Hx of - reassuring Vit B12 level within last year; continue OTC Vit B12 PO supplement  7  Screening for colon cancer  Comments:  Liliana given complaints above, famhx, & timing (last colonoscopy in ), proposed testing that she has planned (CT / EGD / Colonoscopy) strongly recommended  Orders:  -     Ambulatory Referral to Gastroenterology; Future    8  Family history of colon cancer  -     Ambulatory Referral to Gastroenterology; Future    9  Screening for diabetes mellitus  -     Comprehensive metabolic panel; Future    10  Hematuria, unspecified type  Comments:  Hx of - check renal US  Pending results, may possibly need to refer to Urology    Orders:  -     US kidney and bladder with pvr; Future; Expected date: 01/16/2023         There are no Patient Instructions on file for this visit  Chief Complaint     Chief Complaint   Patient presents with   • Establish Care     Patient being seen to establish care    • Back Pain     Patient being seen for lower right side back/rib pain ongoing,  intermittent   • Weight Loss     Patient being seen for unexplained weight loss x 7 months        History of Present Illness   Orlin Kendrick is a 65y o -year-old female who presents with her  as a New Patient - reporting unexplained weight loss, mainly over the last 6 - 7 months  No clear medication changes  Stopped milk intake over the last year  Made other dietary changes, and actually feeling better  Did see [facility] in 12/2022 - they have had her have stool testing  They are recommending colonoscopy / EGD / CT Scan - pt is very nervous about doing this though  I discussed this with the pt and  multuple times, that I felt that they were given appropriate advise, and tried to adequately answer the multiple questions / concerns that they had  I recommended that the testing was done sooner as opposed to later, given her weight loss, and family hx (colon cancer, pancreatic cancer, MEN 1)  Recent fecal fat, LFTs, Lipase, TSH were normal   Had recent cysts on breast cancer screening with OB/GYN - actually got smaller  Repeating mammogram in a year  She is not witholding food, binging/purging, etc   Has a Nutritionist as well - they are working on supplementing her meals  Review of Systems   Review of Systems   Constitutional: Positive for unexpected weight change  Negative for activity change  Respiratory: Negative for shortness of breath  Chronic, mild VILLARREAL  Cardiovascular: Negative for chest pain  Gastrointestinal: Positive for abdominal pain  Negative for blood in stool  More epigastric pain in nature  Genitourinary: Negative for dysuria and hematuria          Hx of +urine testing for blood previously; no visible hematuria  Musculoskeletal: Positive for back pain  Active Problem List     Patient Active Problem List   Diagnosis   • Copper deficiency   • Dystonia   • Endometriosis   • Idiopathic peripheral neuropathy   • Malabsorption syndrome   • Mild vitamin D deficiency   • TACS (trigeminal autonomic cephalgias)   • Small fiber neuropathy   • Other hyperlipidemia   • Abnormal fasting glucose   • Constipation   • Coccygeal pain   • Costochondritis   • Seasonal allergic rhinitis due to pollen   • Lead poisoning   • GERD (gastroesophageal reflux disease)   • Positive MARYELLEN (antinuclear antibody)   • Anxiety   • Liver cyst   • Vitamin B12 deficiency   • Ulnar neuropathy of right upper extremity   • Other insomnia   • Underweight   • Hearing loss         Past Medical History:  Past Medical History:   Diagnosis Date   • Allergic    • GERD (gastroesophageal reflux disease)    • Lactose intolerance    • Urinary tract infection comes and goes blood in urine   • Visual impairment posterior vitreous detachment    2020       Past Surgical History:  Past Surgical History:   Procedure Laterality Date   • BLADDER SURGERY     • DILATION AND CURETTAGE OF UTERUS      Miscarriage x2   • HERNIA REPAIR      inguinal, left   • MN COLONOSCOPY FLX DX W/COLLJ SPEC WHEN PFRMD N/A 09/12/2018    Procedure: EGD AND COLONOSCOPY;  Surgeon: Chip Powell MD;  [facility];   Service: Gastroenterology   • TONSILLECTOMY         Family History:  Family History   Problem Relation Age of Onset   • Hyperlipidemia Mother    • Hypertension Mother    • Liver disease Mother    • Pancreatic cancer Mother    • Mental illness Mother    • Cancer Mother         Pancreas & MEN-1   • Colon cancer Father    • Hypertension Brother    • Alcohol abuse Brother    • Mental illness Brother    • ADD / ADHD Brother    • OCD Brother    • Heart disease Maternal Grandmother    • Dementia Maternal Grandmother    • Arthritis Maternal Grandmother    • Heart disease Maternal Grandfather    • Skin cancer Paternal Grandmother    • Breast cancer Paternal Grandmother         skin,bone & breast   • Kidney disease Paternal Grandfather    • Heart disease Maternal Uncle    • Diabetes Maternal Uncle    • Autoimmune disease Paternal Aunt         MS   • Autoimmune disease Paternal Aunt         Lupus   • Substance Abuse Neg Hx    • Depression Neg Hx        Social History:  Social History     Socioeconomic History   • Marital status: /Civil Union     Spouse name: Not on file   • Number of children: Not on file   • Years of education: Not on file   • Highest education level: Not on file   Occupational History   • Not on file   Tobacco Use   • Smoking status: Former     Packs/day: 1 00     Years: 8 00     Pack years: 8 00     Types: Cigarettes     Start date: 1980     Quit date: 1987     Years since quittin 0   • Smokeless tobacco: Never   • Tobacco comments:     off and on not constant   Vaping Use   • Vaping Use: Former   Substance and Sexual Activity   • Alcohol use: No   • Drug use: Never     Types: Marijuana   • Sexual activity: Yes     Partners: Male     Birth control/protection: Condom Male, Post-menopausal   Other Topics Concern   • Not on file   Social History Narrative    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         Social Determinants of Health     Financial Resource Strain: Not on file   Food Insecurity: Not on file   Transportation Needs: Not on file   Physical Activity: Not on file   Stress: Not on file   Social Connections: Not on file   Intimate Partner Violence: Not on file   Housing Stability: Not on file       Objective     Vitals:    23 1815   BP: 120/92   Pulse: 95   Resp: 14   Temp: 98 3 °F (36 8 °C)   SpO2: 98%     Wt Readings from Last 3 Encounters:   23 41 2 kg (90 lb 12 8 oz)   23 40 4 kg (89 lb)   08/10/22 44 1 kg (97 lb 3 2 oz)       Physical Exam  Vitals and nursing note reviewed  Constitutional:       General: She is not in acute distress  Appearance: Normal appearance  She is underweight  She is not ill-appearing, toxic-appearing or diaphoretic  HENT:      Head: Normocephalic and atraumatic  Right Ear: Tympanic membrane, ear canal and external ear normal       Left Ear: Tympanic membrane, ear canal and external ear normal    Eyes:      General: No scleral icterus  Conjunctiva/sclera: Conjunctivae normal    Cardiovascular:      Rate and Rhythm: Normal rate and regular rhythm  Heart sounds: Normal heart sounds  No murmur heard  No friction rub  No gallop  Pulmonary:      Effort: Pulmonary effort is normal  No respiratory distress  Breath sounds: Normal breath sounds  No stridor  No wheezing, rhonchi or rales  Abdominal:      General: Abdomen is flat  Bowel sounds are normal  There is no distension  Palpations: Abdomen is soft  There is no mass  Tenderness: There is no abdominal tenderness  There is no guarding or rebound  Musculoskeletal:      Cervical back: Normal range of motion and neck supple  No rigidity or tenderness  Back:    Lymphadenopathy:      Cervical: No cervical adenopathy  Neurological:      Mental Status: She is alert and oriented to person, place, and time  Psychiatric:         Mood and Affect: Mood is anxious  Affect is not inappropriate  Speech: Speech is rapid and pressured  Behavior: Behavior normal          Thought Content:  Thought content normal          Judgment: Judgment normal          Pertinent Laboratory/Diagnostic Studies:  Lab Results   Component Value Date    BUN 17 07/01/2022    CREATININE 0 88 07/01/2022    CALCIUM 9 1 07/01/2022    K 3 8 07/01/2022    CO2 31 07/01/2022     07/01/2022     Lab Results   Component Value Date    ALT 13 07/01/2022    AST 17 07/01/2022    ALKPHOS 75 07/01/2022       Lab Results   Component Value Date    WBC 4 8 07/01/2022    HGB 14 0 07/01/2022    HCT 41 4 07/01/2022    MCV 90 8 07/01/2022     07/01/2022       No results found for: TSH    No results found for: CHOL  Lab Results   Component Value Date    TRIG 62 07/01/2022     Lab Results   Component Value Date    HDL 72 07/01/2022     Lab Results   Component Value Date    LDLCALC 120 (H) 07/01/2022     Lab Results   Component Value Date    HGBA1C 5 3 07/01/2022       Results for orders placed or performed in visit on 01/09/23   Urinalysis with microscopic   Result Value Ref Range    Color UA YELLOW YELLOW    Urine Appearance CLEAR CLEAR    Specific Gravity 1 006 1 001 - 1 035    Ph 7 0 5 0 - 8 0    Glucose, Urine NEGATIVE NEGATIVE    Bilirubin, Urine NEGATIVE NEGATIVE    Ketone, Urine TRACE (A) NEGATIVE    Blood, Urine 1+ (A) NEGATIVE    Protein, Urine NEGATIVE NEGATIVE    SL AMB NITRITES URINE, QUAL   NEGATIVE NEGATIVE    Leukocyte Esterase NEGATIVE NEGATIVE    SL AMB WBC, URINE NONE SEEN < OR = 5 /HPF    RBC, Urine NONE SEEN < OR = 2 /HPF    Squamous Epithelial Cells NONE SEEN < OR = 5 /HPF    Bacteria, UA NONE SEEN NONE SEEN /HPF    Hyaline Casts NONE SEEN NONE SEEN /LPF    Comment(s)     TSH, 3rd generation with Free T4 reflex   Result Value Ref Range    TSH W/RFX TO FREE T4 1 82 0 40 - 4 50 mIU/L       Orders Placed This Encounter   Procedures   • XR spine lumbar 2 or 3 views injury   • US kidney and bladder with pvr   • CBC and differential   • Comprehensive metabolic panel   • Lipid Panel with Direct LDL reflex   • Ambulatory Referral to Gastroenterology       ALLERGIES:  Allergies   Allergen Reactions   • Sulfa Antibiotics Hives   • Neomycin Hives   • Penicillins      Reaction Date: 12Nov2009;    • Azithromycin Rash   • Levaquin [Levofloxacin] Tachycardia       Current Medications     Current Outpatient Medications   Medication Sig Dispense Refill   • D-MANNOSE PO Take by mouth     • MAGNESIUM GLYCINATE PO Take by mouth     • NON FORMULARY Femdophilus     • Probiotic Product (ULTRAFLORA IMMUNE HEALTH PO) Take by mouth     • Zinc Gluconate 15 MG TABS Take 15 mg by mouth daily       No current facility-administered medications for this visit           Health Maintenance     Health Maintenance   Topic Date Due   • Hepatitis C Screening  Never done   • HIV Screening  Never done   • DTaP,Tdap,and Td Vaccines (1 - Tdap) Never done   • COVID-19 Vaccine (3 - Booster for Moderna series) 10/22/2021   • Influenza Vaccine (1) Never done   • Breast Cancer Screening: Mammogram  05/12/2023   • Annual Physical  06/30/2023   • BMI: Followup Plan  08/01/2023   • Colorectal Cancer Screening  09/12/2023   • Cervical Cancer Screening  09/17/2023   • Depression Screening  01/16/2024   • BMI: Adult  01/16/2024   • Pneumococcal Vaccine: Pediatrics (0 to 5 Years) and At-Risk Patients (6 to 59 Years)  Aged Out   • HIB Vaccine  Aged Out   • IPV Vaccine  Aged Out   • Hepatitis A Vaccine  Aged Out   • Meningococcal ACWY Vaccine  Aged Out   • HPV Vaccine  Aged [employer] History   Administered Date(s) Administered   • COVID-19 MODERNA VACC 0 5 ML IM 07/30/2021, 08/27/2021          Basilio Keys DO

## 2023-01-24 ENCOUNTER — TELEPHONE (OUTPATIENT)
Dept: FAMILY MEDICINE CLINIC | Facility: CLINIC | Age: 58
End: 2023-01-24

## 2023-01-24 DIAGNOSIS — K76.89 LIVER CYST: Primary | ICD-10-CM

## 2023-01-24 NOTE — TELEPHONE ENCOUNTER
Pt had an order for an ultrasound of the abdomen ordered by her previous PCP in Aug of 2022 with a DX code of [id number]   Pt asked if it could be re-ordered under your name so results go to you    Please advise

## 2023-02-04 ENCOUNTER — HOSPITAL ENCOUNTER (OUTPATIENT)
Dept: ULTRASOUND IMAGING | Facility: HOSPITAL | Age: 58
Discharge: HOME | End: 2023-02-04
Attending: FAMILY MEDICINE

## 2023-02-04 DIAGNOSIS — R31.9 HEMATURIA, UNSPECIFIED TYPE: ICD-10-CM

## 2023-02-04 DIAGNOSIS — K76.89 LIVER CYST: ICD-10-CM

## 2023-02-09 ENCOUNTER — TELEPHONE (OUTPATIENT)
Dept: OTHER | Facility: OTHER | Age: 58
End: 2023-02-09

## 2023-02-09 NOTE — TELEPHONE ENCOUNTER
Patient had two US done on 2/4/23  The results have not been signed and released and patient has a 1000 appt with liver specialists tomorrow 2/10/23 who will need the results

## 2023-02-09 NOTE — TELEPHONE ENCOUNTER
Called reading room to have [address] read  They are going to look into why  It has not been read yet but will be today    Pt advised

## 2023-02-11 DIAGNOSIS — D18.03 HEMANGIOMA OF LIVER: Primary | ICD-10-CM

## 2023-03-15 ENCOUNTER — HOSPITAL ENCOUNTER (OUTPATIENT)
Dept: MRI IMAGING | Facility: HOSPITAL | Age: 58
Discharge: HOME | End: 2023-03-15
Attending: FAMILY MEDICINE

## 2023-03-15 DIAGNOSIS — D18.03 HEMANGIOMA OF LIVER: ICD-10-CM

## 2023-03-15 RX ADMIN — GADOBUTROL 4 ML: 604.72 INJECTION INTRAVENOUS at 19:41

## 2023-03-30 ENCOUNTER — HOSPITAL ENCOUNTER (OUTPATIENT)
Dept: NEUROLOGY | Facility: CLINIC | Age: 58
End: 2023-03-30

## 2023-03-30 DIAGNOSIS — M54.12 RADICULOPATHY, CERVICAL REGION: ICD-10-CM

## 2023-03-30 DIAGNOSIS — G56.21 ULNAR NEUROPATHY OF RIGHT UPPER EXTREMITY: ICD-10-CM

## 2023-04-07 ENCOUNTER — RA CDI HCC (OUTPATIENT)
Dept: OTHER | Facility: HOSPITAL | Age: 58
End: 2023-04-07

## 2023-04-07 NOTE — PROGRESS NOTES
[facility] 75  coding opportunities       Chart reviewed, no opportunity found: CHART REVIEWED, NO OPPORTUNITY FOUND        Patients Insurance        Commercial Insurance: Elliott Atkinson

## 2023-04-17 PROBLEM — R73.01 ABNORMAL FASTING GLUCOSE: Status: RESOLVED | Noted: 2018-05-31 | Resolved: 2023-04-17

## 2023-05-05 ENCOUNTER — EVALUATION (OUTPATIENT)
Dept: PHYSICAL THERAPY | Facility: CLINIC | Age: 58
End: 2023-05-05

## 2023-05-05 DIAGNOSIS — M25.562 CHRONIC PAIN OF LEFT KNEE: Primary | ICD-10-CM

## 2023-05-05 DIAGNOSIS — G89.29 CHRONIC PAIN OF LEFT KNEE: Primary | ICD-10-CM

## 2023-05-05 NOTE — PROGRESS NOTES
PT Evaluation     Today's date: 2023  Patient name: Haydee Rodríguez  : 1965  MRN: [id number]  Referring provider: Eryn Edwards DO  Dx:   Encounter Diagnosis     ICD-10-CM    1  Chronic pain of left knee  M25 562 Ambulatory Referral to Physical Therapy    G89 29     Possible mild MCL sprain - referring to PT  Assessment  Assessment details: Patient is a 65 y o  female referred to PT by Dr Leigh Ann Madrid with a dx of L knee pain  Patient reports onset a long standing history of L knee pain  The pain is reported to be medially with occasional posterior and anterior knee pain  She reports the pain is intermittent in nature and of moderate intensity  Clinical examination is consistent with left knee derangement with directional preference to loaded knee extension  No other referral appears necessary at this time  Impairments: abnormal or restricted ROM, activity intolerance, impaired physical strength, lacks appropriate home exercise program and pain with function  Functional limitations: IADLs; Gait toleranceUnderstanding of Dx/Px/POC: good   Prognosis: good    Goals  Short Term goals - 4 weeks  1  Patient will be independent and compliant with a HEP  2   Patient will report a 50% decrease in pain complaints  Long Term goals - 8 weeks  1  Patient will report elimination of pain complaints  2   Patient will return to all IADLs without restriction  3   Patient will return to all recreational activities without restriction        Plan  Patient would benefit from: skilled physical therapy  Planned therapy interventions: joint mobilization, manual therapy, neuromuscular re-education, patient education, postural training, therapeutic exercise and home exercise program  Frequency: 1x week  Duration in visits: 5  Duration in weeks: 7  Plan of Care beginning date: 2023  Plan of Care expiration date: 2023  Treatment plan discussed with: patient        Subjective Evaluation    History of Present Illness  Mechanism of injury: Chronic L medial knee pain          Recurrent probem    Quality of life: good    Pain  Current pain ratin  At best pain ratin  At worst pain ratin  Location: Medial knee; occ posterior and anterior  Quality: discomfort  Aggravating factors: running and stair climbing (knee flexion)  Progression: no change      Diagnostic Tests  X-ray: normal  Treatments  Current treatment: physical therapy  Patient Goals  Patient goals for therapy: decreased pain, increased motion, independence with ADLs/IADLs and return to sport/leisure activities          Objective     Active Range of Motion     Lumbar   Normal active range of motion  Left Knee   Flexion: WFL and with pain  Extension: WFL and with pain    Right Knee   Normal active range of motion    Passive Range of Motion   Left Knee   Flexion: WFL and with pain  Extension: WFL and with pain    Right Knee   Normal passive range of motion    Strength/Myotome Testing     Left Knee   Flexion: 5  Extension: 5    Right Knee   Flexion: 5  Extension: 5    Tests     Additional Tests Details  Repeated loaded knee extension - decrease/better - baselines improved (squat; knee extension ROM)             Precautions: None      Manuals                                                                 Neuro Re-Ed                                                                                                        Ther Ex             Loaded knee extension - baseline squat/end range ext             SAQ             LAQ                                                                              Ther Activity                                       Gait Training                                       Modalities

## 2023-05-11 ENCOUNTER — OFFICE VISIT (OUTPATIENT)
Dept: PHYSICAL THERAPY | Facility: CLINIC | Age: 58
End: 2023-05-11

## 2023-05-11 DIAGNOSIS — M25.562 CHRONIC PAIN OF LEFT KNEE: Primary | ICD-10-CM

## 2023-05-11 DIAGNOSIS — G89.29 CHRONIC PAIN OF LEFT KNEE: Primary | ICD-10-CM

## 2023-05-11 NOTE — PROGRESS NOTES
Daily Note     Today's date: 2023  Patient name: Ward Moseley  : 1965  MRN: [id number]  Referring provider: Anastacio Hoang DO  Dx:   Encounter Diagnosis     ICD-10-CM    1  Chronic pain of left knee  M25 562     G89 29                      Subjective: Reports noting improvement in pain complaints with HEP  Objective: See treatment diary below      Assessment: Made slight adjustment to loaded knee extension to promote more loading through knee - patient improved  Remains hesitant to flex knee despite lack of pain      Plan: Continue per plan of care        Precautions: None      Manuals             Tibia ER mob ELROY                                                   Neuro Re-Ed                                                                                                        Ther Ex             Loaded knee extension - baseline squat/end range ext 2x10            SAQ             LAQ                                                                              Ther Activity                                       Gait Training                                       Modalities

## 2023-05-15 DIAGNOSIS — Z12.31 ENCOUNTER FOR SCREENING MAMMOGRAM FOR BREAST CANCER: ICD-10-CM

## 2023-05-18 ENCOUNTER — OFFICE VISIT (OUTPATIENT)
Dept: PHYSICAL THERAPY | Facility: CLINIC | Age: 58
End: 2023-05-18

## 2023-05-18 DIAGNOSIS — M25.562 CHRONIC PAIN OF LEFT KNEE: Primary | ICD-10-CM

## 2023-05-18 DIAGNOSIS — G89.29 CHRONIC PAIN OF LEFT KNEE: Primary | ICD-10-CM

## 2023-05-18 NOTE — PROGRESS NOTES
Daily Note     Today's date: 2023  Patient name: Arlette Blue  : 1965  MRN: [id number]  Referring provider: Gill George DO  Dx: No diagnosis found  Subjective: Reports slight set back but still feels she is improved  Does report medial knee pain upon entering  Objective: See treatment diary below      Assessment: Able to abolish pain with loaded knee extension and improved knee flexion and ability to squat  Patient not performing HEP when in pain and educated on rationale upon performance when in pain  Plan: Continue per plan of care        Precautions: None      Manuals            Tibia ER mob ELROY ELROY                                                  Neuro Re-Ed                                                                                                        Ther Ex             Loaded knee extension - baseline squat/end range ext 2x10 3x10           SAQ             LAQ             Prone QS  5s x10                                     Free squat  2x10                        Ther Activity                                       Gait Training                                       Modalities

## 2023-06-01 ENCOUNTER — OFFICE VISIT (OUTPATIENT)
Dept: PHYSICAL THERAPY | Facility: CLINIC | Age: 58
End: 2023-06-01

## 2023-06-01 DIAGNOSIS — G89.29 CHRONIC PAIN OF LEFT KNEE: Primary | ICD-10-CM

## 2023-06-01 DIAGNOSIS — M25.562 CHRONIC PAIN OF LEFT KNEE: Primary | ICD-10-CM

## 2023-06-01 NOTE — PROGRESS NOTES
Daily Note     Today's date: 2023  Patient name: Mounika Beckman  : 1965  MRN: [id number]  Referring provider: Ahmet Engel DO  Dx:   Encounter Diagnosis     ICD-10-CM    1  Chronic pain of left knee  M25 562     G89 29                      Subjective: Comes to PT with complaints of pain medial and posterior knee  Reports when she did the exercises at home while in pain she had increased pain and it remained increased over the course of a couple days  Objective: See treatment diary below      Assessment: Able to abolish symptoms and restore motion with tibiofemoral mobs    Plan: Continue per plan of care        Precautions: None      Manuals           Tibia ER mob ELROY ELROY ELROY          Post tib mobs   ELROY                                    Neuro Re-Ed                                                                                                        Ther Ex             Loaded knee extension - baseline squat/end range ext 2x10 3x10 3x10          SAQ             LAQ             Prone QS  5s x10 5s x10                                    Free squat  2x10 2x10                       Ther Activity                                       Gait Training                                       Modalities

## 2023-06-12 ENCOUNTER — TELEPHONE (OUTPATIENT)
Dept: FAMILY MEDICINE CLINIC | Facility: CLINIC | Age: 58
End: 2023-06-12

## 2023-06-12 DIAGNOSIS — G89.29 CHRONIC PAIN OF LEFT KNEE: Primary | ICD-10-CM

## 2023-06-12 DIAGNOSIS — M25.562 CHRONIC PAIN OF LEFT KNEE: Primary | ICD-10-CM

## 2023-06-12 NOTE — TELEPHONE ENCOUNTER
Patient called and stated that she has been seeing PT since May 5th for her Left knee, and she said it doesn't seem to be helping, she was sitting when she was on the phone with me and she said that her knee had some pain in it, she wanted to know if you can order like and MRI   Please advise

## 2023-06-16 ENCOUNTER — HOSPITAL ENCOUNTER (OUTPATIENT)
Dept: MRI IMAGING | Facility: HOSPITAL | Age: 58
End: 2023-06-16
Payer: COMMERCIAL

## 2023-06-16 DIAGNOSIS — G89.29 CHRONIC PAIN OF LEFT KNEE: ICD-10-CM

## 2023-06-16 DIAGNOSIS — M25.562 CHRONIC PAIN OF LEFT KNEE: ICD-10-CM

## 2023-06-16 PROCEDURE — 73721 MRI JNT OF LWR EXTRE W/O DYE: CPT

## 2023-06-16 PROCEDURE — G1004 CDSM NDSC: HCPCS

## 2023-06-19 ENCOUNTER — OFFICE VISIT (OUTPATIENT)
Dept: PHYSICAL THERAPY | Facility: CLINIC | Age: 58
End: 2023-06-19
Payer: COMMERCIAL

## 2023-06-19 DIAGNOSIS — M25.562 CHRONIC PAIN OF LEFT KNEE: Primary | ICD-10-CM

## 2023-06-19 DIAGNOSIS — G89.29 CHRONIC PAIN OF LEFT KNEE: Primary | ICD-10-CM

## 2023-06-19 NOTE — PROGRESS NOTES
"Patient reports to PT today stating she had an MRI of her L knee and is awaiting results  Reports she does feel like her patella \"got stuck\"  Some days the knee is reported to be good and others are not as good  Instructed patient we will await MRI results before resuming PT  She is in agreement with this plan  No charge visit      "

## 2023-06-21 DIAGNOSIS — M25.562 CHRONIC PAIN OF LEFT KNEE: Primary | ICD-10-CM

## 2023-06-21 DIAGNOSIS — S83.32XS: ICD-10-CM

## 2023-06-21 DIAGNOSIS — G89.29 CHRONIC PAIN OF LEFT KNEE: Primary | ICD-10-CM

## 2023-06-21 NOTE — PROGRESS NOTES
Patient ID: Jeffy Puente is a 65 y o  female  Assessment/Plan:    Idiopathic peripheral neuropathy  Patient is a 65year old woman with hx of dystonia, idiopathic peripheral neuropathy, ulnar neuropathy, copper deficiency, anxiety presenting for evaluation of possible Fede Disease  Patient multiple complaints but neurologically has generalized tingling sensation since at least 2016, mostly in lower extremities that has worsened since 2021  Patient has poor sleep, sleeping 4-5 hours and has had sleep study done in 2004 which states according to patient that she sleeps in 2 phases  According to patient, Ophthalmology saw patient and saw no Kayer-Fleischer Ring  Family history of sleep problems including narcolepsy  Exam: No focal numbness, weakness, visual deficits, ambulatory dysfunction    Pertinent labs as of 2023  - Copper level 12/2/2022 109 normal  - Ceruloplasmin level 8/24/2022 31 normal  - Urinary copper excretion 8/24/2022 <5  - B12 7/1/2022: 361  - A1c 7/1/2022: 5 3%    EMG right upper extremity: upper extremity no median nor ulnar neuropathy    Current medications: None    Impression: Ceruloplasmin and copper level normal range with <40 urinary excretion with no Jerrilyn Samra Ring according to Ophthalmology thus unlikely Fede's Disease  Overall tingling sensation throughout with general joint pain will consider rheumatologic conditions especially in setting of abnormal MARYELLEN in the past     Plan:  Patient is unlikely to have had Lawanda Fadumo disease  Recommend patient seen Rheumatology- sees patient in 10/10/2023  Ordered Autoimmune labs concerning patient's last MARYELLEN being positive 2021 with 1:320 gradient including (MARYELLEN, ANCA, Sjogren's)    Also ordered B6, MMA (with Vitamin B12), SPEP to complete workup for possible neuropathy  Ordered iron panel due to greater nighttime tingling sensation causing RLS   -If iron deficient consider iron supplementation  Will hold on EMG 2 lower limb at this time as "patient had tear of left knee and will consider again in next visit  Patient to followup with sleep medicine concerning insomnia  Patient to followup in 4 months          Other insomnia  Patient reports poor sleep with frequent awakenings  Amount of sleep range from 0 to 5 hours and not restful  Patient had previous sleep study in 2004 but could not find in chart and to patient, the sleep study mentioned that she slept in \"2 phases  \"    Ordered sleep medicine sleep consult for sleep problems    RLS (restless legs syndrome)  Patient reports increased tingling sensation in nighttime since 2021 which is affecting her sleep  Potentially could be RLS symptoms with greater urge to move concerning tingling sensation  Plan:  Talked with patient about pharmacologic agents for RLS such as gabapentin- patient has declined at this time  Will check iron panel  - If low iron or other abnormal levels on panel, consider iron supplementation       Diagnoses and all orders for this visit:    Other insomnia  -     Ambulatory Referral to Sleep Medicine; Future    Copper deficiency  -     Ambulatory Referral to Neurology    Idiopathic peripheral neuropathy  -     Ambulatory Referral to Neurology  -     Methylmalonic acid, serum; Future  -     MARYELLEN Screen w/ Reflex to Titer/Pattern; Future  -     Iron Panel (Includes Ferritin, Iron Sat%, Iron, and TIBC); Future  -     Protein electrophoresis, serum; Future  -     Anti-neutrophilic cytoplasmic antibody; Future  -     Sjogren's Antibodies; Future  -     Vitamin B6; Future    RLS (restless legs syndrome)         Seen and discussed with Dr Jossie Blanchard    Subjective:    HPI    Patient is a 65year old woman with hx of dystonia, idiopathic peripheral neuropathy, ulnar neuropathy, copper deficiency, anxiety presenting for evaluation to ruleout Fede's Disease  Patient having diarrhea and blood in urine  Patient saw ophthalmology who did not see a Asim Chamber ring   Copper level normal, " ceruloplasmin normal  MARYELLEN 2021 positive, speckled pattern 1:320  Numbness and tingling over the both legs and also arms also had burning pain on right arm at least before 2016  Tingling mostly when lying down  Patient also have back pain intermittently  No shooting or burning pain on arms and legs  Tingling in legs became worse at night since 2020  These comes and go monthly  Patient also having headaches on forehead and top of head with right eye pain which are every 2 weeks and does not take medications for it  Patient stated she can't open jars  Patient is able to walk well besides the left leg  Patient has dizziness lightheadedness with possible room spinning sensation which was last year but no vertigo since then  Patient going to physical therapy for left leg but has stopped due to left leg tear  Patient has trips but no falls recently  Patient has times that she could not sleep  Most night she sleeps 4-5 hours of sleep that is interrupted  Sleep study 2004 done years ago but not in chart  Family history of sleep problems including narcolepsy  Patient does get anxious but she says only with doctors  Family history of cancer and MS  Patient's biggest concern is losing weight  Patient lost weight 14lbs within 6 months  Patient having sleep disorders  Patient has tingling in the legs bilaterally at night time and when sitting watching television  Patient has ringing in ears and went to ophthalmology for right eye blurry vision  Patient has lump in back of head and has pain from there that leads to the neck  Patient every morning feels like she has anxiety and has vivid dreams and has sleep paralysis  Patient 8/2022 had episode of bloody urine, diarrhea, stomach problems  Patient had concerns about Francee Kid Disease and patient was referred to neurology concerning this  This episode has since resolved according to patient      Prior evaluation has included EMG upper limb which showed no ulnar or median neuropathy  8/26/2022, 24 hour urine copper <5 and Copper 109 normal  Also has been having diarrhea and vomiting  8/24/2022- ceruloplasmin 31  CMP and CBC 7/2022 no anemia and no AST/ALT deficiencies  Patient followed with Dr Niles Cruz 2016 for headaches, dystonia, idiopathic neuropathy  Patient found to have a low copper level of 4 reportedly  Patient was placed on copper caps at that visit  The following portions of the patient's history were reviewed and updated as appropriate:   She  has a past medical history of Allergic, Degenerative tear of left medial meniscus, GERD (gastroesophageal reflux disease), Lactose intolerance, Urinary tract infection (comes and goes blood in urine), and Visual impairment (posterior vitreous detachment)  She   Patient Active Problem List    Diagnosis Date Noted   • RLS (restless legs syndrome) 06/22/2023   • Ulnar neuropathy of right upper extremity 08/01/2022   • Other insomnia 08/01/2022   • Underweight 08/01/2022   • Hearing loss 08/01/2022   • Vitamin B12 deficiency 07/21/2022   • Liver cyst 08/03/2021   • Positive MARYELLEN (antinuclear antibody) 07/30/2021   • Anxiety 07/30/2021   • Other hyperlipidemia 05/31/2018   • Constipation 05/31/2018   • Coccygeal pain 05/31/2018   • Costochondritis 05/31/2018   • Seasonal allergic rhinitis due to pollen 05/31/2018   • GERD (gastroesophageal reflux disease) 05/31/2018   • Copper deficiency 06/09/2016   • Idiopathic peripheral neuropathy 06/09/2016   • Malabsorption syndrome 06/09/2016   • Mild vitamin D deficiency 06/09/2016   • Small fiber neuropathy 06/09/2016   • Dystonia 04/28/2016   • TACS (trigeminal autonomic cephalgias) 04/28/2016   • Endometriosis 09/04/2012     She  has a past surgical history that includes Dilation and curettage of uterus; Tonsillectomy; Hernia repair; Bladder surgery; and pr colonoscopy flx dx w/collj spec when pfrmd (N/A, 09/12/2018)    Her family history includes ADD / ADHD in her brother; "Alcohol abuse in her brother; Arthritis in her maternal grandmother; Autoimmune disease in her paternal aunt and paternal aunt; Breast cancer in her paternal grandmother; Cancer in her mother; Colon cancer in her father; Dementia in her maternal grandmother; Diabetes in her maternal uncle; Heart disease in her maternal grandfather, maternal grandmother, and maternal uncle; Hyperlipidemia in her mother; Hypertension in her brother and mother; Kidney disease in her paternal grandfather; Liver disease in her mother; Mental illness in her brother and mother; OCD in her brother; Pancreatic cancer in her mother; Skin cancer in her paternal grandmother  She  reports that she quit smoking about 36 years ago  Her smoking use included cigarettes  She started smoking about 43 years ago  She has a 8 00 pack-year smoking history  She has never used smokeless tobacco  She reports that she does not currently use drugs after having used the following drugs: Marijuana  She reports that she does not drink alcohol  Current Outpatient Medications   Medication Sig Dispense Refill   • D-MANNOSE PO Take by mouth     • MAGNESIUM GLYCINATE PO Take by mouth     • NON FORMULARY Femdophilus     • Probiotic Product ([employer] PO) Take by mouth     • Zinc Gluconate 15 MG TABS Take 15 mg by mouth daily       No current facility-administered medications for this visit  Current Outpatient Medications on File Prior to Visit   Medication Sig   • D-MANNOSE PO Take by mouth   • MAGNESIUM GLYCINATE PO Take by mouth   • NON FORMULARY Femdophilus   • Probiotic Product (ULTRA[employer] PO) Take by mouth   • Zinc Gluconate 15 MG TABS Take 15 mg by mouth daily     No current facility-administered medications on file prior to visit  She is allergic to sulfa antibiotics, neomycin, penicillins, azithromycin, and levaquin [levofloxacin]            Objective:    Blood pressure 130/60, pulse 88, height 5' 2\" (1 575 m), weight 42 kg " (92 lb 9 6 oz), last menstrual period 02/27/2021, not currently breastfeeding  Physical Exam  Eyes:      Extraocular Movements: Extraocular movements intact  Neurological:      Motor: Motor strength is normal      Coordination: Coordination is intact  Romberg sign negative  Deep Tendon Reflexes: Reflexes are normal and symmetric  Psychiatric:         Speech: Speech normal          Neurological Exam  Mental Status  Awake, alert and oriented to person, place and time  Speech is normal  Language is fluent with no aphasia  Cranial Nerves  CN II: Visual acuity is normal   CN III, IV, VI: Extraocular movements intact bilaterally  CN V: Facial sensation is normal   CN VII: Full and symmetric facial movement  CN XI: Shoulder shrug strength is normal   CN XII: Tongue midline without atrophy or fasciculations  Motor  Normal muscle bulk throughout  No fasciculations present  Normal muscle tone  No abnormal involuntary movements  Strength is 5/5 throughout all four extremities  Sensory  Light touch is normal in upper and lower extremities  Pinprick is normal in upper and lower extremities  Vibration is normal in upper and lower extremities  Proprioception is normal in upper and lower extremities  Reflexes  Deep tendon reflexes are 2+ and symmetric in all four extremities  Coordination    Finger-to-nose, rapid alternating movements and heel-to-shin normal bilaterally without dysmetria  Gait  Normal casual, toe, heel and tandem gait  Romberg is absent  ROS:    Review of Systems   Constitutional: Positive for fatigue  HENT: Positive for tinnitus  Gastrointestinal: Positive for nausea  Genitourinary: Positive for dysuria  Musculoskeletal: Positive for back pain and gait problem  Skin: Positive for rash  Neurological: Positive for tremors, weakness and numbness

## 2023-06-22 ENCOUNTER — CONSULT (OUTPATIENT)
Dept: NEUROLOGY | Facility: CLINIC | Age: 58
End: 2023-06-22
Payer: COMMERCIAL

## 2023-06-22 VITALS
HEIGHT: 62 IN | HEART RATE: 88 BPM | DIASTOLIC BLOOD PRESSURE: 60 MMHG | WEIGHT: 92.6 LBS | BODY MASS INDEX: 17.04 KG/M2 | SYSTOLIC BLOOD PRESSURE: 130 MMHG

## 2023-06-22 DIAGNOSIS — E61.0 COPPER DEFICIENCY: ICD-10-CM

## 2023-06-22 DIAGNOSIS — G60.9 IDIOPATHIC PERIPHERAL NEUROPATHY: ICD-10-CM

## 2023-06-22 DIAGNOSIS — G47.09 OTHER INSOMNIA: Primary | ICD-10-CM

## 2023-06-22 DIAGNOSIS — G25.81 RLS (RESTLESS LEGS SYNDROME): ICD-10-CM

## 2023-06-22 PROCEDURE — 99204 OFFICE O/P NEW MOD 45 MIN: CPT | Performed by: STUDENT IN AN ORGANIZED HEALTH CARE EDUCATION/TRAINING PROGRAM

## 2023-06-22 NOTE — ASSESSMENT & PLAN NOTE
"Patient reports poor sleep with frequent awakenings  Amount of sleep range from 0 to 5 hours and not restful  Patient had previous sleep study in 2004 but could not find in chart and to patient, the sleep study mentioned that she slept in \"2 phases  \"    Ordered sleep medicine sleep consult for sleep problems  "

## 2023-06-22 NOTE — ASSESSMENT & PLAN NOTE
Patient reports increased tingling sensation in nighttime since 2021 which is affecting her sleep  Potentially could be RLS symptoms with greater urge to move concerning tingling sensation      Plan:  Talked with patient about pharmacologic agents for RLS such as gabapentin- patient has declined at this time  Will check iron panel  - If low iron or other abnormal levels on panel, consider iron supplementation

## 2023-06-22 NOTE — PATIENT INSTRUCTIONS
Please go for blood work for neuropathy    Please go for iron panel blood work for RLS    Normal serum ceruloplasmin, copper, low urinary excretion and no Asim Chamber Rings makes it unlikely Fede's Disease    We will hold EMG for not until next visit    Please go to sleep medicine concerning your sleep problems    Our office is [phone] of you have questions    Followup in 4 months

## 2023-06-23 ENCOUNTER — APPOINTMENT (OUTPATIENT)
Dept: LAB | Facility: CLINIC | Age: 58
End: 2023-06-23
Payer: COMMERCIAL

## 2023-06-23 ENCOUNTER — HOSPITAL ENCOUNTER (EMERGENCY)
Facility: HOSPITAL | Age: 58
Discharge: HOME | End: 2023-06-23
Attending: EMERGENCY MEDICINE
Payer: COMMERCIAL

## 2023-06-23 VITALS
DIASTOLIC BLOOD PRESSURE: 77 MMHG | RESPIRATION RATE: 16 BRPM | HEART RATE: 84 BPM | OXYGEN SATURATION: 100 % | SYSTOLIC BLOOD PRESSURE: 142 MMHG | TEMPERATURE: 97.7 F

## 2023-06-23 DIAGNOSIS — E53.8 VITAMIN B12 DEFICIENCY: ICD-10-CM

## 2023-06-23 DIAGNOSIS — R63.4 WEIGHT LOSS: ICD-10-CM

## 2023-06-23 DIAGNOSIS — E55.9 MILD VITAMIN D DEFICIENCY: ICD-10-CM

## 2023-06-23 DIAGNOSIS — G60.9 IDIOPATHIC PERIPHERAL NEUROPATHY: ICD-10-CM

## 2023-06-23 DIAGNOSIS — Z13.1 SCREENING FOR DIABETES MELLITUS: ICD-10-CM

## 2023-06-23 DIAGNOSIS — E78.49 OTHER HYPERLIPIDEMIA: ICD-10-CM

## 2023-06-23 DIAGNOSIS — R55 SYNCOPE: Primary | ICD-10-CM

## 2023-06-23 LAB
25(OH)D3 SERPL-MCNC: 83.4 NG/ML (ref 30–100)
ALBUMIN SERPL BCP-MCNC: 4.3 G/DL (ref 3.5–5)
ALP SERPL-CCNC: 89 U/L (ref 34–104)
ALT SERPL W P-5'-P-CCNC: 18 U/L (ref 7–52)
ANA SER QL IA: NEGATIVE
ANION GAP SERPL CALCULATED.3IONS-SCNC: 7 MMOL/L
AST SERPL W P-5'-P-CCNC: 18 U/L (ref 13–39)
BASOPHILS # BLD AUTO: 0.03 THOUSANDS/ÂΜL (ref 0–0.1)
BASOPHILS NFR BLD AUTO: 1 % (ref 0–1)
BILIRUB SERPL-MCNC: 0.9 MG/DL (ref 0.2–1)
BUN SERPL-MCNC: 14 MG/DL (ref 5–25)
CALCIUM SERPL-MCNC: 9.3 MG/DL (ref 8.4–10.2)
CHLORIDE SERPL-SCNC: 102 MMOL/L (ref 96–108)
CHOLEST SERPL-MCNC: 236 MG/DL
CO2 SERPL-SCNC: 30 MMOL/L (ref 21–32)
CREAT SERPL-MCNC: 0.83 MG/DL (ref 0.6–1.3)
EOSINOPHIL # BLD AUTO: 0.08 THOUSAND/ÂΜL (ref 0–0.61)
EOSINOPHIL NFR BLD AUTO: 2 % (ref 0–6)
ERYTHROCYTE [DISTWIDTH] IN BLOOD BY AUTOMATED COUNT: 13 % (ref 11.6–15.1)
FERRITIN SERPL-MCNC: 68 NG/ML (ref 11–307)
GFR SERPL CREATININE-BSD FRML MDRD: 79 ML/MIN/1.73SQ M
GLUCOSE P FAST SERPL-MCNC: 84 MG/DL (ref 65–99)
HCT VFR BLD AUTO: 43.6 % (ref 34.8–46.1)
HDLC SERPL-MCNC: 82 MG/DL
HGB BLD-MCNC: 14.4 G/DL (ref 11.5–15.4)
IMM GRANULOCYTES # BLD AUTO: 0 THOUSAND/UL (ref 0–0.2)
IMM GRANULOCYTES NFR BLD AUTO: 0 % (ref 0–2)
IRON SATN MFR SERPL: 34 % (ref 15–50)
IRON SERPL-MCNC: 139 UG/DL (ref 50–170)
LDLC SERPL CALC-MCNC: 141 MG/DL (ref 0–100)
LYMPHOCYTES # BLD AUTO: 2.55 THOUSANDS/ÂΜL (ref 0.6–4.47)
LYMPHOCYTES NFR BLD AUTO: 56 % (ref 14–44)
MCH RBC QN AUTO: 30.4 PG (ref 26.8–34.3)
MCHC RBC AUTO-ENTMCNC: 33 G/DL (ref 31.4–37.4)
MCV RBC AUTO: 92 FL (ref 82–98)
MONOCYTES # BLD AUTO: 0.4 THOUSAND/ÂΜL (ref 0.17–1.22)
MONOCYTES NFR BLD AUTO: 9 % (ref 4–12)
NEUTROPHILS # BLD AUTO: 1.47 THOUSANDS/ÂΜL (ref 1.85–7.62)
NEUTS SEG NFR BLD AUTO: 32 % (ref 43–75)
NRBC BLD AUTO-RTO: 0 /100 WBCS
PLATELET # BLD AUTO: 202 THOUSANDS/UL (ref 149–390)
PMV BLD AUTO: 11.4 FL (ref 8.9–12.7)
POTASSIUM SERPL-SCNC: 3.4 MMOL/L (ref 3.5–5.3)
PROT SERPL-MCNC: 7 G/DL (ref 6.4–8.4)
RBC # BLD AUTO: 4.74 MILLION/UL (ref 3.81–5.12)
SODIUM SERPL-SCNC: 139 MMOL/L (ref 135–147)
TIBC SERPL-MCNC: 407 UG/DL (ref 250–450)
TRIGL SERPL-MCNC: 67 MG/DL
VIT B12 SERPL-MCNC: 625 PG/ML (ref 180–914)
WBC # BLD AUTO: 4.53 THOUSAND/UL (ref 4.31–10.16)

## 2023-06-23 PROCEDURE — 99284 EMERGENCY DEPT VISIT MOD MDM: CPT

## 2023-06-23 PROCEDURE — 84207 ASSAY OF VITAMIN B-6: CPT

## 2023-06-23 PROCEDURE — 83540 ASSAY OF IRON: CPT

## 2023-06-23 PROCEDURE — 83550 IRON BINDING TEST: CPT

## 2023-06-23 PROCEDURE — 83918 ORGANIC ACIDS TOTAL QUANT: CPT

## 2023-06-23 PROCEDURE — 86038 ANTINUCLEAR ANTIBODIES: CPT

## 2023-06-23 PROCEDURE — 93005 ELECTROCARDIOGRAM TRACING: CPT

## 2023-06-23 PROCEDURE — 82607 VITAMIN B-12: CPT

## 2023-06-23 PROCEDURE — 83520 IMMUNOASSAY QUANT NOS NONAB: CPT

## 2023-06-23 PROCEDURE — 82728 ASSAY OF FERRITIN: CPT

## 2023-06-23 PROCEDURE — 86037 ANCA TITER EACH ANTIBODY: CPT

## 2023-06-23 PROCEDURE — 36415 COLL VENOUS BLD VENIPUNCTURE: CPT

## 2023-06-23 PROCEDURE — 82306 VITAMIN D 25 HYDROXY: CPT

## 2023-06-23 PROCEDURE — 85025 COMPLETE CBC W/AUTO DIFF WBC: CPT

## 2023-06-23 PROCEDURE — 84165 PROTEIN E-PHORESIS SERUM: CPT

## 2023-06-23 PROCEDURE — 86235 NUCLEAR ANTIGEN ANTIBODY: CPT

## 2023-06-23 PROCEDURE — 80053 COMPREHEN METABOLIC PANEL: CPT

## 2023-06-23 PROCEDURE — 80061 LIPID PANEL: CPT

## 2023-06-23 RX ORDER — POTASSIUM CHLORIDE 20 MEQ/1
40 TABLET, EXTENDED RELEASE ORAL ONCE
Status: COMPLETED | OUTPATIENT
Start: 2023-06-23 | End: 2023-06-23

## 2023-06-23 RX ADMIN — POTASSIUM CHLORIDE 40 MEQ: 1500 TABLET, EXTENDED RELEASE ORAL at 11:44

## 2023-06-23 NOTE — ED PROVIDER NOTES
History  Chief Complaint   Patient presents with   • Syncope     Pt had syncopal episode while having 12 tubes of blood drawn  On arrival pt was hyperventilating and found to have carpopedal spasms  By arrival to ER pt resp rate normalized and was starting to relax and was no longer vomiting  History provided by:  Patient   used: No    Syncope  Associated symptoms: no chest pain, no diaphoresis, no dizziness, no fever, no headaches, no nausea, no palpitations, no shortness of breath, no vomiting and no weakness        52-year-old female presenting to emergency department after having syncopal episode while giving blood  Had 12 tubes of blood drawn  Was hyperventilating, carbo pedal spasms, got hot, passed out  Currently is resolved  No chest pain or shortness of breath during the episode  No headache  No fevers  No recent illnesses  Being worked up for neuropathy by neurologist outpatient  Prior to Admission Medications   Prescriptions Last Dose Informant Patient Reported? Taking?    D-MANNOSE PO  Self Yes No   Sig: Take by mouth   MAGNESIUM GLYCINATE PO  Self Yes No   Sig: Take by mouth   NON FORMULARY  Self Yes No   Sig: Femdophilus   Probiotic Product ([employer] PO)  Self Yes No   Sig: Take by mouth   Zinc Gluconate 15 MG TABS  Self Yes No   Sig: Take 15 mg by mouth daily      Facility-Administered Medications: None       Past Medical History:   Diagnosis Date   • Allergic    • Degenerative tear of left medial meniscus    • GERD (gastroesophageal reflux disease)    • Lactose intolerance    • Urinary tract infection comes and goes blood in urine   • Visual impairment posterior vitreous detachment    2020       Past Surgical History:   Procedure Laterality Date   • BLADDER SURGERY     • DILATION AND CURETTAGE OF UTERUS      Miscarriage x2   • HERNIA REPAIR      inguinal, left   • [place] COLONOSCOPY FLX DX W/COLLJ SPEC WHEN PFRMD N/A 09/12/2018    Procedure: EGD AND COLONOSCOPY;  Surgeon: Thelma Vazquez MD;  Location: [facility]; Service: Gastroenterology   • TONSILLECTOMY         Family History   Problem Relation Age of Onset   • Hyperlipidemia Mother    • Hypertension Mother    • Liver disease Mother    • Pancreatic cancer Mother    • Mental illness Mother    • Cancer Mother         Pancreas & MEN-1   • Colon cancer Father    • Hypertension Brother    • Alcohol abuse Brother    • Mental illness Brother    • ADD / ADHD Brother    • OCD Brother    • Heart disease Maternal Grandmother    • Dementia Maternal Grandmother    • Arthritis Maternal Grandmother    • Heart disease Maternal Grandfather    • Skin cancer Paternal Grandmother    • Breast cancer Paternal Grandmother         skin,bone & breast   • Kidney disease Paternal Grandfather    • Heart disease Maternal Uncle    • Diabetes Maternal Uncle    • Autoimmune disease Paternal Aunt         MS   • Autoimmune disease Paternal Aunt         Lupus   • Substance Abuse Neg Hx    • Depression Neg Hx      I have reviewed and agree with the history as documented  E-Cigarette/Vaping   • E-Cigarette Use Former User      E-Cigarette/Vaping Substances   • Nicotine No    • THC No    • CBD No    • Flavoring No    • Other No    • Unknown No      Social History     Tobacco Use   • Smoking status: Former     Packs/day: 1 00     Years: 8 00     Total pack years: 8 00     Types: Cigarettes     Start date: 1980     Quit date: 1987     Years since quittin 4   • Smokeless tobacco: Never   • Tobacco comments:     off and on not constant   Vaping Use   • Vaping Use: Former   Substance Use Topics   • Alcohol use: No   • Drug use: Not Currently     Types: Marijuana       Review of Systems   Constitutional: Negative for chills, diaphoresis and fever  HENT: Negative for congestion and sore throat  Respiratory: Negative for cough, shortness of breath, wheezing and stridor  Cardiovascular: Positive for syncope   Negative for chest pain, palpitations and leg swelling  Gastrointestinal: Negative for abdominal pain, blood in stool, diarrhea, nausea and vomiting  Genitourinary: Negative for dysuria, frequency and urgency  Musculoskeletal: Negative for neck pain and neck stiffness  Skin: Negative for pallor and rash  Neurological: Negative for dizziness, syncope, weakness, light-headedness and headaches  All other systems reviewed and are negative  Physical Exam  Physical Exam  Vitals reviewed  Constitutional:       Appearance: Normal appearance  She is well-developed  HENT:      Head: Normocephalic and atraumatic  Eyes:      Extraocular Movements: Extraocular movements intact  Pupils: Pupils are equal, round, and reactive to light  Cardiovascular:      Rate and Rhythm: Normal rate and regular rhythm  Heart sounds: Normal heart sounds  Pulmonary:      Effort: Pulmonary effort is normal  No respiratory distress  Breath sounds: Normal breath sounds  Abdominal:      General: Bowel sounds are normal       Palpations: Abdomen is soft  Tenderness: There is no abdominal tenderness  Musculoskeletal:         General: No swelling or tenderness  Normal range of motion  Cervical back: Normal range of motion and neck supple  Skin:     General: Skin is warm and dry  Capillary Refill: Capillary refill takes less than 2 seconds  Neurological:      General: No focal deficit present  Mental Status: She is alert and oriented to person, place, and time           Vital Signs  ED Triage Vitals [06/23/23 1040]   Temperature Pulse Respirations Blood Pressure SpO2   97 7 °F (36 5 °C) 68 18 115/64 100 %      Temp Source Heart Rate Source Patient Position - Orthostatic VS BP Location FiO2 (%)   Oral -- -- -- --      Pain Score       --           Vitals:    06/23/23 1040 06/23/23 1148   BP: 115/64 142/77   Pulse: 68 84         Visual Acuity      ED Medications  Medications   potassium chloride (K-DUR,KLOR-CON) CR tablet 40 mEq (40 mEq Oral Given 6/23/23 1144)       Diagnostic Studies  Results Reviewed     None                 No orders to display              Procedures  Procedures         ED Course  ED Course as of 06/23/23 1918   Fri Jun 23, 2023   1158 ECG shows rate of 71, sinus, normal axis, normal QRS, no significant ST or T wave changes, normal intervals, independently interpret by me                                             Medical Decision Making  65year-old with syncope, blood work from the morning shows normal CBC  Electrolytes within normal limits except for slight hypokalemia  EKG to eval for arrhythmias  Patient ambulated in department without difficulty  Discharged with outpatient follow-up    Risk  Prescription drug management  Disposition  Final diagnoses:   Syncope     Time reflects when diagnosis was documented in both MDM as applicable and the Disposition within this note     Time User Action Codes Description Comment    6/23/2023 11:59 AM Efrain Prado Add [R55] Syncope       ED Disposition     ED Disposition   Discharge    Condition   Stable    Date/Time   Fri Jun 23, 2023 11:59 AM    Comment   Nena Sue discharge to home/self care                 Follow-up Information     Follow up With Specialties Details Why Contact Info Additional Information    Pati Emerson, DO Family Medicine In 3 days Reevaluation [address] [id number]       [place] [address] Emergency Department Emergency Medicine  As needed, If symptoms worsen [address] Emergency Department, [address], [place], [place], [address]          Discharge Medication List as of 6/23/2023 12:22 PM      CONTINUE these medications which have NOT CHANGED    Details   D-MANNOSE PO Take by mouth, Historical Med      MAGNESIUM GLYCINATE PO Take by mouth, Historical Med      NON FORMULARY Femdophilus, Historical Med      Probiotic Product ([address] PO) Take by mouth, Historical Med      Zinc Gluconate 15 MG TABS Take 15 mg by mouth daily, Historical Med             No discharge procedures on file      PDMP Review       Value Time User    PDMP Reviewed  Yes 4/16/2023 11:51 AM Fiorella Denise DO          ED Provider  Electronically Signed by           Suzann Epley, MD  06/23/23 1918

## 2023-06-24 LAB
ENA SS-A AB SER-ACNC: <0.2 AI (ref 0–0.9)
ENA SS-B AB SER-ACNC: <0.2 AI (ref 0–0.9)

## 2023-06-26 ENCOUNTER — OFFICE VISIT (OUTPATIENT)
Dept: OBGYN CLINIC | Facility: CLINIC | Age: 58
End: 2023-06-26
Payer: COMMERCIAL

## 2023-06-26 ENCOUNTER — OFFICE VISIT (OUTPATIENT)
Dept: FAMILY MEDICINE CLINIC | Facility: CLINIC | Age: 58
End: 2023-06-26
Payer: COMMERCIAL

## 2023-06-26 VITALS
WEIGHT: 92 LBS | DIASTOLIC BLOOD PRESSURE: 82 MMHG | BODY MASS INDEX: 16.93 KG/M2 | SYSTOLIC BLOOD PRESSURE: 144 MMHG | HEART RATE: 89 BPM | HEIGHT: 62 IN

## 2023-06-26 VITALS
WEIGHT: 91.8 LBS | HEART RATE: 102 BPM | OXYGEN SATURATION: 99 % | RESPIRATION RATE: 16 BRPM | TEMPERATURE: 98 F | DIASTOLIC BLOOD PRESSURE: 82 MMHG | BODY MASS INDEX: 16.79 KG/M2 | SYSTOLIC BLOOD PRESSURE: 110 MMHG

## 2023-06-26 DIAGNOSIS — M25.562 CHRONIC PAIN OF LEFT KNEE: ICD-10-CM

## 2023-06-26 DIAGNOSIS — R63.6 UNDERWEIGHT: ICD-10-CM

## 2023-06-26 DIAGNOSIS — S83.32XS: ICD-10-CM

## 2023-06-26 DIAGNOSIS — G89.29 CHRONIC PAIN OF LEFT KNEE: ICD-10-CM

## 2023-06-26 DIAGNOSIS — R63.4 WEIGHT LOSS: ICD-10-CM

## 2023-06-26 DIAGNOSIS — R55 VASOVAGAL EPISODE: Primary | ICD-10-CM

## 2023-06-26 DIAGNOSIS — R76.8 POSITIVE ANA (ANTINUCLEAR ANTIBODY): ICD-10-CM

## 2023-06-26 LAB — METHYLMALONATE SERPL-SCNC: 107 NMOL/L (ref 0–378)

## 2023-06-26 PROCEDURE — 99213 OFFICE O/P EST LOW 20 MIN: CPT | Performed by: ORTHOPAEDIC SURGERY

## 2023-06-26 NOTE — PROGRESS NOTES
Assessment:  1  Chronic pain of left knee  Ambulatory Referral to Orthopedic Surgery      2  Tear of articular cartilage of left knee, current, sequela  Ambulatory Referral to Orthopedic Surgery        Patient Active Problem List   Diagnosis   • Copper deficiency   • Dystonia   • Endometriosis   • Idiopathic peripheral neuropathy   • Malabsorption syndrome   • Mild vitamin D deficiency   • TACS (trigeminal autonomic cephalgias)   • Small fiber neuropathy   • Other hyperlipidemia   • Constipation   • Coccygeal pain   • Costochondritis   • Seasonal allergic rhinitis due to pollen   • GERD (gastroesophageal reflux disease)   • Positive MARYELLEN (antinuclear antibody)   • Anxiety   • Liver cyst   • Vitamin B12 deficiency   • Ulnar neuropathy of right upper extremity   • Other insomnia   • Underweight   • Hearing loss   • RLS (restless legs syndrome)   • Weight loss   • Vasovagal episode           Plan      66 y/o female with left knee medial meniscus tear  · MRI reviewed with patient   · Diagnostics reviewed and physical exam performed  Diagnosis, treatment options and associated risks were discussed with the patient including no treatment, nonsurgical treatment and potential for surgical intervention  The patient was given the opportunity to ask questions regarding each  · Patient is not interested in an arthroscopy or corticosteroid injection at this time  · Activities as tolerated            Subjective:     Patient ID:    Chief Complaint:Lennie Molina 65 y o  female      HPI    Patient presents to the office today for initial evaluation left knee pain  Patient notes chronic left knee pain, no injury or trauma  Her PCP ordered x-ray's, MRI, and referral to physical therapy  MRI demonstrates medial meniscus tear  Pain is intermittent and moderate, it is localized to the medial knee  She notes some improvements with formal physical therapy but no resolution of symptoms  Pain with twisting and lateral movements  The following portions of the patient's history were reviewed and updated as appropriate: allergies, current medications, past family history, past social history, past surgical history and problem list         Social History     Socioeconomic History   • Marital status: /Civil Union     Spouse name: Not on file   • Number of children: Not on file   • Years of education: Not on file   • Highest education level: Not on file   Occupational History   • Not on file   Tobacco Use   • Smoking status: Former     Packs/day: 1 00     Years: 8 00     Total pack years: 8 00     Types: Cigarettes     Start date: 1980     Quit date: 1987     Years since quittin 5     Passive exposure: Never   • Smokeless tobacco: Never   • Tobacco comments:     off and on not constant   Vaping Use   • Vaping Use: Former   Substance and Sexual Activity   • Alcohol use: No   • Drug use: Not Currently     Types: Marijuana   • Sexual activity: Yes     Partners: Male     Birth control/protection: Condom Male, Post-menopausal   Other Topics Concern   • Not on file   Social History Narrative    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         Social Determinants of Health     Financial Resource Strain: Not on file   Food Insecurity: Not on file   Transportation Needs: Not on file   Physical Activity: Not on file   Stress: Not on file   Social Connections: Not on file   Intimate Partner Violence: Not on file   Housing Stability: Not on file     Past Medical History:   Diagnosis Date   • Allergic    • Degenerative tear of left medial meniscus    • GERD (gastroesophageal reflux disease)    • Lactose intolerance    • Urinary tract infection comes and goes blood in urine   • Visual impairment posterior vitreous detachment         Past Surgical History:   Procedure Laterality Date   • BLADDER SURGERY     • DILATION AND CURETTAGE OF UTERUS      Miscarriage x2   • HERNIA REPAIR      inguinal, left   • [place] COLONOSCOPY FLX DX W/ANA SPEC WHEN PFRMD N/A 09/12/2018    Procedure: EGD AND COLONOSCOPY;  Surgeon: Tiffanie Watkins MD;  Location: [facility]; Service: Gastroenterology   • TONSILLECTOMY       Allergies   Allergen Reactions   • Sulfa Antibiotics Hives   • Neomycin Hives   • Penicillins      Reaction Date: 12Nov2009;    • Azithromycin Rash   • Levaquin [Levofloxacin] Tachycardia     Current Outpatient Medications on File Prior to Visit   Medication Sig Dispense Refill   • D-MANNOSE PO Take by mouth     • MAGNESIUM GLYCINATE PO Take by mouth (Patient not taking: Reported on 6/26/2023)     • NON FORMULARY Femdophilus     • Probiotic Product ([address]) Take by mouth     • Zinc Gluconate 15 MG TABS Take 15 mg by mouth daily (Patient not taking: Reported on 6/26/2023)       No current facility-administered medications on file prior to visit  Objective:    Review of Systems   Constitutional: Negative for chills and fever  HENT: Negative for ear pain and sore throat  Eyes: Negative for pain and visual disturbance  Respiratory: Negative for cough and shortness of breath  Cardiovascular: Negative for chest pain and palpitations  Gastrointestinal: Negative for abdominal pain and vomiting  Genitourinary: Negative for dysuria and hematuria  Musculoskeletal: Negative for arthralgias and back pain  Skin: Negative for color change and rash  Neurological: Negative for seizures and syncope  All other systems reviewed and are negative  Left Knee Exam     Tenderness   The patient is experiencing no tenderness  Range of Motion   Extension: 0   Flexion: 120     Tests   Laurence:  Medial - positive Lateral - negative  Varus: negative Valgus: negative    Other   Erythema: absent  Sensation: normal  Pulse: present  Swelling: none  Effusion: no effusion present    Comments:  (+) Bounce test            Physical Exam  Vitals and nursing note reviewed  HENT:      Head: Normocephalic     Eyes: "Extraocular Movements: Extraocular movements intact  Cardiovascular:      Rate and Rhythm: Normal rate  Pulses: Normal pulses  Pulmonary:      Effort: Pulmonary effort is normal    Musculoskeletal:         General: Normal range of motion  Cervical back: Normal range of motion  Left knee: No effusion  Instability Tests: Medial Laurence test positive  Lateral Laurence test negative  Skin:     General: Skin is warm and dry  Neurological:      General: No focal deficit present  Mental Status: She is alert  Psychiatric:         Behavior: Behavior normal          Procedures  No Procedures performed today    I have personally reviewed pertinent films in PACS  MRI of left knee obtained 6/16/2023 demonstrates complex tear of the medial meniscus posterior horn    Scribe Attestation    I,:  Greg Eaton am acting as a scribe while in the presence of the attending physician :       I,:  Jw Serrano, DO personally performed the services described in this documentation    as scribed in my presence :           Portions of the record may have been created with voice recognition software   Occasional wrong word or \"sound a like\" substitutions may have occurred due to the inherent limitations of voice recognition software   Read the chart carefully and recognize, using context, where substitutions have occurred      "

## 2023-06-26 NOTE — ASSESSMENT & PLAN NOTE
Per pt she has struggled to gain wait and weight seems to continue to go down  She is unsure why, she has been thoroughly evaluated in past for this, no diagnosis to date  With her other reported symptoms including variable temperature and blood pressure would recommend she revisit endocrinology for reassessment

## 2023-06-26 NOTE — ASSESSMENT & PLAN NOTE
Reviewed ER note and pt's account of the events leading up to the episode and reviewed her work up  Reassured her that all things support vasovagal syncope and discussed that a lab draw can be a common trigger  She verbalized understanding  She intends to review with her neurologist as she feels there is a connection with her other symptoms

## 2023-06-26 NOTE — PROGRESS NOTES
Name: Jacek Treviño      : 1965      MRN: [id number]  Encounter Provider: NEGRA Matthews  Encounter Date: 2023   Encounter department: [place]   I have spent a total time of 40 minutes on 23 in caring for this patient including Diagnostic results, Instructions for management, Patient and family education, Impressions, Counseling / Coordination of care, Documenting in the medical record, Reviewing / ordering tests, medicine, procedures  , Obtaining or reviewing history   and Communicating with other healthcare professionals   1  Vasovagal episode  Assessment & Plan:  Reviewed ER note and pt's account of the events leading up to the episode and reviewed her work up  Reassured her that all things support vasovagal syncope and discussed that a lab draw can be a common trigger  She verbalized understanding  She intends to review with her neurologist as she feels there is a connection with her other symptoms  2  Weight loss  Assessment & Plan:  Per pt she has struggled to gain wait and weight seems to continue to go down  She is unsure why, she has been thoroughly evaluated in past for this, no diagnosis to date  With her other reported symptoms including variable temperature and blood pressure would recommend she revisit endocrinology for reassessment  Orders:  -     Ambulatory Referral to Endocrinology; Future    3  Underweight  Assessment & Plan:  See above  Orders:  -     Ambulatory Referral to Endocrinology; Future    4  Positive MARYELLEN (antinuclear antibody)  Assessment & Plan:  MARYELLEN now negative  Pt is scheduled to see another rheumatologist in coming months, prior rheumatology evaluation has been reassuring for lack of auto-immune condition  Subjective      Pt is a 64 yo female here for ER f/u  Seen at [address] [place] [address] ER  following syncopal episode while having blood drawn    She arrived in distress - "hyperventilating and with carpopedal spasms  Her respiratory rate normalized and she was starting to relax  She notes that her blood pressure was very low in the ER  Workup was unrevealing, she had mild hypokalemia and was given PO replacement  PMH includes GERD, liver cyst, malabsorption syndrome, dystonia, peripheral neuropathy, small fiber neuropathy, costochondirtis, copper deficiency, hyperlipidemia, insomnia, RLS, positive MARYELLEN, underweight, vit b12 deficiency  She says she has seen multiple specialists evaluate her including endocrinology, neurology, rheumatologist   She says nobody has ever diagnosed her with anything clear  She says her temperature fluctuates from 94 8-99 8 on her home thermometer  She says she wakes up every day and feels like she is having a panic attack  She says she \"breathes it out  \"  She feels fine during the day  She says she has had issues for several years including knee pain, fluctuating bp, inability to gain weight  She is currently on a gluten free, dairy free diet  Review of Systems   Constitutional: Positive for chills and diaphoresis  Negative for appetite change and fever (temp fluctuates)  Respiratory: Negative for shortness of breath  Cardiovascular: Negative for chest pain  Gastrointestinal: Negative for nausea and vomiting  Genitourinary: Negative for difficulty urinating  Musculoskeletal: Negative for arthralgias (not at present)  Skin: Negative  Neurological: Negative for headaches (occasional R posterior headaches)  Psychiatric/Behavioral: Negative for sleep disturbance  The patient is nervous/anxious          Current Outpatient Medications on File Prior to Visit   Medication Sig   • D-MANNOSE PO Take by mouth   • NON FORMULARY Femdophilus   • Probiotic Product ([address]) Take by mouth   • MAGNESIUM GLYCINATE PO Take by mouth (Patient not taking: Reported on 6/26/2023)   • Zinc Gluconate 15 MG TABS Take 15 mg by mouth " daily (Patient not taking: Reported on 6/26/2023)       Objective     /82 (BP Location: Right arm, Patient Position: Sitting, Cuff Size: Standard)   Pulse 102   Temp 98 °F (36 7 °C) (Tympanic)   Resp 16   Wt 41 6 kg (91 lb 12 8 oz)   LMP 02/27/2021 (Approximate)   SpO2 99%   BMI 16 79 kg/m²     Physical Exam  Vitals reviewed  Constitutional:       General: She is awake  She is not in acute distress  Appearance: Normal appearance  She is well-developed, well-groomed and underweight  She is not ill-appearing or diaphoretic  Eyes:      General: Lids are normal       Conjunctiva/sclera: Conjunctivae normal    Cardiovascular:      Rate and Rhythm: Tachycardia present  Pulmonary:      Effort: Pulmonary effort is normal  No respiratory distress  Skin:     General: Skin is dry  Neurological:      Mental Status: She is alert and oriented to person, place, and time  Psychiatric:         Attention and Perception: Attention normal          Mood and Affect: Mood is anxious  Speech: Speech is rapid and pressured  Behavior: Behavior is cooperative  Thought Content:  Thought content normal          Cognition and Memory: Cognition normal          Judgment: Judgment normal        NEGRA Joseph

## 2023-06-26 NOTE — ASSESSMENT & PLAN NOTE
MARYELLEN now negative  Pt is scheduled to see another rheumatologist in coming months, prior rheumatology evaluation has been reassuring for lack of auto-immune condition

## 2023-06-27 ENCOUNTER — TELEPHONE (OUTPATIENT)
Dept: ENDOCRINOLOGY | Facility: CLINIC | Age: 58
End: 2023-06-27

## 2023-06-27 ENCOUNTER — TELEPHONE (OUTPATIENT)
Dept: NEUROLOGY | Facility: CLINIC | Age: 58
End: 2023-06-27

## 2023-06-27 LAB
ALBUMIN SERPL ELPH-MCNC: 4.4 G/DL (ref 3.2–5.1)
ALBUMIN SERPL ELPH-MCNC: 63.7 % (ref 48–70)
ALPHA1 GLOB SERPL ELPH-MCNC: 0.28 G/DL (ref 0.15–0.47)
ALPHA1 GLOB SERPL ELPH-MCNC: 4.1 % (ref 1.8–7)
ALPHA2 GLOB SERPL ELPH-MCNC: 0.75 G/DL (ref 0.42–1.04)
ALPHA2 GLOB SERPL ELPH-MCNC: 10.9 % (ref 5.9–14.9)
BETA GLOB ABNORMAL SERPL ELPH-MCNC: 0.41 G/DL (ref 0.31–0.57)
BETA1 GLOB SERPL ELPH-MCNC: 5.9 % (ref 4.7–7.7)
BETA2 GLOB SERPL ELPH-MCNC: 4.5 % (ref 3.1–7.9)
BETA2+GAMMA GLOB SERPL ELPH-MCNC: 0.31 G/DL (ref 0.2–0.58)
GAMMA GLOB ABNORMAL SERPL ELPH-MCNC: 0.75 G/DL (ref 0.4–1.66)
GAMMA GLOB SERPL ELPH-MCNC: 10.9 % (ref 6.9–22.3)
IGG/ALB SER: 1.75 {RATIO} (ref 1.1–1.8)
PROT PATTERN SERPL ELPH-IMP: NORMAL
PROT SERPL-MCNC: 6.9 G/DL (ref 6.4–8.2)
VIT B6 SERPL-MCNC: 20.7 UG/L (ref 3.4–65.2)

## 2023-06-27 PROCEDURE — 84165 PROTEIN E-PHORESIS SERUM: CPT | Performed by: STUDENT IN AN ORGANIZED HEALTH CARE EDUCATION/TRAINING PROGRAM

## 2023-06-27 NOTE — TELEPHONE ENCOUNTER
daniel flaherty from 6/26 at 3:28pm-A hi  Yes, I'd like to leave a message for Dr Alexis Hand  I saw him a couple of days ago  My name is Jay Fabry with the [facility]  I went for blood work and I just want to make sure they did all the tests jeremy one he had 7 tests, but I don't see the vitamin b on my, my chart  I did try to message him through my chart but he's not under my providers  So if you could give me a call back please, and let me know if, if we did do the B 6, or if I need to still do that  And I also need to tell him that I was in the emergency room after I had the blood drawn  So I don't know if that has anything to do with the other things me and him discussed  if you could give me a call back  Thank you  Bye   [phone]  -----------------------------------------------  b6 is still in process    Called pt  Made her aware of b6 still in process  States that she will send [employer] message to dr Samson Anglin about her being in the ER        I added dr Samson Anglin to her care team, she should be able to send qualifyorhart message now

## 2023-06-28 LAB
C-ANCA TITR SER IF: NORMAL TITER
MYELOPEROXIDASE AB SER IA-ACNC: <0.2 UNITS (ref 0–0.9)
P-ANCA ATYPICAL TITR SER IF: NORMAL TITER
P-ANCA TITR SER IF: NORMAL TITER
PROTEINASE3 AB SER IA-ACNC: <0.2 UNITS (ref 0–0.9)

## 2023-06-29 ENCOUNTER — TELEPHONE (OUTPATIENT)
Dept: NEUROLOGY | Facility: CLINIC | Age: 58
End: 2023-06-29

## 2023-06-29 NOTE — TELEPHONE ENCOUNTER
Talked with patient concerning ED visit  Patient after blood draw felt dizzy (lightheadedness, presyncopal symptoms) and then had syncopal episode  When woke up had rapid return to baseline mentally but noted right sided tingling and right foot curling that were temporally  Right foot curling resolved when blanket removed according to patient  Per ED neuro exam, no numbness, no neurologic deficits elicited  Reviewed ED note and reassured patient that it is most likely vasovagal syncope based on ED note and patient history  Defer to PCP concerning GI for malabsorption    Defer to PCP concerning Endocrinology for pituitary disorders (Patient states family history of MEN, thus referral seems reasonable)  Patient continuing to find for Endocrinologist     Discussed with patient as patient have continued snoring and intermittent difficulty with sleeping  Patient also states feeling she has intermittent tongue swelling  Patient not on ARB/ACE inhibitors  Reasonable for sleep medicine based on above  Patient wants to hold on seeing sleep medicine for now and reevaluate after next neurology visit  Recommended that if patient had new acute numbness (without tingling) focally, new acute focal weakness that I recommend patient go to the ED ASAP for possible stroke  Patient agreeable with above  Patient had no further questions and appreciative of call

## 2023-06-29 NOTE — TELEPHONE ENCOUNTER
"Barber Elaine: Back to Top      Let patient know that the neuropathy labs (ANCA, vitamin B 6 , SPEP, MMA, Sjogren's, MARYELLEN) along with ferritin and iron saturation came back negative  Called and spoke with patient regarding her results and Dr Adi Reid note above  Patient wanted to let Dr Mari Bello know that after she had blood work drawn she was taken to the ED due to \"passing out\" She ended up staying a couple hours for monitoring  Patient states her BP was elevated (SBP) and her (DBP) was really low  Please review ED note 6/23/23  Patient states her hands and feet were cramped up while being taken to the ED and while she was there  States she told the ER doctor that her foot was cramped in and when he looked at it he told the patient it wasn't   It looked normal  But patient said her brain was telling her that it was? Her Right arm and R leg was tingling the whole night and whole day  ED did a stroke assessment and she passed  No concern for stroke  Patient states the tingling is good today  Patient's last Ca level on 6/23 was 9 3  Her K level was 3 4, and was addressed in ED  Patient is asking if her symptoms can be a result of Endocrine disorder? States her mother was diagnosed with a Pituitary disorder later in life  PCP has referred patient to Endocrine but wait is until later this year  Patient is asking for any recommendations for Endocrine specialists besides Dr Janet Schirmer? Patient also states she wakes up daily with dull headaches, and her  says she snores very loudly at night  She also wakes up Anxious every morning  Referral for Sleep Apnea/ Sleep medicine? Patient is also concerned of significant weight loss of 14 lbs  Is currently seeing a nutritionist  And eats 7 x a day, Has gained like 3 lbs back but is coming back on slowly  Patient is seeing GI in July for evaluation of possible Malabsorption  Please advise on any further recommendations at this time   Patient has not had " any Brain imaging done sine 2016       # [phone]

## 2023-07-01 LAB
ATRIAL RATE: 71 BPM
P AXIS: 83 DEGREES
PR INTERVAL: 182 MS
QRS AXIS: 86 DEGREES
QRSD INTERVAL: 76 MS
QT INTERVAL: 410 MS
QTC INTERVAL: 445 MS
T WAVE AXIS: 74 DEGREES
VENTRICULAR RATE: 71 BPM

## 2023-07-03 ENCOUNTER — ANNUAL EXAM (OUTPATIENT)
Dept: OBGYN CLINIC | Facility: CLINIC | Age: 58
End: 2023-07-03
Payer: COMMERCIAL

## 2023-07-03 VITALS
HEIGHT: 62 IN | WEIGHT: 92 LBS | SYSTOLIC BLOOD PRESSURE: 130 MMHG | BODY MASS INDEX: 16.93 KG/M2 | DIASTOLIC BLOOD PRESSURE: 78 MMHG

## 2023-07-03 DIAGNOSIS — R19.03 PELVIC SWELLING, RLQ: ICD-10-CM

## 2023-07-03 DIAGNOSIS — Z11.51 SCREENING FOR HPV (HUMAN PAPILLOMAVIRUS): ICD-10-CM

## 2023-07-03 DIAGNOSIS — Z12.31 ENCOUNTER FOR SCREENING MAMMOGRAM FOR MALIGNANT NEOPLASM OF BREAST: ICD-10-CM

## 2023-07-03 DIAGNOSIS — Z12.4 ENCOUNTER FOR SCREENING FOR MALIGNANT NEOPLASM OF CERVIX: ICD-10-CM

## 2023-07-03 DIAGNOSIS — R63.4 UNINTENTIONAL WEIGHT LOSS: ICD-10-CM

## 2023-07-03 DIAGNOSIS — Z01.419 WELL WOMAN EXAM WITH ROUTINE GYNECOLOGICAL EXAM: Primary | ICD-10-CM

## 2023-07-03 PROCEDURE — G0476 HPV COMBO ASSAY CA SCREEN: HCPCS | Performed by: PHYSICIAN ASSISTANT

## 2023-07-03 PROCEDURE — 99396 PREV VISIT EST AGE 40-64: CPT | Performed by: PHYSICIAN ASSISTANT

## 2023-07-03 PROCEDURE — G0145 SCR C/V CYTO,THINLAYER,RESCR: HCPCS | Performed by: PHYSICIAN ASSISTANT

## 2023-07-03 NOTE — PROGRESS NOTES
ASSESSMENT & PLAN:   Diagnoses and all orders for this visit:    Well woman exam with routine gynecological exam  -     Liquid-based pap, screening    Encounter for screening for malignant neoplasm of cervix  -     Liquid-based pap, screening    Screening for HPV (human papillomavirus)  -     Liquid-based pap, screening    Encounter for screening mammogram for malignant neoplasm of breast  -     Mammo screening bilateral w 3d & cad; Future    Unintentional weight loss  -     US pelvis complete w transvaginal; Future    Pelvic swelling, RLQ  -     US pelvis complete w transvaginal; Future          The following were reviewed in today's visit: ASCCP guidelines, Gardisil vaccination, STD testing breast self exam, mammography screening ordered, STD testing, exercise, healthy diet and colonoscopy discussed and ordered. Patient to return to office in yearly for annual exam.     All questions have been answered to her satisfaction. CC:  Annual Gynecologic Examination  Chief Complaint   Patient presents with   • Gynecologic Exam     Pt is here for her yearly exam. pap and mammo due. Pt doing well no concerns. HPI: Adolfo Cash is a 65 y.o. [id number] who presents for annual gynecologic examination. She has the following concerns:  20lb unintentional weight loss, being followed by multiple providers. Has colonoscopy scheduled. Patient reports intermittent rlq pelvic swelling at night.  Has not had recent pelvic US      Health Maintenance:    Exercise: not at this time due to tear in knee and unintentional weight loss  Breast exams/breast awareness: yes  Diet: well balanced diet  Last mammogram: 2023  Colorectal cancer screening: has completed in the last 5 years      Past Medical History:   Diagnosis Date   • Allergic    • Degenerative tear of left medial meniscus    • GERD (gastroesophageal reflux disease)    • Hypertension     usually low but spikes   • Lactose intolerance    • Miscarriage 1999, 2000 • Urinary tract infection comes and goes blood in urine   • Visual impairment posterior vitreous detachment    2020       Past Surgical History:   Procedure Laterality Date   • BLADDER SURGERY     • DILATION AND CURETTAGE OF UTERUS      Miscarriage x2   • HERNIA REPAIR      inguinal, left   • [place] COLONOSCOPY FLX DX W/COLLJ SPEC WHEN PFRMD N/A 09/12/2018    Procedure: EGD AND COLONOSCOPY;  Surgeon: Lesia Christensen MD;  [facility]; Service: Gastroenterology   • TONSILLECTOMY         Past OB/Gyn History:   Patient's last menstrual period was 02/27/2021 (approximate). Menopausal status: postmenopausal  Menopausal symptoms: None    Last Pap: 2018 : no abnormalities  History of abnormal Pap smear: yes - ASCUS, 2016    Patient is currently sexually active.    STD testing: no  Current contraception: post menopausal status      Family History  Family History   Problem Relation Age of Onset   • Hyperlipidemia Mother    • Hypertension Mother    • Liver disease Mother    • Pancreatic cancer Mother    • Mental illness Mother    • Cancer Mother         Pancreas & MEN-1   • Stroke Mother         mom had mini stroke   • Colon cancer Father    • Hypertension Brother    • Alcohol abuse Brother    • Mental illness Brother    • ADD / ADHD Brother    • OCD Brother    • Heart disease Maternal Grandmother    • Dementia Maternal Grandmother    • Arthritis Maternal Grandmother    • Heart disease Maternal Grandfather    • Skin cancer Paternal Grandmother    • Breast cancer Paternal Grandmother         skin,bone & breast   • Kidney disease Paternal Grandfather    • Heart disease Maternal Uncle    • Diabetes Maternal Uncle    • Autoimmune disease Paternal Aunt         MS   • Autoimmune disease Paternal Aunt         Lupus   • Substance Abuse Neg Hx    • Depression Neg Hx        Family history of uterine or ovarian cancer: no  Family history of breast cancer: yes  Family history of colon cancer: yes    Social History:  Social History     Socioeconomic History   • Marital status: /Civil Union     Spouse name: Not on file   • Number of children: Not on file   • Years of education: Not on file   • Highest education level: Not on file   Occupational History   • Not on file   Tobacco Use   • Smoking status: Former     Packs/day: 1.00     Years: 8.00     Total pack years: 8.00     Types: Cigarettes     Start date: 1980     Quit date: 1987     Years since quittin.5     Passive exposure: Never   • Smokeless tobacco: Never   • Tobacco comments:     off and on not constant when I was young   Vaping Use   • Vaping Use: Former   Substance and Sexual Activity   • Alcohol use: No   • Drug use: Not Currently     Types: Marijuana   • Sexual activity: Yes     Partners: Male     Birth control/protection: Condom Male, Post-menopausal   Other Topics Concern   • Not on file   Social History Narrative    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         Social Determinants of Health     Financial Resource Strain: Not on file   Food Insecurity: Not on file   Transportation Needs: Not on file   Physical Activity: Not on file   Stress: Not on file   Social Connections: Not on file   Intimate Partner Violence: Not on file   Housing Stability: Not on file     Domestic violence screen: negative    Allergies: Allergies   Allergen Reactions   • Sulfa Antibiotics Hives   • Neomycin Hives   • Penicillins      Reaction Date: 2009;    • Azithromycin Rash   • Levaquin [Levofloxacin] Tachycardia       Medications:    Current Outpatient Medications:   •  D-MANNOSE PO, Take by mouth, Disp: , Rfl:   •  NON FORMULARY, Femdophilus, Disp: , Rfl:   •  Probiotic Product (ULTRAFLORA IMMUNE HEALTH PO), Take by mouth, Disp: , Rfl:     Review of Systems:  Review of Systems   Constitutional: Negative for chills, fever and unexpected weight change. Respiratory: Negative for shortness of breath. Cardiovascular: Negative for chest pain.    Gastrointestinal: Negative for abdominal pain, diarrhea, nausea and vomiting. Skin: Negative for rash. Psychiatric/Behavioral: Negative for dysphoric mood. The patient is not nervous/anxious. Physical Exam:  /78 (BP Location: Right arm, Patient Position: Sitting, Cuff Size: Standard)   Ht 5' 2" (1.575 m)   Wt 41.7 kg (92 lb)   LMP 02/27/2021 (Approximate)   BMI 16.83 kg/m²    Physical Exam  Constitutional:       Appearance: Normal appearance. Genitourinary:      Vulva and urethral meatus normal.      No lesions in the vagina. Right Labia: No rash or lesions. Left Labia: No lesions or rash. No vaginal discharge, erythema or bleeding. Right Adnexa: not tender and no mass present. Left Adnexa: not tender and no mass present. No cervical discharge or lesion. Uterus is not tender. Breasts:     Breasts are symmetrical.      Right: No mass or skin change. Left: No mass or skin change. HENT:      Head: Normocephalic and atraumatic. Cardiovascular:      Rate and Rhythm: Normal rate and regular rhythm. Heart sounds: Normal heart sounds. No murmur heard. No friction rub. No gallop. Pulmonary:      Effort: Pulmonary effort is normal.      Breath sounds: Normal breath sounds. No wheezing, rhonchi or rales. Abdominal:      General: Abdomen is flat. There is no distension. Palpations: Abdomen is soft. Tenderness: There is no abdominal tenderness. Musculoskeletal:      Cervical back: Neck supple. Lymphadenopathy:      Upper Body:      Right upper body: No axillary adenopathy. Left upper body: No axillary adenopathy. Neurological:      General: No focal deficit present. Mental Status: She is alert. Skin:     General: Skin is warm and dry. Psychiatric:         Mood and Affect: Mood normal.         Behavior: Behavior normal.   Vitals reviewed.

## 2023-07-05 LAB
HPV HR 12 DNA CVX QL NAA+PROBE: NEGATIVE
HPV16 DNA CVX QL NAA+PROBE: NEGATIVE
HPV18 DNA CVX QL NAA+PROBE: NEGATIVE

## 2023-07-10 ENCOUNTER — HOSPITAL ENCOUNTER (OUTPATIENT)
Dept: ULTRASOUND IMAGING | Facility: HOSPITAL | Age: 58
Discharge: HOME | End: 2023-07-10
Payer: COMMERCIAL

## 2023-07-10 DIAGNOSIS — R19.03 PELVIC SWELLING, RLQ: ICD-10-CM

## 2023-07-10 DIAGNOSIS — R63.4 UNINTENTIONAL WEIGHT LOSS: ICD-10-CM

## 2023-07-10 LAB
LAB AP GYN PRIMARY INTERPRETATION: NORMAL
Lab: NORMAL

## 2023-07-10 PROCEDURE — 76856 US EXAM PELVIC COMPLETE: CPT

## 2023-07-10 PROCEDURE — 76830 TRANSVAGINAL US NON-OB: CPT

## 2023-07-18 ENCOUNTER — OFFICE VISIT (OUTPATIENT)
Dept: GASTROENTEROLOGY | Facility: AMBULARY SURGERY CENTER | Age: 58
End: 2023-07-18
Payer: COMMERCIAL

## 2023-07-18 ENCOUNTER — TELEPHONE (OUTPATIENT)
Dept: GASTROENTEROLOGY | Facility: AMBULARY SURGERY CENTER | Age: 58
End: 2023-07-18

## 2023-07-18 VITALS
WEIGHT: 90.4 LBS | HEIGHT: 62 IN | HEART RATE: 84 BPM | BODY MASS INDEX: 16.63 KG/M2 | RESPIRATION RATE: 18 BRPM | DIASTOLIC BLOOD PRESSURE: 76 MMHG | OXYGEN SATURATION: 100 % | SYSTOLIC BLOOD PRESSURE: 122 MMHG

## 2023-07-18 DIAGNOSIS — Z80.0 FAMILY HISTORY OF COLON CANCER: ICD-10-CM

## 2023-07-18 DIAGNOSIS — K59.09 OTHER CONSTIPATION: ICD-10-CM

## 2023-07-18 DIAGNOSIS — R63.4 WEIGHT LOSS: Primary | ICD-10-CM

## 2023-07-18 DIAGNOSIS — Z12.11 SCREENING FOR COLON CANCER: ICD-10-CM

## 2023-07-18 DIAGNOSIS — R63.6 UNDERWEIGHT: ICD-10-CM

## 2023-07-18 PROCEDURE — 99204 OFFICE O/P NEW MOD 45 MIN: CPT | Performed by: INTERNAL MEDICINE

## 2023-07-18 RX ORDER — MULTIVITAMIN WITH IRON
50 TABLET ORAL DAILY
COMMUNITY

## 2023-07-18 NOTE — PROGRESS NOTES
Consultation - [facility]) [facility]  Margo Suh 65 y.o. female MRN: [id number]          Assessment & Plan:  60-year-old female with unintentional weight loss, family history of colon cancer. 1.  Unintentional weight loss: Unclear etiology, imaging studies and laboratory studies recently have been normal  -We will check celiac panel  -Continue with dietary modification with multiple meals throughout the day  -Proceed with EGD and colonoscopy to exclude luminal pathology -can consider CAT scan though her ultrasound and MRI have been normal    2. Family history of colon cancer:  -We will schedule patient for colonoscopy at this time for the above reasons  -Discussed with the risks of procedure including bleeding, surgery, perforation, missed polyp detection rate    Suzette Kulkarni was seen today for weight loss. Diagnoses and all orders for this visit:    Weight loss  Comments:  Pt stable on exam - unclear etiology. Recent TSH normal; UTD with GYN exam, mammo. Seeing [facility] - strongly recommended she do proposed testing. Orders:  -     Ambulatory Referral to Gastroenterology  -     Celiac Disease Antibody Profile; Future  -     Colonoscopy; Future  -     EGD; Future    Underweight  Comments:  As above. Pt to supplement meals with OTC Boost, etc. Seeing Nutritionist. Pt could not decide if she wanted to continue with [facility] - also referred to [address] Orders:  -     Ambulatory Referral to Gastroenterology  -     Celiac Disease Antibody Profile; Future    Screening for colon cancer  Comments:  Liliana given complaints above, famhx, & timing (last colonoscopy in 2018), proposed testing that she has planned (CT / EGD / Colonoscopy) strongly recommended. Orders:  -     Ambulatory Referral to Gastroenterology    Family history of colon cancer  -     Ambulatory Referral to Gastroenterology  -     Colonoscopy;  Future    Other constipation    Other orders  -     Diet NPO; Sips with meds; Standing  -     Void on call to OR; Standing            _____________________________________________________________        CC: Weight loss    HPI:  Tika Ledezma is a 65 y. o.female who was referred for evaluation of unintentional weight loss. This is a pleasant otherwise healthy 81-year-old female, reports she is lost about 10 pounds, normally with about 100 pounds she went down to approximately 6 pounds. Unintentional about 6 or 7 months ago had evaluation at another facility which is unremarkable, has been trying to increase her dietary intake eating about 7 small meals throughout the course of the day. She had intermittent alternating diarrhea and constipation for many years, intermittent bouts of epigastric pain but no persistent GI symptoms. She had a normal ultrasound, some lesions were seen in her liver, MRI confirmed that these were hemangiomas. Laboratory studies were normal except for mildly low potassium. TSH in January was normal.  She is been trying to gain weight recently, after seeing somebody about her symptoms, she was on a gluten-free and dairy free diet. An EGD and colonoscopy 2018 was normal.  Has a family history of colon cancer in her father, mother had multiple endocrine neoplasia type I with multiple malignancies. Surgical history is notable for tonsillectomy, D&Cs. Also hernia repair. Quit smoking, denies any alcohol. Complains of joint pains, arthralgias. ROS:  The remainder of the ROS was negative except for the pertinent positives mentioned in HPI.          Allergies: Sulfa antibiotics, Neomycin, Penicillins, Azithromycin, and Levaquin [levofloxacin]    Medications:   Current Outpatient Medications:   •  D-MANNOSE PO, Take by mouth, Disp: , Rfl:   •  NON FORMULARY, Femdophilus, Disp: , Rfl:   •  NON FORMULARY, Take 1 tablet by mouth in the morning Natures balance fruit and vegies, Disp: , Rfl:   •  Probiotic Product (ULTRAFLORA [employer] [facility] PO), Take by mouth, Disp: , Rfl:   • vitamin B-12 (CYANOCOBALAMIN) 50 MCG tablet, Take 50 mcg by mouth daily, Disp: , Rfl: '    Past Medical History:   Diagnosis Date   • Allergic    • Degenerative tear of left medial meniscus    • GERD (gastroesophageal reflux disease)    • Hypertension     usually low but spikes   • Lactose intolerance    • Miscarriage 1999, 2000   • Urinary tract infection comes and goes blood in urine   • Visual impairment posterior vitreous detachment    2020       Past Surgical History:   Procedure Laterality Date   • BLADDER SURGERY     • COLONOSCOPY     • DILATION AND CURETTAGE OF UTERUS      Miscarriage x2   • HERNIA REPAIR      inguinal, left   • [place] COLONOSCOPY FLX DX W/COLLJ SPEC WHEN PFRMD N/A 09/12/2018    Procedure: EGD AND COLONOSCOPY;  Surgeon: Allen Sanders MD;  [facility]; Service: Gastroenterology   • TONSILLECTOMY     • UPPER GASTROINTESTINAL ENDOSCOPY         Family History   Problem Relation Age of Onset   • Hyperlipidemia Mother    • Hypertension Mother    • Liver disease Mother    • Pancreatic cancer Mother    • Mental illness Mother    • Cancer Mother         Pancreas & MEN-1   • Stroke Mother         mom had mini stroke   • Colon cancer Father    • Hypertension Brother    • Alcohol abuse Brother    • Mental illness Brother    • ADD / ADHD Brother    • OCD Brother    • Heart disease Maternal Grandmother    • Dementia Maternal Grandmother    • Arthritis Maternal Grandmother    • Heart disease Maternal Grandfather    • Skin cancer Paternal Grandmother    • Breast cancer Paternal Grandmother         skin,bone & breast   • Kidney disease Paternal Grandfather    • Heart disease Maternal Uncle    • Diabetes Maternal Uncle    • Autoimmune disease Paternal Aunt         MS   • Autoimmune disease Paternal Aunt         Lupus   • Substance Abuse Neg Hx    • Depression Neg Hx         reports that she quit smoking about 36 years ago. Her smoking use included cigarettes. She started smoking about 43 years ago.  She has a 8.00 pack-year smoking history. She has never been exposed to tobacco smoke. She has never used smokeless tobacco. She reports that she does not currently use drugs after having used the following drugs: Marijuana. She reports that she does not drink alcohol.           Physical Exam:     /76 (BP Location: Left arm, Patient Position: Sitting, Cuff Size: Standard)   Pulse 84   Resp 18   Ht 5' 2" (1.575 m)   Wt 41 kg (90 lb 6.4 oz)   LMP 02/27/2021 (Approximate)   SpO2 100%   BMI 16.53 kg/m²     Gen: wn/wd, NAD, thin female  HEENT: anicteric, MMM, no cervical LAD  CVS: RRR, no m/r/g  CHEST: CTA b/l  ABD: +BS, soft, NT,ND, no hepatosplenomegaly  EXT: no c/c/e  NEURO: aaox3  SKIN: NO rashes

## 2023-07-18 NOTE — TELEPHONE ENCOUNTER
[address] [facility],[address] Assessment    Name: Lizett Dale  YOB: 1965  Last Height: 5' 2" (1.575 m)  Last weight: 41 kg (90 lb 6.4 oz)  BMI: 16.53 kg/m²  Procedure: EGD / Colon   Diagnosis:   Date of procedure: 07/18/2023  Prep: Miralax/ Dulcolax   Responsible :   Phone#:   Name completing form: Louis Escort  Date form completed: 07/18/23      If the patient answers yes to any of these questions, schedule in a hospital  Are you pregnant: No  Do you rely on a wheelchair for mobility: No  Have you been diagnosed with End Stage Renal Disease (ESRD): No  Do you need oxygen during the day: No  Have you had a heart attack or stroke within the past three months: No  Have you had a seizure within the past three months: No  Have you ever been informed by anesthesia that you have a difficult airway: No  Additional Questions  Have you had any cardiac testing or are under the care of a Cardiologist (see cardiac list): No  Cardiac list:   Do you have an implanted cardiac defibrillator: No (Comment:  This patient should be scheduled in the hospital)    Have any bleeding problems, such as anemia or hemophilia (If patient has H&H result below 8, schedule in hospital.  H&H must be within 30 days of procedure): No    Had an organ transplant within the past 3 months: No    Do you have any present infections: No  Do you get short of breath when walking a few blocks: No  Have you been diagnosed with diabetes: No  Comments (provide cardiac provider information if applicable):

## 2023-07-18 NOTE — LETTER
July 18, 2023     Reggie Pineda, [address] [place] [address]  [address] [address] [facility] [address] [address]    Patient: Colton Stroud   YOB: 1965   Date of Visit: 7/18/2023       Dear Dr. Beauty Holstein:    Thank you for referring Tisha Dunn to me for evaluation. Below are my notes for this consultation. If you have questions, please do not hesitate to call me. I look forward to following your patient along with you. Sincerely,        Kiki Norton MD        CC: No Recipients    Kiki Norton MD  7/18/2023 10:49 AM  Sign when Signing Visit  Consultation - [phone]  [facility]  Colton Stroud 65 y.o. female MRN: [id number]          Assessment & Plan:  61-year-old female with unintentional weight loss, family history of colon cancer. 1.  Unintentional weight loss: Unclear etiology, imaging studies and laboratory studies recently have been normal  -We will check celiac panel  -Continue with dietary modification with multiple meals throughout the day  -Proceed with EGD and colonoscopy to exclude luminal pathology -can consider CAT scan though her ultrasound and MRI have been normal    2. Family history of colon cancer:  -We will schedule patient for colonoscopy at this time for the above reasons  -Discussed with the risks of procedure including bleeding, surgery, perforation, missed polyp detection rate    Alberta Wilson was seen today for weight loss. Diagnoses and all orders for this visit:    Weight loss  Comments:  Pt stable on exam - unclear etiology. Recent TSH normal; UTD with GYN exam, mammo. Seeing [facility] - strongly recommended she do proposed testing. Orders:  -     Ambulatory Referral to Gastroenterology  -     Celiac Disease Antibody Profile; Future  -     Colonoscopy; Future  -     EGD; Future    Underweight  Comments:  As above. Pt to supplement meals with OTC Boost, etc. Seeing Nutritionist. Pt could not decide if she wanted to continue with [facility] - also referred to [address]   Orders:  - Ambulatory Referral to Gastroenterology  -     Celiac Disease Antibody Profile; Future    Screening for colon cancer  Comments:  Liliana given complaints above, famhx, & timing (last colonoscopy in 2018), proposed testing that she has planned (CT / EGD / Colonoscopy) strongly recommended. Orders:  -     Ambulatory Referral to Gastroenterology    Family history of colon cancer  -     Ambulatory Referral to Gastroenterology  -     Colonoscopy; Future    Other constipation    Other orders  -     Diet NPO; Sips with meds; Standing  -     Void on call to OR; Standing            _____________________________________________________________        CC: Weight loss    HPI:  Cheyenne Beal is a 65 y. o.female who was referred for evaluation of unintentional weight loss. This is a pleasant otherwise healthy 52-year-old female, reports she is lost about 10 pounds, normally with about 100 pounds she went down to approximately 6 pounds. Unintentional about 6 or 7 months ago had evaluation at another facility which is unremarkable, has been trying to increase her dietary intake eating about 7 small meals throughout the course of the day. She had intermittent alternating diarrhea and constipation for many years, intermittent bouts of epigastric pain but no persistent GI symptoms. She had a normal ultrasound, some lesions were seen in her liver, MRI confirmed that these were hemangiomas. Laboratory studies were normal except for mildly low potassium. TSH in January was normal.  She is been trying to gain weight recently, after seeing somebody about her symptoms, she was on a gluten-free and dairy free diet. An EGD and colonoscopy 2018 was normal.  Has a family history of colon cancer in her father, mother had multiple endocrine neoplasia type I with multiple malignancies. Surgical history is notable for tonsillectomy, D&Cs. Also hernia repair. Quit smoking, denies any alcohol.     Complains of joint pains, arthralgias. ROS:  The remainder of the ROS was negative except for the pertinent positives mentioned in HPI. Allergies: Sulfa antibiotics, Neomycin, Penicillins, Azithromycin, and Levaquin [levofloxacin]    Medications:   Current Outpatient Medications:   •  D-MANNOSE PO, Take by mouth, Disp: , Rfl:   •  NON FORMULARY, Femdophilus, Disp: , Rfl:   •  NON FORMULARY, Take 1 tablet by mouth in the morning Natures balance fruit and vegies, Disp: , Rfl:   •  Probiotic Product ([employer] IMMUNE HEALTH PO), Take by mouth, Disp: , Rfl:   •  vitamin B-12 (CYANOCOBALAMIN) 50 MCG tablet, Take 50 mcg by mouth daily, Disp: , Rfl: '    Past Medical History:   Diagnosis Date   • Allergic    • Degenerative tear of left medial meniscus    • GERD (gastroesophageal reflux disease)    • Hypertension     usually low but spikes   • Lactose intolerance    • Miscarriage 1999, 2000   • Urinary tract infection comes and goes blood in urine   • Visual impairment posterior vitreous detachment    2020       Past Surgical History:   Procedure Laterality Date   • BLADDER SURGERY     • COLONOSCOPY     • DILATION AND CURETTAGE OF UTERUS      Miscarriage x2   • HERNIA REPAIR      inguinal, left   • [place] COLONOSCOPY FLX DX W/COLLJ SPEC WHEN PFRMD N/A 09/12/2018    Procedure: EGD AND COLONOSCOPY;  Surgeon: Yamilex Barfield MD;  [facility];   Service: Gastroenterology   • TONSILLECTOMY     • UPPER GASTROINTESTINAL ENDOSCOPY         Family History   Problem Relation Age of Onset   • Hyperlipidemia Mother    • Hypertension Mother    • Liver disease Mother    • Pancreatic cancer Mother    • Mental illness Mother    • Cancer Mother         Pancreas & MEN-1   • Stroke Mother         mom had mini stroke   • Colon cancer Father    • Hypertension Brother    • Alcohol abuse Brother    • Mental illness Brother    • ADD / ADHD Brother    • OCD Brother    • Heart disease Maternal Grandmother    • Dementia Maternal Grandmother    • Arthritis Maternal Grandmother    • Heart disease Maternal Grandfather    • Skin cancer Paternal Grandmother    • Breast cancer Paternal Grandmother         skin,bone & breast   • Kidney disease Paternal Grandfather    • Heart disease Maternal Uncle    • Diabetes Maternal Uncle    • Autoimmune disease Paternal Aunt         MS   • Autoimmune disease Paternal Aunt         Lupus   • Substance Abuse Neg Hx    • Depression Neg Hx         reports that she quit smoking about 36 years ago. Her smoking use included cigarettes. She started smoking about 43 years ago. She has a 8.00 pack-year smoking history. She has never been exposed to tobacco smoke. She has never used smokeless tobacco. She reports that she does not currently use drugs after having used the following drugs: Marijuana. She reports that she does not drink alcohol.           Physical Exam:     /76 (BP Location: Left arm, Patient Position: Sitting, Cuff Size: Standard)   Pulse 84   Resp 18   Ht 5' 2" (1.575 m)   Wt 41 kg (90 lb 6.4 oz)   LMP 02/27/2021 (Approximate)   SpO2 100%   BMI 16.53 kg/m²     Gen: wn/wd, NAD, thin female  HEENT: anicteric, MMM, no cervical LAD  CVS: RRR, no m/r/g  CHEST: CTA b/l  ABD: +BS, soft, NT,ND, no hepatosplenomegaly  EXT: no c/c/e  NEURO: aaox3  SKIN: NO rashes

## 2023-07-18 NOTE — TELEPHONE ENCOUNTER
Scheduled date of EGD/colonoscopy (as of today): 09/07/2023  Physician performing EGD/colonoscopy: DR Brent Pink   Location of EGD/colonoscopy: an [address] [place] [address],[address]   Desired bowel prep reviewed with patient: Miralax   Instructions reviewed with patient by: arsalan   Clearances:  N/a

## 2023-08-11 ENCOUNTER — OFFICE VISIT (OUTPATIENT)
Dept: FAMILY MEDICINE CLINIC | Facility: CLINIC | Age: 58
End: 2023-08-11
Payer: COMMERCIAL

## 2023-08-11 VITALS
SYSTOLIC BLOOD PRESSURE: 138 MMHG | HEART RATE: 90 BPM | BODY MASS INDEX: 16.89 KG/M2 | HEIGHT: 62 IN | TEMPERATURE: 99 F | WEIGHT: 91.8 LBS | DIASTOLIC BLOOD PRESSURE: 84 MMHG | RESPIRATION RATE: 16 BRPM | OXYGEN SATURATION: 99 %

## 2023-08-11 DIAGNOSIS — Z11.59 NEED FOR HEPATITIS C SCREENING TEST: ICD-10-CM

## 2023-08-11 DIAGNOSIS — R63.6 UNDERWEIGHT: ICD-10-CM

## 2023-08-11 DIAGNOSIS — R63.4 WEIGHT LOSS: ICD-10-CM

## 2023-08-11 DIAGNOSIS — Z11.4 ENCOUNTER FOR SCREENING FOR HIV: ICD-10-CM

## 2023-08-11 DIAGNOSIS — E04.0 GOITER DIFFUSE: Primary | ICD-10-CM

## 2023-08-11 PROCEDURE — 99213 OFFICE O/P EST LOW 20 MIN: CPT | Performed by: FAMILY MEDICINE

## 2023-08-11 NOTE — PROGRESS NOTES
FAMILY PRACTICE OFFICE VISIT       NAME: Teresa Maier  AGE: 65 y.o. SEX: female       : 1965        MRN: [id number]    DATE: 2023  TIME: 9:05 AM    Assessment and Plan   1. Goiter diffuse  Comments:  Overall reassuring exam, but with hx, will get US, and labs. Orders:  -     US thyroid; Future; Expected date: 2023  -     TSH, 3rd generation with Free T4 reflex; Future  -     T3, free; Future    2. Weight loss  Comments:  Weight in recent months stabilizing after initial unexplained loss. Has had full w/u so far, incl a normal TSH in 2023 - [place]/EGD pending with GI.    3. Underweight  Comments:  As above. Continue smaller, more frequent meals, OTC Boost/Ensure meal supplement, etc.  Colonoscopy/EGD planned for 2023 with GI.    4. Encounter for screening for HIV  -     HIV 1/2 AG/AB w Reflex [facility] for 2 yr old and above; Future    5. Need for hepatitis C screening test  -     Hepatitis C antibody; Future         There are no Patient Instructions on file for this visit. Chief Complaint     Chief Complaint   Patient presents with   • Thyroid Problem     Pt c/o soreness in the neck, mostly on the L side, but at times the R bothers her as well. Pain with neck flexion. Denies dysphagia. Feels like there is a lump in her throat. Would like a new referral to Endo, states endo said her dx on previous referral is not a concern and PCP would need to put in a new referral.    • HM     Hep C and HIV screens pending. History of Present Illness   Teresa Maier is a 65y.o.-year-old female who presents with possible anterior neck swelling, some issues swallowing, spouse reporting to her that he feels that the area looks "swollen" - past hx of reported thyroid nodules. No hx of need for bx. Has EGD and Colonoscopy pending - has gained a little weight! Review of Systems   Review of Systems   Constitutional: Negative for activity change. HENT: Positive for trouble swallowing. Musculoskeletal:        ?Swelling of the lower anterior neck? Active Problem List     Patient Active Problem List   Diagnosis   • Copper deficiency   • Dystonia   • Endometriosis   • Idiopathic peripheral neuropathy   • Malabsorption syndrome   • Mild vitamin D deficiency   • TACS (trigeminal autonomic cephalgias)   • Small fiber neuropathy   • Other hyperlipidemia   • Constipation   • Coccygeal pain   • Costochondritis   • Seasonal allergic rhinitis due to pollen   • GERD (gastroesophageal reflux disease)   • Positive MARYELLEN (antinuclear antibody)   • Anxiety   • Liver cyst   • Vitamin B12 deficiency   • Ulnar neuropathy of right upper extremity   • Other insomnia   • Underweight   • Hearing loss   • RLS (restless legs syndrome)   • Weight loss   • Vasovagal episode         Past Medical History:  Past Medical History:   Diagnosis Date   • Allergic    • Degenerative tear of left medial meniscus    • GERD (gastroesophageal reflux disease)    • Hypertension     usually low but spikes   • Lactose intolerance    • Miscarriage 1999, 2000   • Urinary tract infection comes and goes blood in urine   • Visual impairment posterior vitreous detachment    2020       Past Surgical History:  Past Surgical History:   Procedure Laterality Date   • BLADDER SURGERY     • COLONOSCOPY     • DILATION AND CURETTAGE OF UTERUS      Miscarriage x2   • HERNIA REPAIR      inguinal, left   • [place] COLONOSCOPY FLX DX W/COLLJ SPEC WHEN PFRMD N/A 09/12/2018    Procedure: EGD AND COLONOSCOPY;  Surgeon: Bronwyn Javier MD;  Location: [facility];   Service: Gastroenterology   • TONSILLECTOMY     • UPPER GASTROINTESTINAL ENDOSCOPY         Family History:  Family History   Problem Relation Age of Onset   • Hyperlipidemia Mother    • Hypertension Mother    • Liver disease Mother    • Pancreatic cancer Mother    • Mental illness Mother    • Cancer Mother         Pancreas & MEN-1   • Stroke Mother         mom had mini stroke   • Colon cancer Father    • Hypertension Brother    • Alcohol abuse Brother    • Mental illness Brother    • ADD / ADHD Brother    • OCD Brother    • Heart disease Maternal Grandmother    • Dementia Maternal Grandmother    • Arthritis Maternal Grandmother    • Heart disease Maternal Grandfather    • Skin cancer Paternal Grandmother    • Breast cancer Paternal Grandmother         skin,bone & breast   • Kidney disease Paternal Grandfather    • Heart disease Maternal Uncle    • Diabetes Maternal Uncle    • Autoimmune disease Paternal Aunt         MS   • Autoimmune disease Paternal Aunt         Lupus   • Substance Abuse Neg Hx    • Depression Neg Hx        Social History:  Social History     Socioeconomic History   • Marital status: /Civil Union     Spouse name: Not on file   • Number of children: Not on file   • Years of education: Not on file   • Highest education level: Not on file   Occupational History   • Not on file   Tobacco Use   • Smoking status: Former     Packs/day: 1.00     Years: 8.00     Total pack years: 8.00     Types: Cigarettes     Start date: 1980     Quit date: 1987     Years since quittin.6     Passive exposure: Never   • Smokeless tobacco: Never   • Tobacco comments:     off and on not constant when I was young   Vaping Use   • Vaping Use: Former   Substance and Sexual Activity   • Alcohol use: No   • Drug use: Not Currently     Types: Marijuana   • Sexual activity: Yes     Partners: Male     Birth control/protection: Condom Male, Post-menopausal   Other Topics Concern   • Not on file   Social History Narrative    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         Social Determinants of Health     Financial Resource Strain: Not on file   Food Insecurity: Not on file   Transportation Needs: Not on file   Physical Activity: Not on file   Stress: Not on file   Social Connections: Not on file   Intimate Partner Violence: Not on file   Housing Stability: Not on file       Objective     Vitals:    23 0852   BP: 138/84   Pulse: 90   Resp: 16   Temp: 99 °F (37.2 °C)   SpO2: 99%     Wt Readings from Last 3 Encounters:   08/11/23 41.6 kg (91 lb 12.8 oz)   07/18/23 41 kg (90 lb 6.4 oz)   07/03/23 41.7 kg (92 lb)       Physical Exam  Vitals and nursing note reviewed. Constitutional:       General: She is not in acute distress. Appearance: Normal appearance. She is not ill-appearing, toxic-appearing or diaphoretic. HENT:      Head: Normocephalic and atraumatic. Mouth/Throat:      Mouth: Mucous membranes are moist.      Pharynx: Oropharynx is clear. No oropharyngeal exudate or posterior oropharyngeal erythema. Eyes:      General: No scleral icterus. Conjunctiva/sclera: Conjunctivae normal.   Neck:      Thyroid: No thyroid mass, thyromegaly or thyroid tenderness. Musculoskeletal:      Cervical back: Normal range of motion and neck supple. No rigidity or tenderness. Lymphadenopathy:      Cervical: No cervical adenopathy. Neurological:      Mental Status: She is alert and oriented to person, place, and time. Psychiatric:         Mood and Affect: Mood is anxious. Affect is not inappropriate. Speech: Speech is rapid and pressured. Behavior: Behavior normal.         Thought Content:  Thought content normal.         Judgment: Judgment normal.         Pertinent Laboratory/Diagnostic Studies:  Lab Results   Component Value Date    BUN 14 06/23/2023    CREATININE 0.83 06/23/2023    CALCIUM 9.3 06/23/2023    K 3.4 (L) 06/23/2023    CO2 30 06/23/2023     06/23/2023     Lab Results   Component Value Date    ALT 18 06/23/2023    AST 18 06/23/2023    ALKPHOS 89 06/23/2023       Lab Results   Component Value Date    WBC 4.53 06/23/2023    HGB 14.4 06/23/2023    HCT 43.6 06/23/2023    MCV 92 06/23/2023     06/23/2023       No results found for: "TSH"    No results found for: "CHOL"  Lab Results   Component Value Date    TRIG 67 06/23/2023     Lab Results   Component Value Date HDL 82 06/23/2023     Lab Results   Component Value Date    LDLCALC 141 (H) 06/23/2023     Lab Results   Component Value Date    HGBA1C 5.3 07/01/2022       Results for orders placed or performed in visit on 07/03/23   HPV High Risk    Specimen: Thin-Prep Vial   Result Value Ref Range    HPV Other HR Negative Negative    HPV16 Negative Negative    HPV18 Negative Negative   Liquid-based pap, screening   Result Value Ref Range    Case Report       Gynecologic Cytology Report                       Case: [id number]                                  Authorizing Provider:  Flora Mooney PA-C        Collected:           07/03/2023 1123              Ordering Location:     [facility]  Received:            07/03/2023 1123                                     Health                                                                       First Screen:          Jerad Melchor                                                                  Rescreen:              ADRIÁN Reina                                                       Specimen:    LIQUID-BASED PAP, SCREENING, Cervix, Endocervical                                          Primary Interpretation Negative for intraepithelial lesion or malignancy     Specimen Adequacy       Satisfactory for evaluation. Endocervical/transformation zone component present. Additional Information       [employer]'s FDA approved ,  and ThinPrep Imaging Duo System are utilized with strict adherence to the 's instruction manual to prepare gynecologic and non-gynecologic cytology specimens for the production of ThinPrep slides as well as for gynecologic ThinPrep imaging. These processes have been validated by our laboratory and/or by the . The Pap test is not a diagnostic procedure and should not be used as the sole means to detect cervical cancer. It is only a screening procedure to aid in the detection of cervical cancer and its precursors. Both false-negative and false-positive results have been experienced. Your patient's test result should be interpreted in this context together with the history and clinical findings. Orders Placed This Encounter   Procedures   • US thyroid   • TSH, 3rd generation with Free T4 reflex   • T3, free   • HIV 1/2 AG/AB w Reflex [facility] for 2 yr old and above   • Hepatitis C antibody       ALLERGIES:  Allergies   Allergen Reactions   • Sulfa Antibiotics Hives   • Neomycin Hives   • Penicillins      Reaction Date: 12Nov2009;    • Azithromycin Rash   • Levaquin [Levofloxacin] Tachycardia       Current Medications     Current Outpatient Medications   Medication Sig Dispense Refill   • D-MANNOSE PO Take by mouth     • NON FORMULARY Femdophilus     • NON FORMULARY Take 1 tablet by mouth in the morning Natures balance fruit and vegies     • Probiotic Product (ULTRAFLORA IMMUNE HEALTH PO) Take by mouth     • vitamin B-12 (CYANOCOBALAMIN) 50 MCG tablet Take 50 mcg by mouth daily       No current facility-administered medications for this visit.          Health Maintenance     Health Maintenance   Topic Date Due   • Hepatitis C Screening  Never done   • HIV Screening  Never done   • DTaP,Tdap,and Td Vaccines (1 - Tdap) Never done   • COVID-19 Vaccine (3 - Moderna series) 10/22/2021   • PT PLAN OF CARE  06/04/2023   • BMI: Followup Plan  08/01/2023   • Influenza Vaccine (1) 09/01/2023   • Colorectal Cancer Screening  09/12/2023   • Breast Cancer Screening: Mammogram  05/12/2024   • Annual Physical  07/03/2024   • BMI: Adult  07/18/2024   • Depression Screening  08/11/2024   • Cervical Cancer Screening  07/03/2028   • Pneumococcal Vaccine: Pediatrics (0 to 5 Years) and At-Risk Patients (6 to 59 Years)  Aged Out   • HIB Vaccine  Aged Out   • IPV Vaccine  Aged Out   • Hepatitis A Vaccine  Aged Out   • Meningococcal ACWY Vaccine  Aged Out   • HPV Vaccine  Aged [employer] History   Administered Date(s) Administered   • COVID-19 MODERNA VACC 0.5 ML IM 07/30/2021, 08/27/2021          Basilio Romo DO

## 2023-08-12 ENCOUNTER — APPOINTMENT (OUTPATIENT)
Dept: LAB | Facility: CLINIC | Age: 58
End: 2023-08-12
Payer: COMMERCIAL

## 2023-08-12 DIAGNOSIS — Z11.4 ENCOUNTER FOR SCREENING FOR HIV: ICD-10-CM

## 2023-08-12 DIAGNOSIS — E04.0 GOITER DIFFUSE: ICD-10-CM

## 2023-08-12 DIAGNOSIS — Z11.59 NEED FOR HEPATITIS C SCREENING TEST: ICD-10-CM

## 2023-08-12 LAB
T3FREE SERPL-MCNC: 3.22 PG/ML (ref 2.5–3.9)
TSH SERPL DL<=0.05 MIU/L-ACNC: 1.27 UIU/ML (ref 0.45–4.5)

## 2023-08-12 PROCEDURE — 36415 COLL VENOUS BLD VENIPUNCTURE: CPT

## 2023-08-12 PROCEDURE — 84481 FREE ASSAY (FT-3): CPT

## 2023-08-12 PROCEDURE — 84443 ASSAY THYROID STIM HORMONE: CPT

## 2023-08-12 PROCEDURE — 86803 HEPATITIS C AB TEST: CPT

## 2023-08-12 PROCEDURE — 87389 HIV-1 AG W/HIV-1&-2 AB AG IA: CPT

## 2023-08-13 LAB — HCV AB SER QL: NORMAL

## 2023-08-14 LAB
HIV 1+2 AB+HIV1 P24 AG SERPL QL IA: NORMAL
HIV 2 AB SERPL QL IA: NORMAL
HIV1 AB SERPL QL IA: NORMAL
HIV1 P24 AG SERPL QL IA: NORMAL

## 2023-08-17 ENCOUNTER — HOSPITAL ENCOUNTER (OUTPATIENT)
Dept: ULTRASOUND IMAGING | Facility: HOSPITAL | Age: 58
End: 2023-08-17
Payer: COMMERCIAL

## 2023-08-17 DIAGNOSIS — E04.0 NONTOXIC DIFFUSE GOITER: ICD-10-CM

## 2023-08-17 PROCEDURE — 76536 US EXAM OF HEAD AND NECK: CPT

## 2023-09-13 ENCOUNTER — ANESTHESIA EVENT (OUTPATIENT)
Dept: GASTROENTEROLOGY | Facility: AMBULARY SURGERY CENTER | Age: 58
End: 2023-09-13

## 2023-09-13 ENCOUNTER — HOSPITAL ENCOUNTER (OUTPATIENT)
Dept: GASTROENTEROLOGY | Facility: AMBULARY SURGERY CENTER | Age: 58
Setting detail: OUTPATIENT SURGERY
Discharge: HOME | End: 2023-09-13
Attending: INTERNAL MEDICINE
Payer: COMMERCIAL

## 2023-09-13 ENCOUNTER — ANESTHESIA (OUTPATIENT)
Dept: GASTROENTEROLOGY | Facility: AMBULARY SURGERY CENTER | Age: 58
End: 2023-09-13

## 2023-09-13 VITALS
SYSTOLIC BLOOD PRESSURE: 118 MMHG | DIASTOLIC BLOOD PRESSURE: 58 MMHG | HEART RATE: 78 BPM | TEMPERATURE: 97.1 F | BODY MASS INDEX: 16.56 KG/M2 | WEIGHT: 90 LBS | RESPIRATION RATE: 18 BRPM | HEIGHT: 62 IN | OXYGEN SATURATION: 100 %

## 2023-09-13 DIAGNOSIS — R63.4 WEIGHT LOSS: ICD-10-CM

## 2023-09-13 DIAGNOSIS — Z80.0 FAMILY HISTORY OF COLON CANCER: ICD-10-CM

## 2023-09-13 PROCEDURE — 88342 IMHCHEM/IMCYTCHM 1ST ANTB: CPT | Performed by: STUDENT IN AN ORGANIZED HEALTH CARE EDUCATION/TRAINING PROGRAM

## 2023-09-13 PROCEDURE — 88305 TISSUE EXAM BY PATHOLOGIST: CPT | Performed by: STUDENT IN AN ORGANIZED HEALTH CARE EDUCATION/TRAINING PROGRAM

## 2023-09-13 RX ORDER — LIDOCAINE HYDROCHLORIDE 10 MG/ML
INJECTION, SOLUTION EPIDURAL; INFILTRATION; INTRACAUDAL; PERINEURAL AS NEEDED
Status: DISCONTINUED | OUTPATIENT
Start: 2023-09-13 | End: 2023-09-13

## 2023-09-13 RX ORDER — ONDANSETRON 2 MG/ML
4 INJECTION INTRAMUSCULAR; INTRAVENOUS EVERY 6 HOURS PRN
Status: DISCONTINUED | OUTPATIENT
Start: 2023-09-13 | End: 2023-09-17 | Stop reason: HOSPADM

## 2023-09-13 RX ORDER — SODIUM CHLORIDE, SODIUM LACTATE, POTASSIUM CHLORIDE, CALCIUM CHLORIDE 600; 310; 30; 20 MG/100ML; MG/100ML; MG/100ML; MG/100ML
INJECTION, SOLUTION INTRAVENOUS CONTINUOUS PRN
Status: DISCONTINUED | OUTPATIENT
Start: 2023-09-13 | End: 2023-09-13

## 2023-09-13 RX ORDER — PROPOFOL 10 MG/ML
INJECTION, EMULSION INTRAVENOUS AS NEEDED
Status: DISCONTINUED | OUTPATIENT
Start: 2023-09-13 | End: 2023-09-13

## 2023-09-13 RX ADMIN — PROPOFOL 50 MG: 10 INJECTION, EMULSION INTRAVENOUS at 10:59

## 2023-09-13 RX ADMIN — SODIUM CHLORIDE, SODIUM LACTATE, POTASSIUM CHLORIDE, AND CALCIUM CHLORIDE: .6; .31; .03; .02 INJECTION, SOLUTION INTRAVENOUS at 10:33

## 2023-09-13 RX ADMIN — PROPOFOL 40 MG: 10 INJECTION, EMULSION INTRAVENOUS at 10:40

## 2023-09-13 RX ADMIN — LIDOCAINE HYDROCHLORIDE 50 MG: 10 INJECTION, SOLUTION EPIDURAL; INFILTRATION; INTRACAUDAL; PERINEURAL at 10:38

## 2023-09-13 RX ADMIN — PROPOFOL 50 MG: 10 INJECTION, EMULSION INTRAVENOUS at 10:42

## 2023-09-13 RX ADMIN — PROPOFOL 50 MG: 10 INJECTION, EMULSION INTRAVENOUS at 10:45

## 2023-09-13 RX ADMIN — ONDANSETRON 4 MG: 2 INJECTION INTRAMUSCULAR; INTRAVENOUS at 08:18

## 2023-09-13 RX ADMIN — PROPOFOL 120 MG: 10 INJECTION, EMULSION INTRAVENOUS at 10:38

## 2023-09-13 RX ADMIN — SODIUM CHLORIDE, SODIUM LACTATE, POTASSIUM CHLORIDE, AND CALCIUM CHLORIDE: .6; .31; .03; .02 INJECTION, SOLUTION INTRAVENOUS at 07:30

## 2023-09-13 RX ADMIN — PROPOFOL 40 MG: 10 INJECTION, EMULSION INTRAVENOUS at 10:39

## 2023-09-13 NOTE — ANESTHESIA PREPROCEDURE EVALUATION
Procedure:  COLONOSCOPY  EGD    Relevant Problems   CARDIO   (+) Other hyperlipidemia      GI/HEPATIC   (+) GERD (gastroesophageal reflux disease)   (+) Liver cyst      MUSCULOSKELETAL   (+) Coccygeal pain      NEURO/PSYCH   (+) Anxiety   (+) TACS (trigeminal autonomic cephalgias)      Respiratory   (+) Seasonal allergic rhinitis due to pollen      Digestive   (+) Malabsorption syndrome      Nervous and Auditory   (+) Dystonia   (+) Hearing loss   (+) Idiopathic peripheral neuropathy   (+) Small fiber neuropathy   (+) Ulnar neuropathy of right upper extremity      Musculoskeletal and Integument   (+) Costochondritis      Other   (+) Constipation   (+) Copper deficiency   (+) Endometriosis   (+) Mild vitamin D deficiency   (+) Other insomnia   (+) Positive MARYELLEN (antinuclear antibody)   (+) RLS (restless legs syndrome)   (+) Underweight   (+) Vasovagal episode   (+) Vitamin B12 deficiency   (+) Weight loss      Lab Results   Component Value Date    SODIUM 139 06/23/2023    K 3.4 (L) 06/23/2023     06/23/2023    CO2 30 06/23/2023    AGAP 7 06/23/2023    BUN 14 06/23/2023    CREATININE 0.83 06/23/2023    GLUC 84 07/01/2022    GLUF 84 06/23/2023    CALCIUM 9.3 06/23/2023    AST 18 06/23/2023    ALT 18 06/23/2023    ALKPHOS 89 06/23/2023    TP 7.0 06/23/2023    TP 6.9 06/23/2023    TBILI 0.90 06/23/2023    EGFR 79 06/23/2023     Lab Results   Component Value Date    WBC 4.53 06/23/2023    HGB 14.4 06/23/2023    HCT 43.6 06/23/2023    MCV 92 06/23/2023     06/23/2023              Anesthesia Plan  ASA Score- 2     Anesthesia Type- IV sedation with anesthesia with ASA Monitors. Additional Monitors:   Airway Plan:           Plan Factors-Exercise tolerance (METS): >4 METS. Chart reviewed. EKG reviewed. Imaging results reviewed. Existing labs reviewed. Patient summary reviewed. Induction- intravenous.     Postoperative Plan-     Informed Consent-

## 2023-09-13 NOTE — ANESTHESIA POSTPROCEDURE EVALUATION
Post-Op Assessment Note    CV Status:  Stable  Pain Score: 0    Pain management: adequate     Mental Status:  Alert and awake   Hydration Status:  Euvolemic   PONV Controlled:  Controlled   Airway Patency:  Patent      Post Op Vitals Reviewed: Yes      Staff: CRNA         No notable events documented.     BP   96/53   Temp     Pulse 81   Resp 16   SpO2   99

## 2023-09-13 NOTE — H&P
History and Physical - [facility]  Jennifer Rodriguez 63 y.o. female MRN: [id number]    HPI: Jennifer Rodriguez is a 63y.o. year old female who presents with weight loss, family hx of colon cancer. Review of Systems    Historical Information   Past Medical History:   Diagnosis Date   • Allergic    • Degenerative tear of left medial meniscus    • GERD (gastroesophageal reflux disease)    • Hypertension     usually low but spikes   • Lactose intolerance    • Miscarriage ,    • Urinary tract infection comes and goes blood in urine   • Visual impairment posterior vitreous detachment         Past Surgical History:   Procedure Laterality Date   • BLADDER SURGERY     • COLONOSCOPY     • DILATION AND CURETTAGE OF UTERUS      Miscarriage x2   • HERNIA REPAIR      inguinal, left   • SD COLONOSCOPY FLX DX W/COLLJ SPEC WHEN PFRMD N/A 2018    Procedure: EGD AND COLONOSCOPY;  Surgeon: Sherman Simeon MD;  [facility];   Service: Gastroenterology   • TONSILLECTOMY     • UPPER GASTROINTESTINAL ENDOSCOPY       Social History   Social History     Substance and Sexual Activity   Alcohol Use No     Social History     Substance and Sexual Activity   Drug Use Not Currently   • Types: Marijuana     Social History     Tobacco Use   Smoking Status Former   • Packs/day: 1.00   • Years: 8.00   • Total pack years: 8.00   • Types: Cigarettes   • Start date: 1980   • Quit date: 1987   • Years since quittin.7   • Passive exposure: Never   Smokeless Tobacco Never   Tobacco Comments    off and on not constant when I was young     Family History   Problem Relation Age of Onset   • Hyperlipidemia Mother    • Hypertension Mother    • Liver disease Mother    • Pancreatic cancer Mother    • Mental illness Mother    • Cancer Mother         Pancreas & MEN-1   • Stroke Mother         mom had mini stroke   • Colon cancer Father    • Hypertension Brother    • Alcohol abuse Brother    • Mental illness Brother    • ADD / ADHD Brother    • OCD Brother    • Heart disease Maternal Grandmother    • Dementia Maternal Grandmother    • Arthritis Maternal Grandmother    • Heart disease Maternal Grandfather    • Skin cancer Paternal Grandmother    • Breast cancer Paternal Grandmother         skin,bone & breast   • Kidney disease Paternal Grandfather    • Heart disease Maternal Uncle    • Diabetes Maternal Uncle    • Autoimmune disease Paternal Aunt         MS   • Autoimmune disease Paternal Aunt         Lupus   • Substance Abuse Neg Hx    • Depression Neg Hx        Meds/Allergies     (Not in a hospital admission)      Allergies   Allergen Reactions   • Sulfa Antibiotics Hives   • Neomycin Hives   • Penicillins      Reaction Date: 12Nov2009;    • Azithromycin Rash   • Levaquin [Levofloxacin] Tachycardia       Objective     /60   Pulse 74   Temp (!) 97 °F (36.1 °C) (Temporal)   Resp 22   Ht 5' 2" (1.575 m)   Wt 40.8 kg (90 lb)   LMP 02/27/2021 (Approximate)   SpO2 100%   BMI 16.46 kg/m²       PHYSICAL EXAM    Gen: NAD  CV: RRR  CHEST: Clear  ABD: soft, NT/ND  EXT: no edema  Neuro: AAO      ASSESSMENT/PLAN:  This is a 63y.o. year old female here for weight loss, family hx of colon cancer.        PLAN:   Procedure: egd/colonoscopy

## 2023-09-18 PROCEDURE — 88305 TISSUE EXAM BY PATHOLOGIST: CPT | Performed by: STUDENT IN AN ORGANIZED HEALTH CARE EDUCATION/TRAINING PROGRAM

## 2023-09-18 PROCEDURE — 88342 IMHCHEM/IMCYTCHM 1ST ANTB: CPT | Performed by: STUDENT IN AN ORGANIZED HEALTH CARE EDUCATION/TRAINING PROGRAM

## 2023-09-19 DIAGNOSIS — K52.832 LYMPHOCYTIC COLITIS: Primary | ICD-10-CM

## 2023-09-19 RX ORDER — BUDESONIDE 3 MG/1
9 CAPSULE, COATED PELLETS ORAL DAILY
Qty: 90 CAPSULE | Refills: 3 | Status: SHIPPED | OUTPATIENT
Start: 2023-09-19 | End: 2023-10-17 | Stop reason: ALTCHOICE

## 2023-10-09 ENCOUNTER — TELEPHONE (OUTPATIENT)
Dept: NEUROLOGY | Facility: CLINIC | Age: 58
End: 2023-10-09

## 2023-10-09 NOTE — TELEPHONE ENCOUNTER
Pt called to r/s her 10/10 f/u appt with Dr. Cecilia Grande. No current openings. Added to wait list so we can call when we have an opening.

## 2023-10-15 PROBLEM — M15.0 PRIMARY GENERALIZED (OSTEO)ARTHRITIS: Status: ACTIVE | Noted: 2023-10-15

## 2023-10-15 PROBLEM — G47.9 SLEEP DISTURBANCE: Status: ACTIVE | Noted: 2023-10-15

## 2023-10-15 PROBLEM — M35.9 UNDIFFERENTIATED CONNECTIVE TISSUE DISEASE (HCC): Status: ACTIVE | Noted: 2023-10-15

## 2023-10-15 PROBLEM — K58.2 IRRITABLE BOWEL SYNDROME WITH BOTH CONSTIPATION AND DIARRHEA: Status: ACTIVE | Noted: 2023-10-15

## 2023-10-15 PROBLEM — G89.4 CHRONIC PAIN SYNDROME: Status: ACTIVE | Noted: 2023-10-15

## 2023-10-15 PROBLEM — M79.7 FIBROMYALGIA: Status: ACTIVE | Noted: 2023-10-15

## 2023-10-15 PROBLEM — R53.82 CHRONIC FATIGUE: Status: ACTIVE | Noted: 2023-10-15

## 2023-10-17 ENCOUNTER — OFFICE VISIT (OUTPATIENT)
Dept: FAMILY MEDICINE CLINIC | Facility: CLINIC | Age: 58
End: 2023-10-17
Payer: COMMERCIAL

## 2023-10-17 VITALS
HEIGHT: 63 IN | RESPIRATION RATE: 14 BRPM | WEIGHT: 93.6 LBS | OXYGEN SATURATION: 98 % | DIASTOLIC BLOOD PRESSURE: 80 MMHG | SYSTOLIC BLOOD PRESSURE: 136 MMHG | TEMPERATURE: 97.6 F | BODY MASS INDEX: 16.59 KG/M2 | HEART RATE: 83 BPM

## 2023-10-17 DIAGNOSIS — K52.832 LYMPHOCYTIC COLITIS: ICD-10-CM

## 2023-10-17 DIAGNOSIS — R63.4 WEIGHT LOSS: Primary | ICD-10-CM

## 2023-10-17 DIAGNOSIS — H04.123 DRY EYES: ICD-10-CM

## 2023-10-17 DIAGNOSIS — E78.49 OTHER HYPERLIPIDEMIA: ICD-10-CM

## 2023-10-17 DIAGNOSIS — R76.8 POSITIVE ANA (ANTINUCLEAR ANTIBODY): ICD-10-CM

## 2023-10-17 DIAGNOSIS — Z00.00 ANNUAL PHYSICAL EXAM: ICD-10-CM

## 2023-10-17 DIAGNOSIS — H57.89 EYE IRRITATION: ICD-10-CM

## 2023-10-17 DIAGNOSIS — H93.13 TINNITUS OF BOTH EARS: ICD-10-CM

## 2023-10-17 PROBLEM — M94.0 COSTOCHONDRITIS: Status: RESOLVED | Noted: 2018-05-31 | Resolved: 2023-10-17

## 2023-10-17 PROCEDURE — 99396 PREV VISIT EST AGE 40-64: CPT | Performed by: FAMILY MEDICINE

## 2023-10-17 PROCEDURE — 99213 OFFICE O/P EST LOW 20 MIN: CPT | Performed by: FAMILY MEDICINE

## 2023-10-17 NOTE — PROGRESS NOTES
[address] [facility]    NAME: Phoebe Galeazzi  AGE: 63 y.o. SEX: female  : 1965     DATE: 10/19/2023     Assessment and Plan:     Problem List Items Addressed This Visit       Other hyperlipidemia    Relevant Orders    Lipid Panel with Direct LDL reflex    Positive MARYELLEN (antinuclear antibody)     - Patient following with rheumatology. Work-up after positive MARYELLEN has been reassuring for autoimmune diseases. -Does note positive carbonic anhydrase IgM and IgA. -Is being sent for further lupus of eyes connective tissue disease panel and lupus anticoagulant.  -With multiple small fiber neuropathies and questionable fibromyalgia she has been referred for physical therapy.  -She is working on her food illumination/anti-inflammatory diet and reports already having improved bowel movements.  -Continue following with rheumatology as scheduled. Weight loss - Primary     - Weight is now stable. She is monitoring her diet much more specifically. Pain to increase her protein intake and stay away from foods that may exacerbate her diarrhea.  -Discussed referral to nutritionist for assistance. She would like to continue on her own but will consider in the future. Relevant Orders    Comprehensive metabolic panel    TSH, 3rd generation with Free T4 reflex    CBC and differential    Lymphocytic colitis     - Patient recently diagnosed with lymphocytic colitis on colonoscopy. Per GI she was supposed to start budesonide however she does not want to take it as she has had issues with in the past.  -Discussed following up with GI to review other possible choices such as mesalamine.  -Does admit since changing her diet and her diarrhea has improved. -Continue elimination diet.  -Continue to follow with GI as scheduled.          Tinnitus of both ears     - Patient complains of recurrent tenderness bilaterally.  -She does admit to seasonal allergies however is not congested or suffering at this point.  -Denies any noted hearing loss. Has never been evaluated for her tinnitus. -ENT referral placed         Relevant Orders    Ambulatory Referral to Otolaryngology     Other Visit Diagnoses       Dry eyes        Relevant Orders    Ambulatory Referral to Ophthalmology    Eye irritation        Relevant Orders    Ambulatory Referral to Ophthalmology    Annual physical exam                Immunizations and preventive care screenings were discussed with patient today. Appropriate education was printed on patient's after visit summary. Counseling:  Alcohol/drug use: discussed moderation in alcohol intake, the recommendations for healthy alcohol use, and avoidance of illicit drug use. Dental Health: discussed importance of regular tooth brushing, flossing, and dental visits. Injury prevention: discussed safety/seat belts, safety helmets, smoke detectors, carbon dioxide detectors, and smoking near bedding or upholstery. Sexual health: discussed sexually transmitted diseases, partner selection, use of condoms, avoidance of unintended pregnancy, and contraceptive alternatives. Exercise: the importance of regular exercise/physical activity was discussed. Recommend exercise 3-5 times per week for at least 30 minutes. Return in about 6 months (around 4/17/2024) for Next scheduled follow up. Chief Complaint:     Chief Complaint   Patient presents with    Physical Exam     Patient being seen for physical       History of Present Illness:     Adult Annual Physical   Patient here for a comprehensive physical exam. The patient reports problems - as above. .    Diet and Physical Activity  Diet/Nutrition: well balanced diet and working on elimination diet to avoid inflammation. .   Exercise: walking and 3-4 times a week on average.       Depression Screening  PHQ-2/9 Depression Screening           General Health  Sleep: sleeps well and gets 7-8 hours of sleep on average. Hearing:  Tinnitus, no change in hearing. .  Vision: goes for regular eye exams. Dental: regular dental visits and brushes teeth twice daily. /GYN Health  Patient is: postmenopausal  Last menstrual period:   Contraceptive method:  none . Advanced Care Planning  Do you have an advanced directive? no  Do you have a durable medical power of ? no     Review of Systems:     Review of Systems   Constitutional:  Negative for chills, fever and unexpected weight change. HENT:  Positive for tinnitus. Negative for congestion, ear pain, postnasal drip and sore throat. Eyes:  Negative for pain and visual disturbance. Respiratory:  Negative for cough and shortness of breath. Cardiovascular:  Negative for chest pain and palpitations. Gastrointestinal:  Negative for abdominal pain, constipation, diarrhea, nausea and vomiting. Genitourinary:  Negative for dysuria and hematuria. Musculoskeletal:  Positive for arthralgias and myalgias. Negative for back pain. Skin:  Negative for color change and rash. Neurological:  Negative for dizziness, seizures, syncope, weakness and headaches. All other systems reviewed and are negative.      Past Medical History:     Past Medical History:   Diagnosis Date    Allergic     Costochondritis 05/31/2018    Degenerative tear of left medial meniscus     Dystonia 04/28/2016    GERD (gastroesophageal reflux disease)     Hypertension     usually low but spikes    Lactose intolerance     Miscarriage 1999, 2000    Urinary tract infection comes and goes blood in urine    Visual impairment posterior vitreous detachment    2020      Past Surgical History:     Past Surgical History:   Procedure Laterality Date    BLADDER SURGERY      COLONOSCOPY      DILATION AND CURETTAGE OF UTERUS      Miscarriage x2    HERNIA REPAIR      inguinal, left    NM COLONOSCOPY FLX DX W/COLLJ SPEC WHEN PFRMD N/A 09/12/2018    Procedure: EGD AND COLONOSCOPY;  Surgeon: Mary Ann Mcdonnell Tessa Short MD;  [facility];   Service: Gastroenterology    TONSILLECTOMY      UPPER GASTROINTESTINAL ENDOSCOPY        Social History:     Social History     Socioeconomic History    Marital status: /Civil Union     Spouse name: None    Number of children: None    Years of education: None    Highest education level: None   Occupational History    None   Tobacco Use    Smoking status: Former     Packs/day: 1.00     Years: 8.00     Total pack years: 8.00     Types: Cigarettes     Start date: 1980     Quit date: 1987     Years since quittin.8     Passive exposure: Never    Smokeless tobacco: Never    Tobacco comments:     off and on not constant when I was young   Vaping Use    Vaping Use: Former   Substance and Sexual Activity    Alcohol use: No    Drug use: Not Currently     Types: Marijuana     Comment: teenager rare use marijuana    Sexual activity: Yes     Partners: Male     Birth control/protection: Condom Male, Post-menopausal   Other Topics Concern    None   Social History Narrative    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         Social Determinants of Health     Financial Resource Strain: Not on file   Food Insecurity: Not on file   Transportation Needs: Not on file   Physical Activity: Not on file   Stress: Not on file   Social Connections: Not on file   Intimate Partner Violence: Not on file   Housing Stability: Not on file      Family History:     Family History   Problem Relation Age of Onset    Hyperlipidemia Mother     Hypertension Mother     Liver disease Mother     Pancreatic cancer Mother     Mental illness Mother     Cancer Mother         Pancreas & MEN-1    Stroke Mother         mom had mini stroke    Colon cancer Father     Hypertension Brother     Alcohol abuse Brother     Mental illness Brother     ADD / ADHD Brother     OCD Brother     Heart disease Maternal Grandmother     Dementia Maternal Grandmother     Arthritis Maternal Grandmother     Heart disease Maternal Grandfather     Skin cancer Paternal Grandmother     Breast cancer Paternal Grandmother         skin,bone & breast    Kidney disease Paternal Grandfather     Heart disease Maternal Uncle     Diabetes Maternal Uncle     Autoimmune disease Paternal Aunt         MS    Autoimmune disease Paternal Aunt         Lupus    Substance Abuse Neg Hx     Depression Neg Hx       Current Medications:     Current Outpatient Medications   Medication Sig Dispense Refill    D-MANNOSE PO Take by mouth      NON FORMULARY Femdophilus      vitamin B-12 (CYANOCOBALAMIN) 50 MCG tablet Take 50 mcg by mouth daily      NON FORMULARY Take 1 tablet by mouth in the morning Natures balance fruit and vegies (Patient not taking: Reported on 10/10/2023)      Probiotic Product ([address]) Take by mouth (Patient not taking: Reported on 10/10/2023)       No current facility-administered medications for this visit. Allergies: Allergies   Allergen Reactions    Sulfa Antibiotics Hives    Neomycin Hives    Penicillins      Reaction Date: 12Nov2009;     Azithromycin Rash    Levaquin [Levofloxacin] Tachycardia      Physical Exam:     /80 (BP Location: Left arm, Patient Position: Sitting, Cuff Size: Standard)   Pulse 83   Temp 97.6 °F (36.4 °C) (Tympanic)   Resp 14   Ht 5' 2.64" (1.591 m)   Wt 42.5 kg (93 lb 9.6 oz)   LMP 02/27/2021 (Approximate)   SpO2 98%   BMI 16.77 kg/m²     Physical Exam  Vitals and nursing note reviewed. Constitutional:       General: She is not in acute distress. Appearance: Normal appearance. HENT:      Head: Normocephalic and atraumatic. Right Ear: Tympanic membrane and external ear normal.      Left Ear: Tympanic membrane and external ear normal.      Nose: Nose normal.      Mouth/Throat:      Mouth: Mucous membranes are moist.   Eyes:      Extraocular Movements: Extraocular movements intact.       Conjunctiva/sclera: Conjunctivae normal.      Pupils: Pupils are equal, round, and reactive to light. Cardiovascular:      Rate and Rhythm: Normal rate and regular rhythm. Pulses: Normal pulses. Heart sounds: Normal heart sounds. No murmur heard. Pulmonary:      Effort: Pulmonary effort is normal.      Breath sounds: Normal breath sounds. No wheezing, rhonchi or rales. Abdominal:      General: Bowel sounds are normal.      Palpations: Abdomen is soft. Tenderness: There is no abdominal tenderness. There is no guarding. Musculoskeletal:         General: Normal range of motion. Cervical back: Normal range of motion. Right lower leg: No edema. Left lower leg: No edema. Lymphadenopathy:      Cervical: No cervical adenopathy. Skin:     General: Skin is warm. Capillary Refill: Capillary refill takes less than 2 seconds. Neurological:      General: No focal deficit present. Mental Status: She is alert and oriented to person, place, and time.    Psychiatric:         Mood and Affect: Mood normal.         Behavior: Behavior normal.          Tata Burgess, DO  [address] [facility]

## 2023-10-19 ENCOUNTER — APPOINTMENT (OUTPATIENT)
Dept: LAB | Facility: CLINIC | Age: 58
End: 2023-10-19
Payer: COMMERCIAL

## 2023-10-19 DIAGNOSIS — R76.8 POSITIVE ANA (ANTINUCLEAR ANTIBODY): ICD-10-CM

## 2023-10-19 DIAGNOSIS — M35.9 UNDIFFERENTIATED CONNECTIVE TISSUE DISEASE (HCC): ICD-10-CM

## 2023-10-19 PROBLEM — H93.13 TINNITUS OF BOTH EARS: Status: ACTIVE | Noted: 2023-10-19

## 2023-10-19 PROCEDURE — 36415 COLL VENOUS BLD VENIPUNCTURE: CPT

## 2023-10-19 PROCEDURE — 85705 THROMBOPLASTIN INHIBITION: CPT

## 2023-10-19 PROCEDURE — 85670 THROMBIN TIME PLASMA: CPT

## 2023-10-19 PROCEDURE — 85613 RUSSELL VIPER VENOM DILUTED: CPT

## 2023-10-19 PROCEDURE — 85732 THROMBOPLASTIN TIME PARTIAL: CPT

## 2023-10-19 NOTE — ASSESSMENT & PLAN NOTE
- Patient recently diagnosed with lymphocytic colitis on colonoscopy. Per GI she was supposed to start budesonide however she does not want to take it as she has had issues with in the past.  -Discussed following up with GI to review other possible choices such as mesalamine.  -Does admit since changing her diet and her diarrhea has improved. -Continue elimination diet.  -Continue to follow with GI as scheduled.

## 2023-10-19 NOTE — ASSESSMENT & PLAN NOTE
- Patient complains of recurrent tenderness bilaterally.  -She does admit to seasonal allergies however is not congested or suffering at this point.  -Denies any noted hearing loss. Has never been evaluated for her tinnitus.   -ENT referral placed

## 2023-10-19 NOTE — ASSESSMENT & PLAN NOTE
- Patient following with rheumatology. Work-up after positive MARYELLEN has been reassuring for autoimmune diseases. -Does note positive carbonic anhydrase IgM and IgA. -Is being sent for further lupus of eyes connective tissue disease panel and lupus anticoagulant.  -With multiple small fiber neuropathies and questionable fibromyalgia she has been referred for physical therapy.  -She is working on her food illumination/anti-inflammatory diet and reports already having improved bowel movements.  -Continue following with rheumatology as scheduled.

## 2023-10-20 LAB — MISCELLANEOUS LAB TEST RESULT: NORMAL

## 2023-10-22 LAB
APTT SCREEN TO CONFIRM RATIO: 1.09 RATIO (ref 0–1.34)
CONFIRM APTT/NORMAL: 38 SEC (ref 0–47.6)
LA PPP-IMP: NORMAL
SCREEN APTT: 27.9 SEC (ref 0–43.5)
SCREEN DRVVT: 33.1 SEC (ref 0–47)
THROMBIN TIME: 18.4 SEC (ref 0–23)

## 2024-01-22 ENCOUNTER — TELEPHONE (OUTPATIENT)
Dept: NEUROLOGY | Facility: CLINIC | Age: 59
End: 2024-01-22

## 2024-01-22 NOTE — TELEPHONE ENCOUNTER
Patient called to cancel their sleep study consult    Patient does not wish to RS at this time     Patient stated that they are going much better and If they feel they need to see Sleep medicine in the future they will call back    Appointment cancellled

## 2024-02-27 ENCOUNTER — TELEPHONE (OUTPATIENT)
Dept: NEUROLOGY | Facility: CLINIC | Age: 59
End: 2024-02-27

## 2024-02-27 NOTE — TELEPHONE ENCOUNTER
Spoke with patient about scheduling a 4 month follow up appointment with Dr. Elaine. Patient stated during the call, she felt at this time she didn't need a follow up. Patient stated she'd call the department if needed.

## 2024-03-04 ENCOUNTER — TELEPHONE (OUTPATIENT)
Age: 59
End: 2024-03-04

## 2024-03-14 ENCOUNTER — TELEPHONE (OUTPATIENT)
Dept: GASTROENTEROLOGY | Facility: CLINIC | Age: 59
End: 2024-03-14

## 2024-03-14 NOTE — TELEPHONE ENCOUNTER
Pt was due in December for a follow up with Dr Sanchez, I called and relayed this info as pt was not aware she was due. Pt preferred [facility] for her appt and is scheduled 8/14 with Dr Sanchez

## 2024-03-19 ENCOUNTER — OFFICE VISIT (OUTPATIENT)
Age: 59
End: 2024-03-19
Payer: COMMERCIAL

## 2024-03-19 VITALS
WEIGHT: 97 LBS | HEIGHT: 63 IN | BODY MASS INDEX: 17.19 KG/M2 | DIASTOLIC BLOOD PRESSURE: 62 MMHG | HEART RATE: 74 BPM | TEMPERATURE: 97.8 F | OXYGEN SATURATION: 99 % | SYSTOLIC BLOOD PRESSURE: 138 MMHG

## 2024-03-19 DIAGNOSIS — R53.82 CHRONIC FATIGUE: ICD-10-CM

## 2024-03-19 DIAGNOSIS — K21.9 GASTROESOPHAGEAL REFLUX DISEASE WITHOUT ESOPHAGITIS: ICD-10-CM

## 2024-03-19 DIAGNOSIS — K58.2 IRRITABLE BOWEL SYNDROME WITH BOTH CONSTIPATION AND DIARRHEA: ICD-10-CM

## 2024-03-19 DIAGNOSIS — R76.8 POSITIVE ANA (ANTINUCLEAR ANTIBODY): ICD-10-CM

## 2024-03-19 DIAGNOSIS — G47.9 SLEEP DISTURBANCE: ICD-10-CM

## 2024-03-19 DIAGNOSIS — G62.9 SMALL FIBER NEUROPATHY: ICD-10-CM

## 2024-03-19 DIAGNOSIS — M35.9 UNDIFFERENTIATED CONNECTIVE TISSUE DISEASE (HCC): Primary | ICD-10-CM

## 2024-03-19 DIAGNOSIS — M79.7 FIBROMYALGIA: ICD-10-CM

## 2024-03-19 DIAGNOSIS — K52.832 LYMPHOCYTIC COLITIS: ICD-10-CM

## 2024-03-19 DIAGNOSIS — M15.0 PRIMARY GENERALIZED (OSTEO)ARTHRITIS: ICD-10-CM

## 2024-03-19 DIAGNOSIS — G60.9 IDIOPATHIC PERIPHERAL NEUROPATHY: ICD-10-CM

## 2024-03-19 PROCEDURE — 99214 OFFICE O/P EST MOD 30 MIN: CPT | Performed by: INTERNAL MEDICINE

## 2024-03-19 NOTE — ASSESSMENT & PLAN NOTE
Reflux symptoms have improved with the anti-inflammatory diet and raising her bed. She does note certain foods precipitate reflux including cheese. She is trying to avoid those foods that exacerbate her symptoms.  Continue to monitor.

## 2024-03-19 NOTE — ASSESSMENT & PLAN NOTE
Patient with history of positive MARYELLEN. Lupus AVISE from 10/19/2023 with negative index suggesting low likelihood for lupus. Her MARYELLEN by IgG and HEP-2 antibody are positive, however, all components of the MARYELLEN are negative. We will monitor yearly in view of her history of Raynaud's and sicca symptoms. We will plan to follow up in 11/2024 with lupus AVISE testing the month prior.

## 2024-03-19 NOTE — ASSESSMENT & PLAN NOTE
Irritable bowel symptoms markedly improved with the anti-inflammatory diet. Continue anti-inflammatory diet as before.

## 2024-03-19 NOTE — PROGRESS NOTES
Assessment/Plan:    Undifferentiated connective tissue disease (HCC)  Patient with history of positive MARYELLEN. Lupus AVISE from 10/19/2023 with negative index suggesting low likelihood for lupus. Her MARYELLEN by IgG and HEP-2 antibody are positive, however, all components of the MARYELLEN are negative. We will monitor yearly in view of her history of Raynaud's and sicca symptoms. We will plan to follow up in 11/2024 with lupus AVISE testing the month prior.    Fibromyalgia  Fibromyalgia with migratory polyarthralgia's and sleep disturbance with fatigue. Symptoms have improved significantly with following an anti-inflammatory diet, albeit not complete elimination of gluten, dairy, and sugar. She does note that certain foods do precipitate GI symptoms or generalized malaise. I would continue the anti-inflammatory diet. I would add back exercise in the form of walking and water exercise, which should be therapeutic for her joints. We will continue to monitor.      Sleep disturbance  She states that she is sleeping better, although she does not feel refreshed. I encouraged continued anti-inflammatory diet. I encouraged home exercise program. We will continue to monitor.      Primary generalized (osteo)arthritis  Interphalangeal osteoarthritis, hands and feet with early Heberden's nodes.  Encouraged trial of paraffin wax bath for symptomatic relief.  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 No evidence for inflammatory arthritis. Encouraged home exercise program as tolerated.    GERD (gastroesophageal reflux disease)  Reflux symptoms have improved with the anti-inflammatory diet and raising her bed. She does note certain foods precipitate reflux including cheese. She is trying to avoid those foods that exacerbate her symptoms.  Continue to monitor.    Irritable bowel syndrome with both constipation and diarrhea  Irritable bowel symptoms markedly improved with the anti-inflammatory diet. Continue anti-inflammatory diet as  before.    Lymphocytic colitis  History of lymphocytic colitis noted on colonoscopy from last fall.  Patient was not willing to take budesonide due to potential for side effects, however, her GI symptoms have improved with food elimination diet.  Continue to monitor.  Follow-up GI as scheduled.    Small fiber neuropathy  Persistence of vague paresthesias and numbness with EMG right upper extremity unremarkable.  Suspect small fiber neuropathy related to fibromyalgia.  Could consider medication if symptoms warrant.  Continue to monitor.    Positive MARYELLEN (antinuclear antibody)  Positive MARYELLEN with lupus Avise with negative index with low likelihood for lupus.  Monitor lupus Avise yearly.  Plan follow-up in November 2024 with repeat lupus Avise prior to the office visit.    Chronic fatigue  She states that she does sleep better although does not feel refreshed upon awakening.  I encouraged her to be more consistent with eliminating gluten, dairy, and sugar, even if she does not feel that certain foods bother her.  Encourage ongoing exercise program as tolerated.  Continue to monitor.    Idiopathic peripheral neuropathy  Patient does have history of idiopathic neuropathy with ulnar neuropathy and history of dystonia.  She had neurologic evaluation Wilsons disease was ruled out.  She notes improvement in the symptoms likely related to the anti-inflammatory diet.  I suspect small fiber neuropathy rather than large fiber neuropathy particularly with negative EMG study.  Symptoms improving.  Continue to monitor.  Follow-up neurology as needed.             Problem List Items Addressed This Visit     Idiopathic peripheral neuropathy     Patient does have history of idiopathic neuropathy with ulnar neuropathy and history of dystonia.  She had neurologic evaluation Wilsons disease was ruled out.  She notes improvement in the symptoms likely related to the anti-inflammatory diet.  I suspect small fiber neuropathy rather than large  fiber neuropathy particularly with negative EMG study.  Symptoms improving.  Continue to monitor.  Follow-up neurology as needed.         Small fiber neuropathy     Persistence of vague paresthesias and numbness with EMG right upper extremity unremarkable.  Suspect small fiber neuropathy related to fibromyalgia.  Could consider medication if symptoms warrant.  Continue to monitor.         GERD (gastroesophageal reflux disease)     Reflux symptoms have improved with the anti-inflammatory diet and raising her bed. She does note certain foods precipitate reflux including cheese. She is trying to avoid those foods that exacerbate her symptoms.  Continue to monitor.         Positive MARYELLEN (antinuclear antibody)     Positive MARYELLEN with lupus Avise with negative index with low likelihood for lupus.  Monitor lupus Avise yearly.  Plan follow-up in November 2024 with repeat lupus Avise prior to the office visit.         Relevant Orders    MISCELLANEOUS LAB TEST    Lymphocytic colitis     History of lymphocytic colitis noted on colonoscopy from last fall.  Patient was not willing to take budesonide due to potential for side effects, however, her GI symptoms have improved with food elimination diet.  Continue to monitor.  Follow-up GI as scheduled.         Undifferentiated connective tissue disease (HCC) - Primary     Patient with history of positive MARYELLEN. Lupus AVISE from 10/19/2023 with negative index suggesting low likelihood for lupus. Her MARYELLEN by IgG and HEP-2 antibody are positive, however, all components of the MARYELLEN are negative. We will monitor yearly in view of her history of Raynaud's and sicca symptoms. We will plan to follow up in 11/2024 with lupus AVISE testing the month prior.         Relevant Orders    MISCELLANEOUS LAB TEST    Chronic fatigue     She states that she does sleep better although does not feel refreshed upon awakening.  I encouraged her to be more consistent with eliminating gluten, dairy, and sugar, even  if she does not feel that certain foods bother her.  Encourage ongoing exercise program as tolerated.  Continue to monitor.         Sleep disturbance     She states that she is sleeping better, although she does not feel refreshed. I encouraged continued anti-inflammatory diet. I encouraged home exercise program. We will continue to monitor.           Irritable bowel syndrome with both constipation and diarrhea     Irritable bowel symptoms markedly improved with the anti-inflammatory diet. Continue anti-inflammatory diet as before.         Primary generalized (osteo)arthritis     Interphalangeal osteoarthritis, hands and feet with early Heberden's nodes.  Encouraged trial of paraffin wax bath for symptomatic relief.  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 No evidence for inflammatory arthritis. Encouraged home exercise program as tolerated.         Fibromyalgia     Fibromyalgia with migratory polyarthralgia's and sleep disturbance with fatigue. Symptoms have improved significantly with following an anti-inflammatory diet, albeit not complete elimination of gluten, dairy, and sugar. She does note that certain foods do precipitate GI symptoms or generalized malaise. I would continue the anti-inflammatory diet. I would add back exercise in the form of walking and water exercise, which should be therapeutic for her joints. We will continue to monitor.                  Reviewed records, labs, and imaging with the patient in detail.  Counseled patient.  Discussion regarding my findings and recommendations.  Office visit with documentation 35 min.    Subjective:      Patient ID: Lennie Salinas is a 58 y.o. female.    Lennie Salinas is a 58-year-old female who presents for a follow up visit.     The patient with complicated medical history with positive MARYELLEN with no typical stigmata for connective tissue disease with the exception of sicca symptoms and Raynaud's with no digital ulcerations. Her Sjogren's  antibodies were negative, however, she was noted to have positive carbonic anhydrase VI, positive IgM and IgA with negative IgG. Salivary protein antibody and parotid specific antibody negative. She does have history of weight loss related to lymphocytic colitis noted on colonoscopy from last fall. She had been given a prescription for budesonide, which she did not take due to concerns of side effects. She has had migratory polyarthralgia's and myalgias. She was noted on her initial consultation to have multiple triggers consistent with fibromyalgia and chronic complex pain syndrome. She does have paresthesia's and numbness with EMG right upper extremity unremarkable. I suspect that she does have small fiber neuropathy questionably related to fibromyalgia. She has sleep disturbance with chronic fatigue and irritable bowel syndrome with alternating diarrhea and constipation. History of reflux. History of vasovagal episodes. Primary generalized osteoarthritis with history of left knee medial meniscus tear treated with physical therapy. History of urinary symptoms. Rule out interstitial cystitis.    She still has heat and cold intolerance. She has food allergies and regular environmental allergies. She denies having vertigo. She had a mole cut out from her neck, and had a reaction to the Band-Aid and antiseptic that she put on it 3 weeks ago. She did not realize there was sulfur in it. She was putting on Vaseline, but it still was getting blistery. It is improving slowly as everything she puts on it aggravates her itching. The steroid cream made her eyelashes fall out and she could not move 2 of her fingers. Her easy bruising has improved. Her GI symptoms have improved overall. She notes migratory polyarthralgias with no joint swelling. Her episodic diarrhea, nausea, vomiting, sweating, and tingling in the arms has improved. She is sleeping well. She is following a healthier diet. She feels she is intolerant to dairy  "and too much sugar which can aggravate her symptoms. She still gets atypical chest pain, but thinks it is more due to the cysts in her breast. She has been eating minimum gluten. She eats a slice of pizza only when she takes her lactose pill. Consumption of too much of dairy causes her stomach upset the next day. She followed a strict diet for a long time. She eats gluten-free bread, turkey, and lettuce. She gets nose sores or mouth sores monthly, which resolved quickly.  She has new ones which look like blisters. She notices dry mouth if she consumes a lot of sugar. Her gum recession is stable. She denies cavities or dental decay. She gets hot flashes intermittently. She uses drops for her dry eyes. Her sleep is overall improved and has been able to sleep for longer packages of time, however, she still feels tired in the morning. Her hair thinning is better. She experiences pain in her joints, when she wakes up, or if she is lying on her side or hip, her elbow will be on \" fire\". She feels swelling in the top of her right foot. The patient gives additional history that she did go to physical therapy at [facility] where they specialize in myofascial syndromes, which has also contributed to her feeling better.    Lupus AVISE dated 10/19/2023 significant for negative index of -1.4 with positive MARYELLEN by IgG as well as positive MARYELLEN by HEP-2 antibody in a dilution of 320 in a speckled pattern with all components negative. Sjogren's antibodies negative. Thyroid antibodies negative. Rheumatoid factor is negative. Phospholipid antibody profile negative. Anti-centromere antibodies, scleroderma antibodies, anti-Peyton-1 antibodies all negative. Low likelihood for lupus based on the lupus AVISE. Lupus anticoagulant negative. Review of lab work dated 08/12/2023 significant for nonreactive hepatitis C antibody, HIV 1/2 antigen/antibody, and thyroid function studies.          Allergies: She has food allergies and regular " environmental allergies.  Allergies   Allergen Reactions   • Sulfa Antibiotics Hives   • Neomycin Hives   • Penicillins      Reaction Date: 12Nov2009;    • Azithromycin Rash   • Levaquin [Levofloxacin] Tachycardia       Home Medications    Current Outpatient Medications:   •  D-MANNOSE PO, Take by mouth, Disp: , Rfl:   •  NON FORMULARY, Femdophilus, Disp: , Rfl:   •  NON FORMULARY, Take 1 tablet by mouth in the morning Natures balance fruit and vegies, Disp: , Rfl:   •  Probiotic Product (PopegoA IMMUNE HEALTH PO), Take by mouth, Disp: , Rfl:   •  vitamin B-12 (CYANOCOBALAMIN) 50 MCG tablet, Take 50 mcg by mouth daily, Disp: , Rfl:   •  fluticasone (FLONASE) 50 mcg/act nasal spray, 1 spray into each nostril daily, Disp: 9.9 mL, Rfl: 1  •  omeprazole (PriLOSEC) 40 MG capsule, Take 1 capsule (40 mg total) by mouth daily, Disp: 30 capsule, Rfl: 1    Past Medical History  Past Medical History:   Diagnosis Date   • Allergic    • Costochondritis 05/31/2018   • Degenerative tear of left medial meniscus    • Dystonia 04/28/2016   • GERD (gastroesophageal reflux disease)    • Hypertension     usually low but spikes   • Lactose intolerance    • Miscarriage 1999, 2000   • Urinary tract infection comes and goes blood in urine   • Visual impairment posterior vitreous detachment    2020       Past Surgical History   Past Surgical History:   Procedure Laterality Date   • BLADDER SURGERY     • COLONOSCOPY     • DILATION AND CURETTAGE OF UTERUS      Miscarriage x2   • HERNIA REPAIR      inguinal, left   • CA COLONOSCOPY FLX DX W/COLLJ SPEC WHEN PFRMD N/A 09/12/2018    Procedure: EGD AND COLONOSCOPY;  Surgeon: Nash Snachez MD;  Location: [facility];  Service: Gastroenterology   • TONSILLECTOMY     • UPPER GASTROINTESTINAL ENDOSCOPY         Family History   Family History   Problem Relation Age of Onset   • Hyperlipidemia Mother    • Hypertension Mother    • Liver disease Mother    • Pancreatic cancer Mother    • Mental illness  Mother    • Cancer Mother         Pancreas & MEN-1   • Stroke Mother         mom had mini stroke   • Colon cancer Father 70   • Hypertension Brother    • Alcohol abuse Brother    • Mental illness Brother    • ADD / ADHD Brother    • OCD Brother    • Heart disease Maternal Grandmother    • Dementia Maternal Grandmother    • Arthritis Maternal Grandmother    • Heart disease Maternal Grandfather    • Skin cancer Paternal Grandmother    • Breast cancer Paternal Grandmother         skin,bone & breast   • Kidney disease Paternal Grandfather    • Heart disease Maternal Uncle    • Diabetes Maternal Uncle    • Autoimmune disease Paternal Aunt         MS   • Autoimmune disease Paternal Aunt         Lupus   • Substance Abuse Neg Hx    • Depression Neg Hx        The following portions of the patient's history were reviewed and updated as appropriate: allergies, current medications, past family history, past medical history, past social history, past surgical history, and problem list.    Review of Systems   Constitutional:  Negative for chills and fever.        Episodes feeling flushed   HENT:  Positive for mouth sores and tinnitus. Negative for ear pain and sore throat.         Nasal sores/mouth sores comes and goes  Nasal sores painful, gone I-2 days  Dry mouth with eating sugar  Gum receding   Eyes:  Negative for pain and visual disturbance.        Dry eyes uses drops   Respiratory:  Negative for cough and shortness of breath.    Cardiovascular:  Positive for chest pain (atypical chest pain). Negative for palpitations.   Gastrointestinal:  Negative for abdominal pain and vomiting.        Episodic diarrhea assoc with nausea/vomiting, sweating, tingling in arms resolved  Can have atypical chest wall pain rarely   Endocrine: Positive for cold intolerance and heat intolerance.   Genitourinary:  Negative for dysuria and hematuria.   Musculoskeletal:  Positive for arthralgias and myalgias. Negative for back pain.   Skin:  Positive  "for rash. Negative for color change.        Hair thinning  Excessive hair loss 2021  Right temple hair loss with weight loss   Allergic/Immunologic: Positive for environmental allergies (mold and dust) and food allergies (garlic, onions, grapes, milk, pineapple,, sulfites, sulfates).   Neurological:  Positive for dizziness (vertigo resolved). Negative for seizures and syncope.   Hematological:  Bruises/bleeds easily (improved).   Psychiatric/Behavioral:  Positive for sleep disturbance (non refreshed).         Hx insomnia, sleep paralysis   All other systems reviewed and are negative.        Objective:      /62 (BP Location: Right arm, Patient Position: Sitting, Cuff Size: Adult)   Pulse 74   Temp 97.8 °F (36.6 °C) (Temporal)   Ht 5' 2.5\" (1.588 m)   Wt 44 kg (97 lb)   LMP 02/27/2021 (Approximate)   SpO2 99%   BMI 17.46 kg/m²          Physical Exam  Vitals reviewed.   Constitutional:       Appearance: Normal appearance.   HENT:      Head: Normocephalic.      Nose:      Comments: Nose and throat unremarkable.  Eyes:      Extraocular Movements: Extraocular movements intact.   Neck:      Comments: Without masses, thyromegaly, lymphadenopathy  Cardiovascular:      Rate and Rhythm: Regular rhythm.   Pulmonary:      Breath sounds: Normal breath sounds.   Abdominal:      Palpations: Abdomen is soft.   Musculoskeletal:      Cervical back: Neck supple.      Comments: Neck essentially full range of motion with triggers posterior cervical and shoulder muscles.  Shoulders full range of motion.  Elbows full range of motion with triggers medial greater than lateral epicondyles.  Wrists full range of motion.  Tinel's negative bilaterally.  Tinel's negative bilateral elbows.  Hands squaring first carpometacarpal joints bilaterally with early Heberden's nodes.  No synovitis appreciated.  Straight leg raising negative bilaterally.  Schober's negative.  No SI joint tenderness appreciated.  Hips full range of motion with " triggers trochanteric bursae.  Knees full range of motion with patellofemoral crepitus with triggers pes anserine bursae.  Ankles full range of motion.  Feet rearfoot hyperpronation with midfoot cavus deformities, early hallux valgus deformities, early tailor's bunions left greater than right with no synovitis appreciated.  No tenderness plantar fascia insertion.     Skin:     General: Skin is warm.      Comments: Raynaud's noted fingers greater than toes.  No digital ulcerations.  No dilated nailfold capillary loops.  Contact dermatitis right neck from bandaid aggravated by vasoline   Neurological:      General: No focal deficit present.               Transcribed for Valeria Valle MD, by Giovanni Ospina on 03/31/24 at 4:34 PM. Powered by Dragon [employer].

## 2024-03-19 NOTE — ASSESSMENT & PLAN NOTE
She states that she is sleeping better, although she does not feel refreshed. I encouraged continued anti-inflammatory diet. I encouraged home exercise program. We will continue to monitor.

## 2024-03-19 NOTE — PATIENT INSTRUCTIONS
I would call the dermatologist regarding what kind of topical steroid that you could use for your neck because it looks like a contact dermatitis from the Band-Aid.  Tell her that you reacted to the triamcinolone she gave you.  With regard to connective tissue disease, despite the fact that you have a positive MARYELLEN, there is a low likelihood for lupus and all of the other connective tissue diseases tested for were negative.  I do think you have fibromyalgia but you have natures of significantly better with eating more healthfully and trying to cut out as much gluten, dairy, and sugar.  I would start home exercise with walking and a water exercise program.  Water is therapeutic for your joints.  You do have osteoarthritis obviously in your fingers and your knees.  For your fingers you can get a paraffin wax bath off of [employer] for about $40-$50 and dip your hands and the wax.  It just gives deeper here and feels better.  Does not cure anything.  But it would help those little nobs your fingers to feel better.  Get lab work about 3 weeks before your next office visit in November.  Will check the lupus parameters yearly.

## 2024-03-19 NOTE — ASSESSMENT & PLAN NOTE
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  Encouraged trial of paraffin wax bath for symptomatic relief.  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 No evidence for inflammatory arthritis. Encouraged home exercise program as tolerated.

## 2024-03-19 NOTE — ASSESSMENT & PLAN NOTE
Fibromyalgia with migratory polyarthralgia's and sleep disturbance with fatigue. Symptoms have improved significantly with following an anti-inflammatory diet, albeit not complete elimination of gluten, dairy, and sugar. She does note that certain foods do precipitate GI symptoms or generalized malaise. I would continue the anti-inflammatory diet. I would add back exercise in the form of walking and water exercise, which should be therapeutic for her joints. We will continue to monitor.

## 2024-03-31 NOTE — ASSESSMENT & PLAN NOTE
She states that she does sleep better although does not feel refreshed upon awakening.  I encouraged her to be more consistent with eliminating gluten, dairy, and sugar, even if she does not feel that certain foods bother her.  Encourage ongoing exercise program as tolerated.  Continue to monitor.

## 2024-03-31 NOTE — ASSESSMENT & PLAN NOTE
Positive MARYELLEN with lupus Avise with negative index with low likelihood for lupus.  Monitor lupus Avise yearly.  Plan follow-up in November 2024 with repeat lupus Avise prior to the office visit.   HTN (hypertension)

## 2024-03-31 NOTE — ASSESSMENT & PLAN NOTE
Patient does have history of idiopathic neuropathy with ulnar neuropathy and history of dystonia.  She had neurologic evaluation Wilsons disease was ruled out.  She notes improvement in the symptoms likely related to the anti-inflammatory diet.  I suspect small fiber neuropathy rather than large fiber neuropathy particularly with negative EMG study.  Symptoms improving.  Continue to monitor.  Follow-up neurology as needed.

## 2024-03-31 NOTE — ASSESSMENT & PLAN NOTE
Persistence of vague paresthesias and numbness with EMG right upper extremity unremarkable.  Suspect small fiber neuropathy related to fibromyalgia.  Could consider medication if symptoms warrant.  Continue to monitor.

## 2024-03-31 NOTE — ASSESSMENT & PLAN NOTE
History of lymphocytic colitis noted on colonoscopy from last fall.  Patient was not willing to take budesonide due to potential for side effects, however, her GI symptoms have improved with food elimination diet.  Continue to monitor.  Follow-up GI as scheduled.

## 2024-04-03 ENCOUNTER — APPOINTMENT (OUTPATIENT)
Dept: LAB | Facility: CLINIC | Age: 59
End: 2024-04-03
Payer: COMMERCIAL

## 2024-04-03 DIAGNOSIS — R63.4 WEIGHT LOSS: ICD-10-CM

## 2024-04-03 DIAGNOSIS — E78.49 OTHER HYPERLIPIDEMIA: ICD-10-CM

## 2024-04-03 LAB
ALBUMIN SERPL BCP-MCNC: 4.5 G/DL (ref 3.5–5)
ALP SERPL-CCNC: 88 U/L (ref 34–104)
ALT SERPL W P-5'-P-CCNC: 19 U/L (ref 7–52)
ANION GAP SERPL CALCULATED.3IONS-SCNC: 9 MMOL/L (ref 4–13)
AST SERPL W P-5'-P-CCNC: 20 U/L (ref 13–39)
BASOPHILS # BLD AUTO: 0.02 THOUSANDS/ÂΜL (ref 0–0.1)
BASOPHILS NFR BLD AUTO: 0 % (ref 0–1)
BILIRUB SERPL-MCNC: 1.03 MG/DL (ref 0.2–1)
BUN SERPL-MCNC: 13 MG/DL (ref 5–25)
CALCIUM SERPL-MCNC: 9.3 MG/DL (ref 8.4–10.2)
CHLORIDE SERPL-SCNC: 100 MMOL/L (ref 96–108)
CHOLEST SERPL-MCNC: 262 MG/DL
CO2 SERPL-SCNC: 29 MMOL/L (ref 21–32)
CREAT SERPL-MCNC: 0.9 MG/DL (ref 0.6–1.3)
EOSINOPHIL # BLD AUTO: 0.08 THOUSAND/ÂΜL (ref 0–0.61)
EOSINOPHIL NFR BLD AUTO: 2 % (ref 0–6)
ERYTHROCYTE [DISTWIDTH] IN BLOOD BY AUTOMATED COUNT: 12.8 % (ref 11.6–15.1)
GFR SERPL CREATININE-BSD FRML MDRD: 71 ML/MIN/1.73SQ M
GLUCOSE P FAST SERPL-MCNC: 85 MG/DL (ref 65–99)
HCT VFR BLD AUTO: 44.3 % (ref 34.8–46.1)
HDLC SERPL-MCNC: 85 MG/DL
HGB BLD-MCNC: 14.8 G/DL (ref 11.5–15.4)
IMM GRANULOCYTES # BLD AUTO: 0.01 THOUSAND/UL (ref 0–0.2)
IMM GRANULOCYTES NFR BLD AUTO: 0 % (ref 0–2)
LDLC SERPL CALC-MCNC: 162 MG/DL (ref 0–100)
LYMPHOCYTES # BLD AUTO: 2.92 THOUSANDS/ÂΜL (ref 0.6–4.47)
LYMPHOCYTES NFR BLD AUTO: 57 % (ref 14–44)
MCH RBC QN AUTO: 30.5 PG (ref 26.8–34.3)
MCHC RBC AUTO-ENTMCNC: 33.4 G/DL (ref 31.4–37.4)
MCV RBC AUTO: 91 FL (ref 82–98)
MONOCYTES # BLD AUTO: 0.53 THOUSAND/ÂΜL (ref 0.17–1.22)
MONOCYTES NFR BLD AUTO: 10 % (ref 4–12)
NEUTROPHILS # BLD AUTO: 1.61 THOUSANDS/ÂΜL (ref 1.85–7.62)
NEUTS SEG NFR BLD AUTO: 31 % (ref 43–75)
NRBC BLD AUTO-RTO: 0 /100 WBCS
PLATELET # BLD AUTO: 228 THOUSANDS/UL (ref 149–390)
PMV BLD AUTO: 11.7 FL (ref 8.9–12.7)
POTASSIUM SERPL-SCNC: 3.4 MMOL/L (ref 3.5–5.3)
PROT SERPL-MCNC: 7.2 G/DL (ref 6.4–8.4)
RBC # BLD AUTO: 4.86 MILLION/UL (ref 3.81–5.12)
SODIUM SERPL-SCNC: 138 MMOL/L (ref 135–147)
TRIGL SERPL-MCNC: 75 MG/DL
TSH SERPL DL<=0.05 MIU/L-ACNC: 1.73 UIU/ML (ref 0.45–4.5)
WBC # BLD AUTO: 5.17 THOUSAND/UL (ref 4.31–10.16)

## 2024-04-03 PROCEDURE — 36415 COLL VENOUS BLD VENIPUNCTURE: CPT

## 2024-04-03 PROCEDURE — 80053 COMPREHEN METABOLIC PANEL: CPT

## 2024-04-03 PROCEDURE — 84443 ASSAY THYROID STIM HORMONE: CPT

## 2024-04-03 PROCEDURE — 80061 LIPID PANEL: CPT

## 2024-04-03 PROCEDURE — 85025 COMPLETE CBC W/AUTO DIFF WBC: CPT

## 2024-04-10 ENCOUNTER — RA CDI HCC (OUTPATIENT)
Dept: OTHER | Facility: HOSPITAL | Age: 59
End: 2024-04-10

## 2024-04-10 NOTE — PROGRESS NOTES
HCC coding opportunities       Chart reviewed, no opportunity found: CHART REVIEWED, NO OPPORTUNITY FOUND        Patients Insurance        Commercial Insurance: [employer]

## 2024-04-17 ENCOUNTER — OFFICE VISIT (OUTPATIENT)
Dept: FAMILY MEDICINE CLINIC | Facility: CLINIC | Age: 59
End: 2024-04-17
Payer: COMMERCIAL

## 2024-04-17 VITALS
TEMPERATURE: 98.7 F | HEART RATE: 96 BPM | SYSTOLIC BLOOD PRESSURE: 114 MMHG | HEIGHT: 63 IN | WEIGHT: 95.8 LBS | BODY MASS INDEX: 16.97 KG/M2 | RESPIRATION RATE: 16 BRPM | OXYGEN SATURATION: 99 % | DIASTOLIC BLOOD PRESSURE: 64 MMHG

## 2024-04-17 DIAGNOSIS — Z82.49 FAMILY HISTORY OF CARDIAC DISORDER: ICD-10-CM

## 2024-04-17 DIAGNOSIS — Z88.9 MULTIPLE ALLERGIES: Primary | ICD-10-CM

## 2024-04-17 DIAGNOSIS — M35.9 UNDIFFERENTIATED CONNECTIVE TISSUE DISEASE (HCC): ICD-10-CM

## 2024-04-17 DIAGNOSIS — R07.89 CHEST DISCOMFORT: ICD-10-CM

## 2024-04-17 DIAGNOSIS — Z13.1 SCREENING FOR DIABETES MELLITUS (DM): ICD-10-CM

## 2024-04-17 DIAGNOSIS — R17 ELEVATED BILIRUBIN: ICD-10-CM

## 2024-04-17 PROCEDURE — 99213 OFFICE O/P EST LOW 20 MIN: CPT | Performed by: FAMILY MEDICINE

## 2024-04-17 NOTE — PROGRESS NOTES
Name: Lennie Salinas      : 1965      MRN: [id number]  Encounter Provider: See Douglas DO  Encounter Date: 2024   Encounter department: [facility]    Assessment & Plan     1. Multiple allergies  Assessment & Plan:  - Has concern due to multiple environmental allergies.  Has never been tested and unclear what else she may be allergic to.  -Will refer to allergist for evaluation and recommendations.  -Continue OTC Zyrtec or Allegra daily during seasonal changes.    Orders:  -     Ambulatory Referral to Allergy; Future    2. Screening for diabetes mellitus (DM)  -     Comprehensive metabolic panel; Future    3. Elevated bilirubin  -     Comprehensive metabolic panel; Future    4. Chest discomfort  Assessment & Plan:  - Family history of heart disease.  Has noted some episodes where she feels like she may have some chest discomfort.  Often relieved with rest.  Very sporadic.   -Will get stress testing  -Consider cardiology referral after stress test.      Orders:  -     Stress test only, exercise; Future; Expected date: 2024    5. Family history of cardiac disorder  -     Stress test only, exercise; Future; Expected date: 2024    6. Undifferentiated connective tissue disease (HCC)  Assessment & Plan:  - Following with rheumatology annually.               Subjective     Lennie is a 58-year-old female who presents today for follow-up.  She notes overall she is doing well.  She is working on her diet to maintain some weight.  She she is trying to increase her protein intake.  Does admit she is very active and likely does not get enough calories or protein.  She denies any symptoms of discomfort with eating.  We did discuss history of allergy symptoms.  She does take Claritin or Zyrtec occasionally when seasonal changes however is not sure exactly what she is allergic to.  She also notes multiple medicine allergies and is unclear what exactly she might be allergic to.  She  would like to follow-up with an allergist.  He also notes has family history of cardiac disease.  She does admit she has had periods where she feels chest discomfort.  No pressure or pain.  No shortness of breath or wheezing however some mild discomfort.  Currently feels normal.  No palpitations.  Denies any fevers, chills, nausea or vomiting.  No unwanted weight loss or night sweats.       Review of Systems   Constitutional:  Negative for chills and fever.   HENT:  Negative for congestion, ear pain and sore throat.    Eyes:  Negative for pain and visual disturbance.   Respiratory:  Negative for cough and shortness of breath.    Cardiovascular:  Negative for chest pain and palpitations.        Chest discomfort   Gastrointestinal:  Negative for abdominal pain, constipation, diarrhea, nausea and vomiting.   Genitourinary:  Negative for dysuria and hematuria.   Musculoskeletal:  Negative for arthralgias, back pain and neck pain.   Skin:  Negative for color change and rash.   Neurological:  Negative for seizures and syncope.   Psychiatric/Behavioral:  Negative for behavioral problems, confusion, dysphoric mood and sleep disturbance.    All other systems reviewed and are negative.      Past Medical History:   Diagnosis Date    Allergic     Costochondritis 05/31/2018    Degenerative tear of left medial meniscus     Dystonia 04/28/2016    GERD (gastroesophageal reflux disease)     Hypertension     usually low but spikes    Lactose intolerance     Miscarriage 1999, 2000    Urinary tract infection comes and goes blood in urine    Visual impairment posterior vitreous detachment    2020     Past Surgical History:   Procedure Laterality Date    BLADDER SURGERY      COLONOSCOPY      DILATION AND CURETTAGE OF UTERUS      Miscarriage x2    HERNIA REPAIR      inguinal, left    [place] COLONOSCOPY FLX DX W/COLLJ SPEC WHEN PFRMD N/A 09/12/2018    Procedure: EGD AND COLONOSCOPY;  Surgeon: Nash Sanchez MD;  [facility];  Service:  Gastroenterology    TONSILLECTOMY      UPPER GASTROINTESTINAL ENDOSCOPY       Family History   Problem Relation Age of Onset    Hyperlipidemia Mother     Hypertension Mother     Liver disease Mother     Pancreatic cancer Mother     Mental illness Mother     Cancer Mother         Pancreas & MEN-1    Stroke Mother         mom had mini stroke    Colon cancer Father 70    Hypertension Brother     Alcohol abuse Brother     Mental illness Brother     ADD / ADHD Brother     OCD Brother     Heart disease Maternal Grandmother     Dementia Maternal Grandmother     Arthritis Maternal Grandmother     Heart disease Maternal Grandfather     Skin cancer Paternal Grandmother     Breast cancer Paternal Grandmother         skin,bone & breast    Kidney disease Paternal Grandfather     Heart disease Maternal Uncle     Diabetes Maternal Uncle     Autoimmune disease Paternal Aunt         MS    Autoimmune disease Paternal Aunt         Lupus    Substance Abuse Neg Hx     Depression Neg Hx      Social History     Socioeconomic History    Marital status: /Civil Union     Spouse name: None    Number of children: None    Years of education: None    Highest education level: None   Occupational History    None   Tobacco Use    Smoking status: Former     Current packs/day: 0.00     Average packs/day: 1 pack/day for 8.0 years (8.0 ttl pk-yrs)     Types: Cigarettes     Start date: 1980     Quit date: 1987     Years since quittin.3     Passive exposure: Never    Smokeless tobacco: Never    Tobacco comments:     off and on not constant   Vaping Use    Vaping status: Former   Substance and Sexual Activity    Alcohol use: No    Drug use: Never     Types: Marijuana     Comment: teenager rare use marijuana    Sexual activity: Yes     Partners: Male     Birth control/protection: Condom Male, Post-menopausal   Other Topics Concern    None   Social History Narrative    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         Social  "Determinants of Health     Financial Resource Strain: Not on file   Food Insecurity: Not on file   Transportation Needs: Not on file   Physical Activity: Not on file   Stress: Not on file   Social Connections: Not on file   Intimate Partner Violence: Not on file   Housing Stability: Not on file     Current Outpatient Medications on File Prior to Visit   Medication Sig    D-MANNOSE PO Take by mouth    NON FORMULARY Femdophilus    NON FORMULARY Take 1 tablet by mouth in the morning Natures balance fruit and vegies    Probiotic Product ([employer] PO) Take by mouth    vitamin B-12 (CYANOCOBALAMIN) 50 MCG tablet Take 50 mcg by mouth daily     Allergies   Allergen Reactions    Sulfa Antibiotics Hives    Kenalog [Triamcinolone Acetonide] Hives    Neomycin Hives    Penicillins      Reaction Date: 12Nov2009;     Azithromycin Rash    Levaquin [Levofloxacin] Tachycardia     Immunization History   Administered Date(s) Administered    COVID-19 MODERNA VACC 0.5 ML IM 07/30/2021, 08/27/2021       Objective     /64 (BP Location: Left arm, Patient Position: Sitting, Cuff Size: Standard)   Pulse 96   Temp 98.7 °F (37.1 °C) (Tympanic)   Resp 16   Ht 5' 2.5\" (1.588 m)   Wt 43.5 kg (95 lb 12.8 oz)   LMP 02/27/2021 (Approximate)   SpO2 99%   BMI 17.24 kg/m²     Physical Exam  Vitals and nursing note reviewed.   Constitutional:       Appearance: Normal appearance.   HENT:      Head: Normocephalic and atraumatic.      Right Ear: Tympanic membrane and external ear normal.      Left Ear: Tympanic membrane and external ear normal.      Nose: Nose normal.      Mouth/Throat:      Mouth: Mucous membranes are moist.   Eyes:      Extraocular Movements: Extraocular movements intact.      Conjunctiva/sclera: Conjunctivae normal.      Pupils: Pupils are equal, round, and reactive to light.   Cardiovascular:      Rate and Rhythm: Normal rate and regular rhythm.      Pulses: Normal pulses.      Heart sounds: Normal heart " sounds. No murmur heard.  Pulmonary:      Effort: Pulmonary effort is normal.      Breath sounds: Normal breath sounds.   Abdominal:      General: Bowel sounds are normal.      Palpations: Abdomen is soft.   Musculoskeletal:         General: Normal range of motion.      Cervical back: Normal range of motion.   Lymphadenopathy:      Cervical: No cervical adenopathy.   Skin:     General: Skin is warm.      Capillary Refill: Capillary refill takes less than 2 seconds.   Neurological:      General: No focal deficit present.      Mental Status: She is alert and oriented to person, place, and time.   Psychiatric:         Mood and Affect: Mood normal.         Behavior: Behavior normal.       See Douglas, DO

## 2024-04-19 PROBLEM — R07.89 CHEST DISCOMFORT: Status: ACTIVE | Noted: 2024-04-19

## 2024-04-19 PROBLEM — Z88.9 MULTIPLE ALLERGIES: Status: ACTIVE | Noted: 2024-04-19

## 2024-04-19 NOTE — ASSESSMENT & PLAN NOTE
- Family history of heart disease.  Has noted some episodes where she feels like she may have some chest discomfort.  Often relieved with rest.  Very sporadic.   -Will get stress testing  -Consider cardiology referral after stress test.

## 2024-04-19 NOTE — ASSESSMENT & PLAN NOTE
- Has concern due to multiple environmental allergies.  Has never been tested and unclear what else she may be allergic to.  -Will refer to allergist for evaluation and recommendations.  -Continue OTC Zyrtec or Allegra daily during seasonal changes.

## 2024-04-23 ENCOUNTER — TELEPHONE (OUTPATIENT)
Age: 59
End: 2024-04-23

## 2024-04-23 NOTE — TELEPHONE ENCOUNTER
Pt stated her secondary ins was asking for a procedure code for the stress test.  She did give them the dx codes but they are asking for a procedure code.  CS advised pt she should not need one for her [employer] ins, but this is for the ins through her .  Provided cpt code of 83276 and advised to confirm with CS to make sure correct code for procedure.

## 2024-04-25 ENCOUNTER — HOSPITAL ENCOUNTER (OUTPATIENT)
Dept: NON INVASIVE DIAGNOSTICS | Facility: CLINIC | Age: 59
Discharge: HOME | End: 2024-04-25
Payer: COMMERCIAL

## 2024-04-25 DIAGNOSIS — R07.89 CHEST DISCOMFORT: ICD-10-CM

## 2024-04-25 DIAGNOSIS — Z82.49 FAMILY HISTORY OF CARDIAC DISORDER: ICD-10-CM

## 2024-04-25 LAB
MAX HR PERCENT: 85 %
MAX HR: 139 BPM
RATE PRESSURE PRODUCT: NORMAL
SL CV STRESS RECOVERY BP: NORMAL MMHG
SL CV STRESS RECOVERY HR: 83 BPM
SL CV STRESS RECOVERY O2 SAT: 98 %
SL CV STRESS STAGE REACHED: 3
STRESS ANGINA INDEX: 0
STRESS BASELINE BP: NORMAL MMHG
STRESS BASELINE HR: 80 BPM
STRESS O2 SAT REST: 97 %
STRESS PEAK HR: 139 BPM
STRESS POST ESTIMATED WORKLOAD: 10.1 METS
STRESS POST EXERCISE DUR MIN: 8 MIN
STRESS POST EXERCISE DUR SEC: 30 SEC
STRESS POST O2 SAT PEAK: 98 %
STRESS POST PEAK BP: 164 MMHG

## 2024-04-25 PROCEDURE — 93017 CV STRESS TEST TRACING ONLY: CPT

## 2024-04-25 PROCEDURE — 93018 CV STRESS TEST I&R ONLY: CPT | Performed by: INTERNAL MEDICINE

## 2024-04-25 PROCEDURE — 93016 CV STRESS TEST SUPVJ ONLY: CPT | Performed by: INTERNAL MEDICINE

## 2024-04-26 LAB
CHEST PAIN STATEMENT: NORMAL
MAX DIASTOLIC BP: 70 MMHG
MAX PREDICTED HEART RATE: 163 BPM
PROTOCOL NAME: NORMAL
REASON FOR TERMINATION: NORMAL
STRESS POST EXERCISE DUR MIN: 8 MIN
STRESS POST EXERCISE DUR SEC: 30 SEC
STRESS POST PEAK HR: 139 BPM
STRESS POST PEAK SYSTOLIC BP: 164 MMHG
TARGET HR FORMULA: NORMAL

## 2024-04-26 NOTE — RESULT ENCOUNTER NOTE
Tomer Hogue,    Your stress test was negative for any irregular heartbeats or signs of of oxygen to the heart after reaching maximal exercise.  This is very good.  Please let me know if you have any other questions.    Thank you  -Dr. DE SANTIAGO

## 2024-06-11 ENCOUNTER — LAB (OUTPATIENT)
Dept: LAB | Facility: CLINIC | Age: 59
End: 2024-06-11
Payer: COMMERCIAL

## 2024-06-11 DIAGNOSIS — R17 ELEVATED BILIRUBIN: ICD-10-CM

## 2024-06-11 DIAGNOSIS — Z13.1 SCREENING FOR DIABETES MELLITUS (DM): ICD-10-CM

## 2024-06-11 LAB
ALBUMIN SERPL BCP-MCNC: 4.4 G/DL (ref 3.5–5)
ALP SERPL-CCNC: 85 U/L (ref 34–104)
ALT SERPL W P-5'-P-CCNC: 18 U/L (ref 7–52)
ANION GAP SERPL CALCULATED.3IONS-SCNC: 8 MMOL/L (ref 4–13)
AST SERPL W P-5'-P-CCNC: 22 U/L (ref 13–39)
BILIRUB SERPL-MCNC: 0.8 MG/DL (ref 0.2–1)
BUN SERPL-MCNC: 13 MG/DL (ref 5–25)
CALCIUM SERPL-MCNC: 9.3 MG/DL (ref 8.4–10.2)
CHLORIDE SERPL-SCNC: 101 MMOL/L (ref 96–108)
CO2 SERPL-SCNC: 28 MMOL/L (ref 21–32)
CREAT SERPL-MCNC: 0.85 MG/DL (ref 0.6–1.3)
GFR SERPL CREATININE-BSD FRML MDRD: 76 ML/MIN/1.73SQ M
GLUCOSE P FAST SERPL-MCNC: 91 MG/DL (ref 65–99)
POTASSIUM SERPL-SCNC: 4.3 MMOL/L (ref 3.5–5.3)
PROT SERPL-MCNC: 7.1 G/DL (ref 6.4–8.4)
SODIUM SERPL-SCNC: 137 MMOL/L (ref 135–147)

## 2024-06-11 PROCEDURE — 80053 COMPREHEN METABOLIC PANEL: CPT

## 2024-06-11 PROCEDURE — 36415 COLL VENOUS BLD VENIPUNCTURE: CPT

## 2024-06-11 NOTE — RESULT ENCOUNTER NOTE
Tomer Hogue,    Your metabolic panel from your blood work looks great.  No concerning results.  Please let me know if you have any questions.    Thank you  -Dr. DE SANTIAGO

## 2024-07-08 ENCOUNTER — ANNUAL EXAM (OUTPATIENT)
Dept: OBGYN CLINIC | Facility: CLINIC | Age: 59
End: 2024-07-08
Payer: COMMERCIAL

## 2024-07-08 VITALS
WEIGHT: 96 LBS | DIASTOLIC BLOOD PRESSURE: 66 MMHG | SYSTOLIC BLOOD PRESSURE: 110 MMHG | BODY MASS INDEX: 17.66 KG/M2 | HEIGHT: 62 IN

## 2024-07-08 DIAGNOSIS — N64.4 BREAST PAIN, LEFT: ICD-10-CM

## 2024-07-08 DIAGNOSIS — Z87.2 HISTORY OF CYST OF BREAST: ICD-10-CM

## 2024-07-08 DIAGNOSIS — Z01.419 WOMEN'S ANNUAL ROUTINE GYNECOLOGICAL EXAMINATION: Primary | ICD-10-CM

## 2024-07-08 DIAGNOSIS — Z12.31 ENCOUNTER FOR SCREENING MAMMOGRAM FOR MALIGNANT NEOPLASM OF BREAST: ICD-10-CM

## 2024-07-08 PROCEDURE — 99396 PREV VISIT EST AGE 40-64: CPT | Performed by: PHYSICIAN ASSISTANT

## 2024-07-08 NOTE — PROGRESS NOTES
ASSESSMENT & PLAN:   Diagnoses and all orders for this visit:    Women's annual routine gynecological examination  -     Mammo screening bilateral w 3d & cad    Encounter for screening mammogram for malignant neoplasm of breast  -     Mammo screening bilateral w 3d & cad    Breast pain, left  -     US breast left limited (diagnostic); Future  -     Mammo diagnostic left w 3d & cad; Future    History of cyst of breast  -     US breast left limited (diagnostic); Future  -     Mammo diagnostic left w 3d & cad; Future          The following were reviewed in today's visit: ASCCP guidelines, Gardisil vaccination, STD testing breast self exam, mammography screening ordered, STD testing, exercise, healthy diet, and colonoscopy discussed and ordered.    Patient to return to office in yearly for annual exam.     All questions have been answered to her satisfaction.        CC:  Annual Gynecologic Examination  Chief Complaint   Patient presents with    Gynecologic Exam     Lennie Salinas is here for her annual exam; pap UTD/mammo ordered.  Pt is concerned about her left breast is larger than her right, and when she touches it, she feels pain and a little nodule.  (pap w/hpv 2023neg/neg  mammo 5/15/23)         HPI: Lennie Salinas is a 58 y.o.  who presents for annual gynecologic examination.  She has the following concerns:    Patient states left breast feels larger than the right. She also notes some left breast tenderness and a small lump. Per records, patient has reported these symptoms multiple times over the last few years. Breast imaging in the past showed a 5mm stable cyst in the lateral left breast with no other abnormalities. Patient reports her current symptoms are on the inner medial aspect of left breast  Reports occasional PMS symptoms with pelvic cramping, she denies bleeding. Similar concerns last year. Pelvic US ordered at that time which was normal      Health Maintenance:    Exercise:  intermittently  Breast exams/breast awareness: yes  Last mammogram:   Colorectal cancer screenin    Past Medical History:   Diagnosis Date    Allergic     Costochondritis 2018    Degenerative tear of left medial meniscus     Dystonia 2016    GERD (gastroesophageal reflux disease)     Hypertension     usually low but spikes    Lactose intolerance     Miscarriage ,     Urinary tract infection comes and goes blood in urine    Visual impairment posterior vitreous detachment           Past Surgical History:   Procedure Laterality Date    BLADDER SURGERY      COLONOSCOPY      DILATION AND CURETTAGE OF UTERUS      Miscarriage x2    HERNIA REPAIR      inguinal, left    [place] COLONOSCOPY FLX DX W/COLLJ SPEC WHEN PFRMD N/A 2018    Procedure: EGD AND COLONOSCOPY;  Surgeon: Nash Sanchez MD;  [facility];  Service: Gastroenterology    TONSILLECTOMY      UPPER GASTROINTESTINAL ENDOSCOPY         Past OB/Gyn History:   Patient's last menstrual period was 2021 (approximate).    Menopausal status: postmenopausal  Menopausal symptoms: None    Last Pap:  : no abnormalities  History of abnormal Pap smear: yes - ascus     Patient is currently sexually active.   STD testing: no  Current contraception: post menopausal status      Family History  Family History   Problem Relation Age of Onset    Hyperlipidemia Mother     Hypertension Mother     Liver disease Mother     Pancreatic cancer Mother     Mental illness Mother     Cancer Mother         Pancreas & MEN-1    Stroke Mother         mom had mini stroke    Colon cancer Father 70    Cancer Father         bladder cancer    Hypertension Brother     Alcohol abuse Brother     Mental illness Brother     ADD / ADHD Brother     OCD Brother     Heart disease Maternal Grandmother     Dementia Maternal Grandmother     Arthritis Maternal Grandmother     Heart disease Maternal Grandfather     Skin cancer Paternal Grandmother     Breast  cancer Paternal Grandmother         skin,bone & breast    Kidney disease Paternal Grandfather     Heart disease Maternal Uncle     Diabetes Maternal Uncle     Autoimmune disease Paternal Aunt         MS    Autoimmune disease Paternal Aunt         Lupus    Substance Abuse Neg Hx     Depression Neg Hx        Family history of uterine or ovarian cancer: no  Family history of breast cancer: yes  Family history of colon cancer: yes    Social History:  Social History     Socioeconomic History    Marital status: /Civil Union     Spouse name: Not on file    Number of children: Not on file    Years of education: Not on file    Highest education level: Not on file   Occupational History    Not on file   Tobacco Use    Smoking status: Former     Current packs/day: 0.00     Average packs/day: 1 pack/day for 8.0 years (8.0 ttl pk-yrs)     Types: Cigarettes     Start date: 1980     Quit date: 1987     Years since quittin.5     Passive exposure: Never    Smokeless tobacco: Never    Tobacco comments:     off and on not constant when I was young   Vaping Use    Vaping status: Former   Substance and Sexual Activity    Alcohol use: No    Drug use: Not Currently     Types: Marijuana     Comment: teenager rare use marijuana    Sexual activity: Yes     Partners: Male     Birth control/protection: Condom Male, Post-menopausal   Other Topics Concern    Not on file   Social History Narrative    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         Social Determinants of Health     Financial Resource Strain: Not on file   Food Insecurity: Not on file   Transportation Needs: Not on file   Physical Activity: Not on file   Stress: Not on file   Social Connections: Not on file   Intimate Partner Violence: Not on file   Housing Stability: Not on file     Domestic violence screen: negative    Allergies:  Allergies   Allergen Reactions    Sulfa Antibiotics Hives    Kenalog [Triamcinolone Acetonide] Hives    Neomycin Hives     "Penicillins      Reaction Date: 12Nov2009;     Azithromycin Rash    Levaquin [Levofloxacin] Tachycardia       Medications:    Current Outpatient Medications:     D-MANNOSE PO, Take by mouth, Disp: , Rfl:     NON FORMULARY, Femdophilus, Disp: , Rfl:     NON FORMULARY, Take 1 tablet by mouth in the morning Natures balance fruit and vegies, Disp: , Rfl:     Probiotic Product ([employer] PO), Take by mouth, Disp: , Rfl:     vitamin B-12 (CYANOCOBALAMIN) 50 MCG tablet, Take 50 mcg by mouth daily, Disp: , Rfl:     Review of Systems:  Review of Systems   Constitutional:  Negative for chills, fever and unexpected weight change.   Respiratory:  Negative for shortness of breath.    Cardiovascular:  Negative for chest pain.   Gastrointestinal:  Negative for abdominal pain, diarrhea, nausea and vomiting.   Skin:  Negative for rash.   Psychiatric/Behavioral:  Negative for dysphoric mood. The patient is not nervous/anxious.          Physical Exam:  /66 (BP Location: Right arm, Patient Position: Sitting, Cuff Size: Standard)   Ht 5' 2\" (1.575 m)   Wt 43.5 kg (96 lb)   LMP 02/27/2021 (Approximate)   BMI 17.56 kg/m²    Physical Exam  Constitutional:       Appearance: Normal appearance.   Genitourinary:      Vulva and urethral meatus normal.      No lesions in the vagina.      Right Labia: No rash or lesions.     Left Labia: No lesions or rash.     No vaginal discharge, erythema or bleeding.      Moderate vaginal atrophy present.       Right Adnexa: not tender and no mass present.     Left Adnexa: not tender and no mass present.     No cervical discharge or lesion.      Uterus is not tender.   Breasts:     Breasts are symmetrical.      Right: No mass or skin change.      Left: No mass or skin change.   HENT:      Head: Normocephalic and atraumatic.   Cardiovascular:      Rate and Rhythm: Normal rate and regular rhythm.      Heart sounds: Normal heart sounds. No murmur heard.     No friction rub. No gallop. "   Pulmonary:      Effort: Pulmonary effort is normal.      Breath sounds: Normal breath sounds. No wheezing, rhonchi or rales.   Abdominal:      General: Abdomen is flat. There is no distension.      Palpations: Abdomen is soft.      Tenderness: There is no abdominal tenderness.   Musculoskeletal:      Cervical back: Neck supple.   Lymphadenopathy:      Upper Body:      Right upper body: No axillary adenopathy.      Left upper body: No axillary adenopathy.   Neurological:      General: No focal deficit present.      Mental Status: She is alert.   Skin:     General: Skin is warm and dry.   Psychiatric:         Mood and Affect: Mood normal.         Behavior: Behavior normal.   Vitals reviewed.

## 2024-07-25 ENCOUNTER — TELEPHONE (OUTPATIENT)
Age: 59
End: 2024-07-25

## 2024-07-25 ENCOUNTER — HOSPITAL ENCOUNTER (OUTPATIENT)
Dept: MAMMOGRAPHY | Facility: HOSPITAL | Age: 59
Discharge: HOME | End: 2024-07-25
Payer: COMMERCIAL

## 2024-07-25 VITALS — BODY MASS INDEX: 17.66 KG/M2 | WEIGHT: 96 LBS | HEIGHT: 62 IN

## 2024-07-25 DIAGNOSIS — Z12.31 ENCOUNTER FOR SCREENING MAMMOGRAM FOR MALIGNANT NEOPLASM OF BREAST: ICD-10-CM

## 2024-07-25 PROCEDURE — 77063 BREAST TOMOSYNTHESIS BI: CPT

## 2024-07-25 PROCEDURE — 77067 SCR MAMMO BI INCL CAD: CPT

## 2024-08-14 ENCOUNTER — OFFICE VISIT (OUTPATIENT)
Dept: FAMILY MEDICINE CLINIC | Facility: CLINIC | Age: 59
End: 2024-08-14
Payer: COMMERCIAL

## 2024-08-14 ENCOUNTER — OFFICE VISIT (OUTPATIENT)
Dept: GASTROENTEROLOGY | Facility: AMBULARY SURGERY CENTER | Age: 59
End: 2024-08-14
Payer: COMMERCIAL

## 2024-08-14 VITALS
RESPIRATION RATE: 17 BRPM | WEIGHT: 98 LBS | SYSTOLIC BLOOD PRESSURE: 100 MMHG | BODY MASS INDEX: 18.03 KG/M2 | OXYGEN SATURATION: 96 % | TEMPERATURE: 99 F | HEART RATE: 74 BPM | HEIGHT: 62 IN | DIASTOLIC BLOOD PRESSURE: 60 MMHG

## 2024-08-14 VITALS
DIASTOLIC BLOOD PRESSURE: 81 MMHG | HEART RATE: 83 BPM | HEIGHT: 62 IN | BODY MASS INDEX: 17.89 KG/M2 | SYSTOLIC BLOOD PRESSURE: 119 MMHG | WEIGHT: 97.2 LBS | OXYGEN SATURATION: 98 %

## 2024-08-14 DIAGNOSIS — K58.2 IRRITABLE BOWEL SYNDROME WITH BOTH CONSTIPATION AND DIARRHEA: ICD-10-CM

## 2024-08-14 DIAGNOSIS — R59.1 LYMPHADENOPATHY OF HEAD AND NECK: Primary | ICD-10-CM

## 2024-08-14 DIAGNOSIS — K52.832 LYMPHOCYTIC COLITIS: Primary | ICD-10-CM

## 2024-08-14 DIAGNOSIS — K21.00 GASTROESOPHAGEAL REFLUX DISEASE WITH ESOPHAGITIS, UNSPECIFIED WHETHER HEMORRHAGE: ICD-10-CM

## 2024-08-14 PROBLEM — K21.9 GERD (GASTROESOPHAGEAL REFLUX DISEASE): Status: RESOLVED | Noted: 2018-05-31 | Resolved: 2024-08-14

## 2024-08-14 LAB
SARS-COV-2 AG UPPER RESP QL IA: NEGATIVE
VALID CONTROL: NORMAL

## 2024-08-14 PROCEDURE — 87811 SARS-COV-2 COVID19 W/OPTIC: CPT | Performed by: NURSE PRACTITIONER

## 2024-08-14 PROCEDURE — 99213 OFFICE O/P EST LOW 20 MIN: CPT | Performed by: NURSE PRACTITIONER

## 2024-08-14 PROCEDURE — 99214 OFFICE O/P EST MOD 30 MIN: CPT | Performed by: INTERNAL MEDICINE

## 2024-08-14 NOTE — PROGRESS NOTES
[facility]  Lennie Salinas 58 y.o. female MRN: [id number]            Assessment & Plan:  Biopsy findings consistent with lymphocytic colitis but was reluctant to take budesonide at that time.  Here with rectal bleeding.    1.  Rectal bleeding: Most likely secondary to internal hemorrhoids which were seen on her last colonoscopy in the setting of recent frequent bowel movements  -No further workup needed at this time  Patient was instructed to contact us if she has recurrent rectal bleeding, we can check a CBC at that time, can consider Anusol suppositories and fiber supplementation but she has been doing relatively well.    2.  Lymphocytic colitis: Seen on biopsies on her last colonoscopy  -Patient does not seem to be having symptoms of lymphocytic colitis at this time, may have improved since she has modified her diet  -I would recommend and try to reassure her that budesonide is a good and safe treatment if the symptoms recur    3 GERD: Well-controlled, not taking any medications at this time doing well from the standpoint of reflux      Lennie was seen today for follow-up.    Diagnoses and all orders for this visit:    Lymphocytic colitis    Irritable bowel syndrome with both constipation and diarrhea    Gastroesophageal reflux disease with esophagitis, unspecified whether hemorrhage            _____________________________________________________________        CC: Rectal bleeding rectal bleeding.  This is a pleasant 58-year-old female with a history of IBS with alternating diarrhea, constipation, had a history of weight loss in the past    HPI:  Lennie Salinas is a 58 y.o.female who is here for  rectal bleeding.  This is a pleasant 58-year-old female with a history of IBS with alternating diarrhea, constipation, had a history of weight loss in the past, diarrhea for which we had seen her also has had a history of reflux in the past.  Patient reports that she has been doing relatively well  we have not seen her since her last visit, she had a colonoscopy she demonstrated lymphocytic colitis, she is reluctant to take budesonide due to potential steroid side effects at that time.    She notes that she try to modify her diet, she is tried a gluten-free diet and very strict dietary recommendations and this seems to be helping her in general.  A few weeks ago due to increasing stressors he had multiple loose bowel movements which is typical of her IBS, at the end of which she noted bright red blood per rectum and what she describes as a piece of flesh or tissue that she passed but that has since resolved and she has not had any recurrent symptoms.    Otherwise doing well.  She is gained about 7 pounds, she is try to advance her diet as well as tolerated.      ROS:  The remainder of the ROS was negative except for the pertinent positives mentioned in HPI.      Allergies: Sulfa antibiotics, Kenalog [triamcinolone acetonide], Neomycin, Penicillins, Azithromycin, and Levaquin [levofloxacin]    Medications:   Current Outpatient Medications:     Cholecalciferol (VITAMIN D-3 PO), Take by mouth in the morning, Disp: , Rfl:     D-MANNOSE PO, Take by mouth, Disp: , Rfl:     NON FORMULARY, Femdophilus, Disp: , Rfl:     NON FORMULARY, Take 1 tablet by mouth in the morning Natures balance fruit and vegies, Disp: , Rfl:     Probiotic Product ([employer] PO), Take by mouth, Disp: , Rfl:     vitamin B-12 (CYANOCOBALAMIN) 50 MCG tablet, Take 50 mcg by mouth daily, Disp: , Rfl:     Past Medical History:   Diagnosis Date    Allergic     Costochondritis 05/31/2018    Degenerative tear of left medial meniscus     Dystonia 04/28/2016    GERD (gastroesophageal reflux disease)     Hypertension     usually low but spikes    Lactose intolerance     Miscarriage 1999, 2000    Urinary tract infection comes and goes blood in urine    Visual impairment posterior vitreous detachment    2020       Past Surgical History:  "  Procedure Laterality Date    BLADDER SURGERY      COLONOSCOPY      DILATION AND CURETTAGE OF UTERUS      Miscarriage x2    HERNIA REPAIR      inguinal, left    [place] COLONOSCOPY FLX DX W/COLLJ SPEC WHEN PFRMD N/A 09/12/2018    Procedure: EGD AND COLONOSCOPY;  Surgeon: Nash Sanchez MD;  Location: [facility];  Service: Gastroenterology    TONSILLECTOMY      UPPER GASTROINTESTINAL ENDOSCOPY         Family History   Problem Relation Age of Onset    Hyperlipidemia Mother     Hypertension Mother     Liver disease Mother     Pancreatic cancer Mother     Mental illness Mother     Cancer Mother         Pancreas & MEN-1    Stroke Mother         mom had mini stroke    Colon cancer Father 70    Cancer Father         bladder cancer    Heart disease Maternal Grandmother     Dementia Maternal Grandmother     Arthritis Maternal Grandmother     Heart disease Maternal Grandfather     Skin cancer Paternal Grandmother     Breast cancer Paternal Grandmother         skin,bone & breast    Kidney disease Paternal Grandfather     Hypertension Brother     Alcohol abuse Brother     Mental illness Brother     ADD / ADHD Brother     OCD Brother     Heart disease Maternal Uncle     Diabetes Maternal Uncle     Autoimmune disease Paternal Aunt         MS    Autoimmune disease Paternal Aunt         Lupus    No Known Problems Paternal Aunt     No Known Problems Maternal Aunt     Substance Abuse Neg Hx     Depression Neg Hx         reports that she quit smoking about 37 years ago. Her smoking use included cigarettes. She started smoking about 44 years ago. She has a 8 pack-year smoking history. She has never been exposed to tobacco smoke. She has never used smokeless tobacco. She reports that she does not currently use drugs after having used the following drugs: Marijuana. She reports that she does not drink alcohol.      Physical Exam:    /81 (BP Location: Left arm, Patient Position: Sitting, Cuff Size: Standard)   Pulse 83   Ht 5' 2\" " (1.575 m)   Wt 44.1 kg (97 lb 3.2 oz)   LMP 02/27/2021 (Approximate)   SpO2 98%   BMI 17.78 kg/m²     Gen: wn/wd, NAD, thin but otherwise healthy female 58-year-old female, we had seen her in the past for unintentional weight loss, diarrhea, she did have  HEENT: anicteric, MMM, no cervical LAD  CVS: RRR, no m/r/g  CHEST: CTA b/l  ABD: +BS, soft, NT,ND, no hepatosplenomegaly  EXT: no c/c/e  NEURO: aaox3  SKIN: NO rashes

## 2024-08-14 NOTE — PROGRESS NOTES
FAMILY PRACTICE OFFICE VISIT       NAME: Lennie Salinas  AGE: 58 y.o. SEX: female       : 1965        MRN: [id number]    DATE: 2024  TIME: 6:13 PM    Assessment and Plan   1. Lymphadenopathy of head and neck  -     POCT Rapid Covid Ag  -     CBC and differential; Future  -     C-reactive protein; Future  -     CBC and differential  -     C-reactive protein       There are no Patient Instructions on file for this visit.        Chief Complaint     Chief Complaint   Patient presents with    Swollen Glands     Pt being seen for right side glands from neck to her the shoulder very sore       History of Present Illness   Lennie Salinas is a 58 y.o.-year-old female who is here for c/o swollen and painful glands on right side of neck since satruday  No fevers  Hoarse voice  Has hot flashes  Right ear pain  Shooting pain on right side of head  Had a pea size lump for years  Hoarse voice is new  Some days can feel flu like symptoms      Review of Systems   Review of Systems   Constitutional:  Positive for chills and fatigue. Negative for activity change, fever and unexpected weight change.   HENT:  Positive for ear pain and voice change. Negative for congestion, postnasal drip, rhinorrhea, sore throat and trouble swallowing.    Eyes:  Negative for photophobia and visual disturbance.   Respiratory:  Negative for cough, shortness of breath and wheezing.    Cardiovascular:  Negative for chest pain and palpitations.   Gastrointestinal:  Negative for constipation, diarrhea and nausea.   Genitourinary:  Negative for dysuria and frequency.   Musculoskeletal:  Negative for arthralgias and myalgias.   Skin:  Negative for rash.   Neurological:  Negative for dizziness, light-headedness and headaches.   Hematological:  Positive for adenopathy.   Psychiatric/Behavioral:  Negative for dysphoric mood, sleep disturbance and suicidal ideas. The patient is nervous/anxious.        Active Problem List     Patient Active Problem  List   Diagnosis    Copper deficiency    Endometriosis    Idiopathic peripheral neuropathy    Malabsorption syndrome    Mild vitamin D deficiency    TACS (trigeminal autonomic cephalgias)    Small fiber neuropathy    Other hyperlipidemia    Constipation    Coccygeal pain    Seasonal allergic rhinitis due to pollen    Positive MARYELLEN (antinuclear antibody)    Anxiety    Liver cyst    Vitamin B12 deficiency    Ulnar neuropathy of right upper extremity    Other insomnia    Underweight    Hearing loss    RLS (restless legs syndrome)    Weight loss    Vasovagal episode    Lymphocytic colitis    Undifferentiated connective tissue disease (HCC)    Chronic fatigue    Sleep disturbance    Irritable bowel syndrome with both constipation and diarrhea    Primary generalized (osteo)arthritis    Fibromyalgia    Chronic pain syndrome    Tinnitus of both ears    Chest discomfort    Multiple allergies         Past Medical History:  Past Medical History:   Diagnosis Date    Allergic     Costochondritis 05/31/2018    Degenerative tear of left medial meniscus     Dystonia 04/28/2016    GERD (gastroesophageal reflux disease)     Hypertension     usually low but spikes    Lactose intolerance     Miscarriage 1999, 2000    Urinary tract infection comes and goes blood in urine    Visual impairment posterior vitreous detachment    2020       Past Surgical History:  Past Surgical History:   Procedure Laterality Date    BLADDER SURGERY      COLONOSCOPY      DILATION AND CURETTAGE OF UTERUS      Miscarriage x2    HERNIA REPAIR      inguinal, left    [place] COLONOSCOPY FLX DX W/COLLJ SPEC WHEN PFRMD N/A 09/12/2018    Procedure: EGD AND COLONOSCOPY;  Surgeon: Nash Sanchez MD;  Location: [facility];  Service: Gastroenterology    TONSILLECTOMY      UPPER GASTROINTESTINAL ENDOSCOPY         Family History:  Family History   Problem Relation Age of Onset    Hyperlipidemia Mother     Hypertension Mother     Liver disease Mother     Pancreatic cancer Mother      Mental illness Mother     Cancer Mother         Pancreas & MEN-1    Stroke Mother         mom had mini stroke    Colon cancer Father 70    Cancer Father         bladder cancer    Heart disease Maternal Grandmother     Dementia Maternal Grandmother     Arthritis Maternal Grandmother     Heart disease Maternal Grandfather     Skin cancer Paternal Grandmother     Breast cancer Paternal Grandmother         skin,bone & breast    Kidney disease Paternal Grandfather     Hypertension Brother     Alcohol abuse Brother     Mental illness Brother     ADD / ADHD Brother     OCD Brother     Heart disease Maternal Uncle     Diabetes Maternal Uncle     Autoimmune disease Paternal Aunt         MS    Autoimmune disease Paternal Aunt         Lupus    No Known Problems Paternal Aunt     No Known Problems Maternal Aunt     Substance Abuse Neg Hx     Depression Neg Hx        Social History:  Social History     Socioeconomic History    Marital status: /Civil Union     Spouse name: Not on file    Number of children: Not on file    Years of education: Not on file    Highest education level: Not on file   Occupational History    Not on file   Tobacco Use    Smoking status: Former     Current packs/day: 0.00     Average packs/day: 1 pack/day for 8.0 years (8.0 ttl pk-yrs)     Types: Cigarettes     Start date: 1980     Quit date: 1987     Years since quittin.6     Passive exposure: Never    Smokeless tobacco: Never    Tobacco comments:     off and on not constant when I was young   Vaping Use    Vaping status: Former   Substance and Sexual Activity    Alcohol use: No    Drug use: Not Currently     Types: Marijuana     Comment: teenager rare use marijuana    Sexual activity: Yes     Partners: Male     Birth control/protection: Condom Male, Post-menopausal   Other Topics Concern    Not on file   Social History Narrative    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         Social Determinants of Health     Financial  Resource Strain: Not on file   Food Insecurity: Not on file   Transportation Needs: Not on file   Physical Activity: Not on file   Stress: Not on file   Social Connections: Not on file   Intimate Partner Violence: Not on file   Housing Stability: Not on file       Objective     Vitals:    08/14/24 1745   BP: 100/60   Pulse: 74   Resp: 17   Temp: 99 °F (37.2 °C)   SpO2: 96%     Wt Readings from Last 3 Encounters:   08/14/24 44.5 kg (98 lb)   08/14/24 44.1 kg (97 lb 3.2 oz)   07/25/24 43.5 kg (96 lb)       Physical Exam  Vitals and nursing note reviewed.   Constitutional:       Appearance: Normal appearance.   HENT:      Head: Normocephalic and atraumatic.      Right Ear: Tympanic membrane, ear canal and external ear normal.      Left Ear: Tympanic membrane, ear canal and external ear normal.      Nose: Nose normal.      Mouth/Throat:      Mouth: Mucous membranes are moist.   Eyes:      Conjunctiva/sclera: Conjunctivae normal.   Cardiovascular:      Rate and Rhythm: Normal rate and regular rhythm.      Heart sounds: Normal heart sounds.   Pulmonary:      Effort: Pulmonary effort is normal.      Breath sounds: Normal breath sounds.   Abdominal:      General: Bowel sounds are normal.      Palpations: Abdomen is soft.   Musculoskeletal:         General: Normal range of motion.   Lymphadenopathy:      Head:      Right side of head: Submandibular, tonsillar, preauricular and posterior auricular adenopathy present.      Cervical: Cervical adenopathy present.   Skin:     General: Skin is warm and dry.      Capillary Refill: Capillary refill takes less than 2 seconds.   Neurological:      General: No focal deficit present.      Mental Status: She is alert and oriented to person, place, and time.   Psychiatric:         Mood and Affect: Mood normal.         Behavior: Behavior normal.         Thought Content: Thought content normal.         Judgment: Judgment normal.         Pertinent Laboratory/Diagnostic Studies:  Lab Results  "  Component Value Date    BUN 13 06/11/2024    CREATININE 0.85 06/11/2024    CALCIUM 9.3 06/11/2024    K 4.3 06/11/2024    CO2 28 06/11/2024     06/11/2024     Lab Results   Component Value Date    ALT 18 06/11/2024    AST 22 06/11/2024    ALKPHOS 85 06/11/2024       Lab Results   Component Value Date    WBC 5.01 08/15/2024    HGB 15.0 08/15/2024    HCT 44.8 08/15/2024    MCV 91 08/15/2024     08/15/2024       No results found for: \"TSH\"    No results found for: \"CHOL\"  Lab Results   Component Value Date    TRIG 75 04/03/2024     Lab Results   Component Value Date    HDL 85 04/03/2024     Lab Results   Component Value Date    LDLCALC 162 (H) 04/03/2024     Lab Results   Component Value Date    HGBA1C 5.3 07/01/2022       Results for orders placed or performed in visit on 08/14/24   POCT Rapid Covid Ag   Result Value Ref Range    POCT SARS-CoV-2 Ag Negative Negative    VALID CONTROL Valid        Orders Placed This Encounter   Procedures    CBC and differential    C-reactive protein    POCT Rapid Covid Ag       ALLERGIES:  Allergies   Allergen Reactions    Sulfa Antibiotics Hives    Kenalog [Triamcinolone Acetonide] Hives    Neomycin Hives    Penicillins      Reaction Date: 12Nov2009;     Azithromycin Rash    Levaquin [Levofloxacin] Tachycardia       Current Medications     Current Outpatient Medications   Medication Sig Dispense Refill    Cholecalciferol (VITAMIN D-3 PO) Take by mouth in the morning      D-MANNOSE PO Take by mouth      NON FORMULARY Femdophilus      NON FORMULARY Take 1 tablet by mouth in the morning Natures balance fruit and vegies      Probiotic Product ("[employer]A [employer]E [employer]ALTH PO) Take by mouth      vitamin B-12 (CYANOCOBALAMIN) 50 MCG tablet Take 50 mcg by mouth daily       No current facility-administered medications for this visit.         Health Maintenance     Health Maintenance   Topic Date Due    DTaP,Tdap,and Td Vaccines (1 - Tdap) Never done    Zoster Vaccine (1 of 2) Never " done    PT PLAN OF CARE  06/04/2023    COVID-19 Vaccine (3 - 2023-24 season) 09/01/2023    Influenza Vaccine (1) 09/01/2024    Annual Physical  10/17/2024    Depression Screening  04/17/2025    Breast Cancer Screening: Mammogram  07/25/2025    RSV Vaccine Age 60+ Years (1 - 1-dose 60+ series) 09/10/2026    Cervical Cancer Screening  07/03/2028    Colorectal Cancer Screening  09/11/2028    HIV Screening  Completed    Hepatitis C Screening  Completed    RSV Vaccine age 0-20 Months  Aged Out    Pneumococcal Vaccine: Pediatrics (0 to 5 Years) and At-Risk Patients (6 to 64 Years)  Aged Out    HIB Vaccine  Aged Out    IPV Vaccine  Aged Out    Hepatitis A Vaccine  Aged Out    Meningococcal ACWY Vaccine  Aged Out    HPV Vaccine  Aged Out     Immunization History   Administered Date(s) Administered    COVID-19 MODERNA VACC 0.5 ML IM 07/30/2021, 08/27/2021          NEGRA Rogers

## 2024-08-14 NOTE — PATIENT INSTRUCTIONS
You can take imodium as needed before going out   If diarrhea worsens, call us, may need the steroid  If you see blood again, call us and we will check blood work

## 2024-08-15 ENCOUNTER — APPOINTMENT (OUTPATIENT)
Dept: LAB | Facility: CLINIC | Age: 59
End: 2024-08-15
Payer: COMMERCIAL

## 2024-08-15 DIAGNOSIS — R59.1 LYMPHADENOPATHY: ICD-10-CM

## 2024-08-15 LAB
BASOPHILS # BLD AUTO: 0.04 THOUSANDS/ÂΜL (ref 0–0.1)
BASOPHILS NFR BLD AUTO: 1 % (ref 0–1)
CRP SERPL QL: 2.3 MG/L
EOSINOPHIL # BLD AUTO: 0.05 THOUSAND/ÂΜL (ref 0–0.61)
EOSINOPHIL NFR BLD AUTO: 1 % (ref 0–6)
ERYTHROCYTE [DISTWIDTH] IN BLOOD BY AUTOMATED COUNT: 13.1 % (ref 11.6–15.1)
HCT VFR BLD AUTO: 44.8 % (ref 34.8–46.1)
HGB BLD-MCNC: 15 G/DL (ref 11.5–15.4)
IMM GRANULOCYTES # BLD AUTO: 0.02 THOUSAND/UL (ref 0–0.2)
IMM GRANULOCYTES NFR BLD AUTO: 0 % (ref 0–2)
LYMPHOCYTES # BLD AUTO: 2.36 THOUSANDS/ÂΜL (ref 0.6–4.47)
LYMPHOCYTES NFR BLD AUTO: 47 % (ref 14–44)
MCH RBC QN AUTO: 30.4 PG (ref 26.8–34.3)
MCHC RBC AUTO-ENTMCNC: 33.5 G/DL (ref 31.4–37.4)
MCV RBC AUTO: 91 FL (ref 82–98)
MONOCYTES # BLD AUTO: 0.6 THOUSAND/ÂΜL (ref 0.17–1.22)
MONOCYTES NFR BLD AUTO: 12 % (ref 4–12)
NEUTROPHILS # BLD AUTO: 1.94 THOUSANDS/ÂΜL (ref 1.85–7.62)
NEUTS SEG NFR BLD AUTO: 39 % (ref 43–75)
NRBC BLD AUTO-RTO: 0 /100 WBCS
PLATELET # BLD AUTO: 222 THOUSANDS/UL (ref 149–390)
PMV BLD AUTO: 12.1 FL (ref 8.9–12.7)
RBC # BLD AUTO: 4.93 MILLION/UL (ref 3.81–5.12)
WBC # BLD AUTO: 5.01 THOUSAND/UL (ref 4.31–10.16)

## 2024-08-15 PROCEDURE — 86140 C-REACTIVE PROTEIN: CPT

## 2024-08-15 PROCEDURE — 85025 COMPLETE CBC W/AUTO DIFF WBC: CPT

## 2024-08-15 PROCEDURE — 36415 COLL VENOUS BLD VENIPUNCTURE: CPT

## 2024-10-17 ENCOUNTER — OFFICE VISIT (OUTPATIENT)
Dept: FAMILY MEDICINE CLINIC | Facility: CLINIC | Age: 59
End: 2024-10-17
Payer: COMMERCIAL

## 2024-10-17 VITALS
TEMPERATURE: 97.7 F | RESPIRATION RATE: 16 BRPM | HEART RATE: 76 BPM | HEIGHT: 63 IN | OXYGEN SATURATION: 100 % | WEIGHT: 98.4 LBS | DIASTOLIC BLOOD PRESSURE: 78 MMHG | SYSTOLIC BLOOD PRESSURE: 114 MMHG | BODY MASS INDEX: 17.43 KG/M2

## 2024-10-17 DIAGNOSIS — K58.2 IRRITABLE BOWEL SYNDROME WITH BOTH CONSTIPATION AND DIARRHEA: ICD-10-CM

## 2024-10-17 DIAGNOSIS — F41.9 ANXIETY: ICD-10-CM

## 2024-10-17 DIAGNOSIS — Z00.00 ANNUAL PHYSICAL EXAM: Primary | ICD-10-CM

## 2024-10-17 DIAGNOSIS — K52.832 LYMPHOCYTIC COLITIS: ICD-10-CM

## 2024-10-17 DIAGNOSIS — R59.0 CERVICAL LYMPHADENOPATHY: ICD-10-CM

## 2024-10-17 DIAGNOSIS — M54.12 CERVICAL RADICULOPATHY: ICD-10-CM

## 2024-10-17 DIAGNOSIS — R76.8 POSITIVE ANA (ANTINUCLEAR ANTIBODY): ICD-10-CM

## 2024-10-17 PROCEDURE — 99214 OFFICE O/P EST MOD 30 MIN: CPT | Performed by: FAMILY MEDICINE

## 2024-10-17 PROCEDURE — 99396 PREV VISIT EST AGE 40-64: CPT | Performed by: FAMILY MEDICINE

## 2024-10-17 NOTE — PATIENT INSTRUCTIONS
Patient Education     Thoracic Outlet Syndrome Exercises   About this topic   The thoracic outlet is the area between your collarbone and first rib. Blood vessels, nerves, and muscles run through this small space. Pressure on these structures can cause signs like numbness or tingling in the fingers. You may also have a weak  or your hand feels cold. These problems may be diagnosed as thoracic outlet syndrome or TOS. Exercises can help with your signs or to fix this problem.  General   Before starting with a program, ask your doctor if you are healthy enough to do these exercises. Your doctor may have you work with a  or physical therapist to make a safe exercise program to meet your needs.  Stretching Exercises   Stretching exercises keep your muscles flexible. They also stop them from getting tight. Start by doing each of these stretches 2 to 3 times. In order for your body to make changes, you will need to hold these stretches for 20 to 30 seconds. Try to do the stretches 2 to 3 times each day. Do all exercises slowly.  Neck stretches ? Put your left hand on top of your head. Your other arm can be at your side or behind your back. Pull your head toward your left shoulder until you feel a gentle stretch on the right side of your neck. Repeat on the other side. Also, try this stretch by pulling in a diagonal direction. With your left hand on top of your head, pull your head down towards the direction of your left knee. You should feel this stretch towards the back on the right side of your neck. Repeat on the other side.  Scalene stretches ? Grasp your head with the hand opposite the side you want to stretch. Pull your head to the side until you feel a stretch. Now, slowly turn your head so your chin is pointed upwards.  Chin tucks ? Stand straight or lie down on your back. Tuck your chin in and lengthen the back of your neck. Return to the starting position and repeat. It may help to stand up against a  "wall during this exercise. Try gently pushing your chin with two fingers while trying to flatten your neck against the wall. If you do this exercise lying down, try using a small rolled up washcloth under your neck. Push down into the washcloth when tucking in your chin.  Corner stretches:  T position ? Stand about one foot away from a corner. Bend your elbows and bring your upper arms to shoulder height. Rest your arms on the wall. Keep your back straight and gently lean forward until you feel a stretch in the front of your chest and shoulders.  V position ? Stand about one foot away from a corner. With your elbows straight, put only your hands on the wall and make a letter \"V\". Keep your back straight and gently lean forward until you feel a stretch in the front of your chest and shoulders.  Table stretches ? Sitting on an office chair with wheels works well for these exercises. If you don’t have a chair with wheels, use a regular chair and place a towel under your arm. Sit about a foot away from the table or desk. The last exercise is done standing in front of a table.  Forward ? Put your arm on the table. Keep your elbow straight and lean forward until you feel a stretch. Now, lean further forward or push the chair backwards to get more of a stretch.  Sideways ? Turn the chair sideways. Put your arm on the table. Make sure your thumb is pointed up. Keep your elbow straight and lean towards the table until you feel a stretch. Now, lean further or push the chair away from the table to get more of a stretch.  Bent arm rotations ? Turn the chair sideways. Put your lower arm on the table with your elbow bent at a 90 degree angle. Lean forward until you feel a stretch.  Behind the back ? Stand with your back about 18 inches (45 cm) away from a table. Place your hands on the table, palms down and fingers facing forward. Bend your elbows and knees and lower your body down towards the floor until you feel a stretch in " your shoulder.  Strengthening Exercises   Strengthening exercises keep your muscles firm and strong. Start by repeating each exercise 2 to 3 times. Work up to doing each exercise 10 times. Try to do the exercises 2 to 3 times each day. Hold each exercise for 3 to 5 seconds. Do all exercises slowly.  Shoulder blade squeezes ? Pinch your shoulder blades together on your upper back and hold 3 to 5 seconds. Relax.               What will the results be?   Less pain, cramping, numbness, or tingling  Less swelling, stiffness, tiredness, or heaviness  Better range of motion  Stronger   Easier to do daily activities  Hand no longer feels cold  Helpful tips   Do not carry heavy bags, backpacks, or purses on your shoulder.  Avoid having your arms up over your head for a long time.  Use good posture.  Always warm up before stretching. Heated muscles stretch much easier than cool muscles. Stretching cool muscles can lead to injury.  Try walking and swinging your arms at an easy pace for a few minutes to warm up your muscles. Do this again after exercising.  Never bounce when doing stretches.  Doing exercises before a meal may be a good way to get into a routine.  Exercise may be slightly uncomfortable, but you should not have sharp pains. If you do get sharp pains, stop what you are doing. If the sharp pains continue, call your doctor.  Last Reviewed Date   2021-09-13  Consumer Information Use and Disclaimer   This generalized information is a limited summary of diagnosis, treatment, and/or medication information. It is not meant to be comprehensive and should be used as a tool to help the user understand and/or assess potential diagnostic and treatment options. It does NOT include all information about conditions, treatments, medications, side effects, or risks that may apply to a specific patient. It is not intended to be medical advice or a substitute for the medical advice, diagnosis, or treatment of a health care  "provider based on the health care provider's examination and assessment of a patient’s specific and unique circumstances. Patients must speak with a health care provider for complete information about their health, medical questions, and treatment options, including any risks or benefits regarding use of medications. This information does not endorse any treatments or medications as safe, effective, or approved for treating a specific patient. Up[employer]Date, Inc[employer] and its affiliates disclaim any warranty or liability relating to this information or the use thereof. The use of this information is governed by the Terms of Use, available at https://www.Hem[employer].PRX/en/know/clinical-effectiveness-terms   Copyright   Copyright © 2024 UpToDate, Inc. and its affiliates and/or licensors. All rights reserved.      Patient Education     Routine physical for adults   The Basics   Written by the doctors and editors at [employer]   What is a physical? -- A physical is a routine visit, or \"check-up,\" with your doctor. You might also hear it called a \"wellness visit\" or \"preventive visit.\"  During each visit, the doctor will:   Ask about your physical and mental health   Ask about your habits, behaviors, and lifestyle   Do an exam   Give you vaccines if needed   Talk to you about any medicines you take   Give advice about your health   Answer your questions  Getting regular check-ups is an important part of taking care of your health. It can help your doctor find and treat any problems you have. But it's also important for preventing health problems.  A routine physical is different from a \"sick visit.\" A sick visit is when you see a doctor because of a health concern or problem. Since physicals are scheduled ahead of time, you can think about what you want to ask the doctor.  How often should I get a physical? -- It depends on your age and health. In general, for people age 21 years and older:   If you are younger than 50 years, you " "might be able to get a physical every 3 years.   If you are 50 years or older, your doctor might recommend a physical every year.  If you have an ongoing health condition, like diabetes or high blood pressure, your doctor will probably want to see you more often.  What happens during a physical? -- In general, each visit will include:   Physical exam - The doctor or nurse will check your height, weight, heart rate, and blood pressure. They will also look at your eyes and ears. They will ask about how you are feeling and whether you have any symptoms that bother you.   Medicines - It's a good idea to bring a list of all the medicines you take to each doctor visit. Your doctor will talk to you about your medicines and answer any questions. Tell them if you are having any side effects that bother you. You should also tell them if you are having trouble paying for any of your medicines.   Habits and behaviors - This includes:   Your diet   Your exercise habits   Whether you smoke, drink alcohol, or use drugs   Whether you are sexually active   Whether you feel safe at home  Your doctor will talk to you about things you can do to improve your health and lower your risk of health problems. They will also offer help and support. For example, if you want to quit smoking, they can give you advice and might prescribe medicines. If you want to improve your diet or get more physical activity, they can help you with this, too.   Lab tests, if needed - The tests you get will depend on your age and situation. For example, your doctor might want to check your:   Cholesterol   Blood sugar   Iron level   Vaccines - The recommended vaccines will depend on your age, health, and what vaccines you already had. Vaccines are very important because they can prevent certain serious or deadly infections.   Discussion of screening - \"Screening\" means checking for diseases or other health problems before they cause symptoms. Your doctor can " recommend screening based on your age, risk, and preferences. This might include tests to check for:   Cancer, such as breast, prostate, cervical, ovarian, colorectal, prostate, lung, or skin cancer   Sexually transmitted infections, such as chlamydia and gonorrhea   Mental health conditions like depression and anxiety  Your doctor will talk to you about the different types of screening tests. They can help you decide which screenings to have. They can also explain what the results might mean.   Answering questions - The physical is a good time to ask the doctor or nurse questions about your health. If needed, they can refer you to other doctors or specialists, too.  Adults older than 65 years often need other care, too. As you get older, your doctor will talk to you about:   How to prevent falling at home   Hearing or vision tests   Memory testing   How to take your medicines safely   Making sure that you have the help and support you need at home  All topics are updated as new evidence becomes available and our peer review process is complete.  This topic retrieved from [employer] on: May 02, 2024.  Topic 198816 Version 1.0  Release: 32.4.3 - C32.122  © 2024 UpToDate, Inc. and/or its affiliates. All rights reserved.  Consumer Information Use and Disclaimer   Disclaimer: This generalized information is a limited summary of diagnosis, treatment, and/or medication information. It is not meant to be comprehensive and should be used as a tool to help the user understand and/or assess potential diagnostic and treatment options. It does NOT include all information about conditions, treatments, medications, side effects, or risks that may apply to a specific patient. It is not intended to be medical advice or a substitute for the medical advice, diagnosis, or treatment of a health care provider based on the health care provider's examination and assessment of a patient's specific and unique circumstances. Patients must  speak with a health care provider for complete information about their health, medical questions, and treatment options, including any risks or benefits regarding use of medications. This information does not endorse any treatments or medications as safe, effective, or approved for treating a specific patient. UpToDate, Inc. and its affiliates disclaim any warranty or liability relating to this information or the use thereof.The use of this information is governed by the Terms of Use, available at https://www.w[employer]tersB[employer]ideouwer.com/en/know/clinical-effectiveness-terms. 2024© UpToDate, Inc. and its affiliates and/or licensors. All rights reserved.  Copyright   © 2024 UpToDate, Inc. and/or its affiliates. All rights reserved.

## 2024-10-17 NOTE — ASSESSMENT & PLAN NOTE
-Noting some neck pain with some radiation numbness and tingling down into her right arm.  Denies any injury or trauma.  -Home exercises reviewed her neck and tight trapezius muscles.  -Discussed formal physical therapy, patient to trial home exercise first.  -Can consider imaging if no improvement.  -Follow-up as needed.

## 2024-10-17 NOTE — PROGRESS NOTES
Adult Annual Physical  Name: Lennie Salinas      : 1965      MRN: [id number]  Encounter Provider: See Douglas DO  Encounter Date: 10/17/2024   Encounter department: [facility]    Assessment & Plan  Annual physical exam         Cervical lymphadenopathy  -Right-sided lymphadenopathy.  Nontender.  Notes he has been like this for few months.  Denies any difficulty swallowing, no feeling something stuck in her throat.  No unwanted weight loss or night sweats.    -Will get ultrasound to evaluate.  Orders:    US head neck soft tissue; Future    Cervical radiculopathy  -Noting some neck pain with some radiation numbness and tingling down into her right arm.  Denies any injury or trauma.  -Home exercises reviewed her neck and tight trapezius muscles.  -Discussed formal physical therapy, patient to trial home exercise first.  -Can consider imaging if no improvement.  -Follow-up as needed.       Irritable bowel syndrome with both constipation and diarrhea  - Much improved.  Continue anti-inflammatory diet as recommended.         Lymphocytic colitis  - History of a previous colonoscopy.  Following with GI.  Symptoms have significantly improved since altering her diet.  Continue with anti-inflammatory diet         Anxiety  - Stable off medications.  Denies any SI or HI.  Continue behavioral techniques as recommended.         Positive MARYELLEN (antinuclear antibody)  Following with rheumatology.           Immunizations and preventive care screenings were discussed with patient today. Appropriate education was printed on patient's after visit summary.    Counseling:  Alcohol/drug use: discussed moderation in alcohol intake, the recommendations for healthy alcohol use, and avoidance of illicit drug use.  Dental Health: discussed importance of regular tooth brushing, flossing, and dental visits.  Injury prevention: discussed safety/seat belts, safety helmets, smoke detectors, carbon monoxide detectors,  and smoking near bedding or upholstery.  Sexual health: discussed sexually transmitted diseases, partner selection, use of condoms, avoidance of unintended pregnancy, and contraceptive alternatives.  Exercise: the importance of regular exercise/physical activity was discussed. Recommend exercise 3-5 times per week for at least 30 minutes.          History of Present Illness     Adult Annual Physical:  Patient presents for annual physical.     Diet and Physical Activity:  - Diet/Nutrition: consuming 3-5 servings of fruits/vegetables daily and well balanced diet.  - Exercise: walking.    Depression Screening:  - PHQ-2 Score: 0    General Health:  - Sleep: sleeps well and 7-8 hours of sleep on average.  - Hearing: normal hearing bilateral ears.  - Vision: goes for regular eye exams.  - Dental: brushes teeth twice daily.    /GYN Health:  - Follows with GYN: yes.   - Menopause: postmenopausal.   - History of STDs: no    Advanced Care Planning:  - Has an advanced directive?: no    - Has a durable medical POA?: no    - ACP document given to patient?: no      Review of Systems   Constitutional:  Negative for chills and fever.   HENT:  Negative for ear pain and sore throat.    Eyes:  Negative for pain and visual disturbance.   Respiratory:  Negative for cough and shortness of breath.    Cardiovascular:  Negative for chest pain and palpitations.   Gastrointestinal:  Negative for abdominal pain and vomiting.   Endocrine: Negative for polydipsia and polyuria.   Genitourinary:  Negative for dysuria and hematuria.   Musculoskeletal:  Positive for neck pain. Negative for arthralgias and back pain.   Skin:  Negative for color change and rash.   Neurological:  Negative for dizziness, seizures, syncope and headaches.   Psychiatric/Behavioral:  Negative for decreased concentration, sleep disturbance and suicidal ideas. The patient is not nervous/anxious.    All other systems reviewed and are negative.        Objective     /78  "(BP Location: Left arm, Patient Position: Sitting, Cuff Size: Standard)   Pulse 76   Temp 97.7 °F (36.5 °C) (Temporal)   Resp 16   Ht 5' 2.76\" (1.594 m)   Wt 44.6 kg (98 lb 6.4 oz)   LMP 02/27/2021 (Approximate)   SpO2 100%   BMI 17.57 kg/m²     Physical Exam  Vitals and nursing note reviewed.   Constitutional:       General: She is not in acute distress.     Appearance: Normal appearance.   HENT:      Head: Normocephalic and atraumatic.      Right Ear: Tympanic membrane and external ear normal.      Left Ear: Tympanic membrane and external ear normal.      Nose: Nose normal.      Mouth/Throat:      Mouth: Mucous membranes are moist.   Eyes:      Extraocular Movements: Extraocular movements intact.      Conjunctiva/sclera: Conjunctivae normal.      Pupils: Pupils are equal, round, and reactive to light.   Cardiovascular:      Rate and Rhythm: Normal rate and regular rhythm.      Pulses: Normal pulses.      Heart sounds: No murmur heard.  Pulmonary:      Effort: Pulmonary effort is normal.      Breath sounds: Normal breath sounds. No wheezing, rhonchi or rales.   Abdominal:      General: Bowel sounds are normal.      Palpations: Abdomen is soft.      Tenderness: There is no abdominal tenderness. There is no guarding.   Musculoskeletal:         General: Normal range of motion.      Cervical back: Normal range of motion. No tenderness.      Right lower leg: No edema.      Left lower leg: No edema.   Lymphadenopathy:      Cervical: Cervical adenopathy present.   Skin:     General: Skin is warm.      Capillary Refill: Capillary refill takes less than 2 seconds.   Neurological:      General: No focal deficit present.      Mental Status: She is alert and oriented to person, place, and time.   Psychiatric:         Mood and Affect: Mood normal.         Behavior: Behavior normal.         "

## 2024-10-23 ENCOUNTER — HOSPITAL ENCOUNTER (OUTPATIENT)
Dept: ULTRASOUND IMAGING | Facility: HOSPITAL | Age: 59
Discharge: HOME | End: 2024-10-23
Attending: FAMILY MEDICINE
Payer: COMMERCIAL

## 2024-10-23 DIAGNOSIS — R59.0 CERVICAL LYMPHADENOPATHY: ICD-10-CM

## 2024-10-23 PROCEDURE — 76536 US EXAM OF HEAD AND NECK: CPT

## 2024-10-24 ENCOUNTER — RA CDI HCC (OUTPATIENT)
Dept: OTHER | Facility: HOSPITAL | Age: 59
End: 2024-10-24

## 2024-10-24 NOTE — PROGRESS NOTES
HCC coding opportunities       Chart reviewed, no opportunity found: CHART REVIEWED, NO OPPORTUNITY FOUND        Patients Insurance        Commercial Insurance: [employer]

## 2024-10-29 ENCOUNTER — TELEPHONE (OUTPATIENT)
Age: 59
End: 2024-10-29

## 2024-10-29 NOTE — TELEPHONE ENCOUNTER
Message sent to provider.  
[facility] reading room called with significant findings. Warm transfer to the office.    
no

## 2024-10-29 NOTE — ASSESSMENT & PLAN NOTE
- Stable off medications.  Denies any SI or HI.  Continue behavioral techniques as recommended.

## 2024-10-29 NOTE — ASSESSMENT & PLAN NOTE
- History of a previous colonoscopy.  Following with GI.  Symptoms have significantly improved since altering her diet.  Continue with anti-inflammatory diet

## 2024-10-30 DIAGNOSIS — K11.8 PAROTID MASS: Primary | ICD-10-CM

## 2024-10-31 ENCOUNTER — OFFICE VISIT (OUTPATIENT)
Dept: FAMILY MEDICINE CLINIC | Facility: CLINIC | Age: 59
End: 2024-10-31
Payer: COMMERCIAL

## 2024-10-31 DIAGNOSIS — M99.02 SOMATIC DYSFUNCTION OF THORACIC REGION: ICD-10-CM

## 2024-10-31 DIAGNOSIS — M99.01 SOMATIC DYSFUNCTION OF CERVICAL REGION: ICD-10-CM

## 2024-10-31 DIAGNOSIS — M99.00 SOMATIC DYSFUNCTION OF HEAD REGION: ICD-10-CM

## 2024-10-31 DIAGNOSIS — M99.07 SOMATIC DYSFUNCTION OF UPPER EXTREMITY: ICD-10-CM

## 2024-10-31 DIAGNOSIS — M99.08 SOMATIC DYSFUNCTION OF RIB CAGE REGION: ICD-10-CM

## 2024-10-31 DIAGNOSIS — M54.12 CERVICAL RADICULOPATHY: Primary | ICD-10-CM

## 2024-10-31 PROCEDURE — 99213 OFFICE O/P EST LOW 20 MIN: CPT | Performed by: FAMILY MEDICINE

## 2024-10-31 PROCEDURE — 98927 OSTEOPATH MANJ 5-6 REGIONS: CPT | Performed by: FAMILY MEDICINE

## 2024-10-31 NOTE — PROGRESS NOTES
The Assessment & Plan     This is a 59 y.o. female who presents for OMT follow-up for:  1. Cervical radiculopathy  OMT      2. Somatic dysfunction of head region  OMT      3. Somatic dysfunction of cervical region  OMT      4. Somatic dysfunction of thoracic region  OMT      5. Somatic dysfunction of rib cage region  OMT      6. Somatic dysfunction of upper extremity  OMT           1. Patient tolerated OMT well for the above problems,  advised patient to drink fluids and can use NSAID for soreness after treatment     2. OMT Follow up in 3 weeks.    Subjective     Lennie Salinas is a 59 y.o. female and is here for a OMT follow up. The patient reports continues with neck pain right greater than left.  Has been doing home exercise program that we reviewed and does have some improvement.  Does note flares up after she is overusing.  Does get some tingling sensation down into her arm.  No weakness.  No headaches, no visual changes.  No red flag symptoms.    Is the patient taking Pain medication? yes  Has the patient completed physical therapy for this condition? no  Did Patient symptoms improve from last OMT appointment?  Initial visit    The following portions of the patient's history were reviewed and updated as appropriate: allergies, current medications, past family history, past medical history, past social history, past surgical history, and problem list.    Review of Systems  Review of Systems   Constitutional:  Negative for chills and fever.   HENT:  Negative for ear pain and sore throat.    Eyes:  Negative for pain and visual disturbance.   Respiratory:  Negative for cough and shortness of breath.    Cardiovascular:  Negative for chest pain and palpitations.   Gastrointestinal:  Negative for abdominal pain and vomiting.   Genitourinary:  Negative for dysuria and hematuria.   Musculoskeletal:  Positive for neck pain. Negative for arthralgias and back pain.   Skin:  Negative for color change and rash.    Neurological:  Negative for seizures and syncope.   Psychiatric/Behavioral:  Negative for decreased concentration and sleep disturbance. The patient is not nervous/anxious.    All other systems reviewed and are negative.        Objective     OMT Exam     OMT    Performed by: See Douglas DO  Authorized by: See Douglas DO  Universal Protocol:  procedure performed by consultantConsent: Verbal consent obtained.  Consent given by: patient  Patient identity confirmed: verbally with patient      Procedure Details:     Region evaluated and treated:  Head, Cervical, Thoracic, Ribs and Right Extremities    Extremity Information  Extremities: right upper extremity    Thoracic Information  Thoracic Region: T1 - T4  Head Details:     Examination Method:  Tissue Texture Change, Stability, Laxity, Effusions, Tone, Range of Motion, Contracture, Asymmetry, Misalignment, Crepitation, Defects, Masses and Tenderness, Pain    Severity:  Moderate    Osteopathic Findings:  - OA FSRRL  - R inion tender point    Treatment Method:  Counterstrain Treatment, Muscle Energy Treatment, Myofascial Release Treatment and Soft Tissue Treatment    Response:  Improved - The somatic dysfunction is improved but not completely resolved.    Cervical Details:     Examination Method:  Tissue Texture Change, Stability, Laxity, Effusions, Tone, Range of Motion, Contracture, Tenderness, Pain and Asymmetry, Misalignment, Crepitation, Defects, Masses    Severity:  Moderate    Osteopathic Findings:  - Bilateral paracervical muscle hypertonicity, right greater than left  -Bilateral scalene muscle hypertonicity, right greater than left  -Right: C3, C5, C7 tender points noted    Treatment Method:  Counterstrain Treatment, Muscle Energy Treatment, Myofascial Release Treatment and Soft Tissue Treatment    Response:  Improved - The somatic dysfunction is improved but not completely resolved.    Thoracic T1 - T4 details:     Examination Method:  Tissue  Texture Change, Stability, Laxity, Effusions, Tone, Range of Motion, Contracture, Asymmetry, Misalignment, Crepitation, Defects, Masses and Tenderness, Pain    Severity:  Moderate    Osteopathic Findings:  - Bilateral paraspinal muscle hypertonicity, right greater than left  -Bilateral trapezius muscle hypertonicity, recurrent left  -T4 flexed side bent right rotated right with tender point on the right    Treatment Method:  Counterstrain Treatment, Muscle Energy Treatment, Myofascial Release Treatment and Soft Tissue Treatment    Response:  Improved    Right Upper Extremity details:     Examination Method:  Tissue Texture Change, Stability, Laxity, Effusions, Tone, Range of Motion, Contracture, Asymmetry, Misalignment, Crepitation, Defects, Masses and Tenderness, Pain    Severity:  Moderate    Osteopathic Findings:  - Right deltoid muscle hypertonicity and tender point noted.    Treatment Method:  Counterstrain Treatment, Muscle Energy Treatment and Soft Tissue Treatment    Response:  Improved - The somatic dysfunction is improved but not completely resolved.    Ribs details:     Examination Method:  Tissue Texture Change, Stability, Laxity, Effusions, Tone, Range of Motion, Contracture, Asymmetry, Misalignment, Crepitation, Defects, Masses and Tenderness, Pain    Severity:  Moderate    Osteopathic Findings:  Bilateral first rib elevations with tender points noted    Treatment Method:  Articulatory Treatment, Muscle Energy Treatment and Soft Tissue Treatment    Response:  Improved - The somatic dysfunction is improved but not completely resolved.    Total Regions Treated:  5

## 2024-11-01 ENCOUNTER — TELEPHONE (OUTPATIENT)
Age: 59
End: 2024-11-01

## 2024-11-01 NOTE — TELEPHONE ENCOUNTER
Our prior auth team should be working on this as we don't do them in the office. Patient will here from either us or the insurance company if not approved

## 2024-11-01 NOTE — TELEPHONE ENCOUNTER
"Pt called in stating she will need a \"Pre Cert\" for her upcoming CT scans that Dr. Douglas ordered. She does have [employer], but it is a private insurance. She provided the following information. Funds office phone number [phone], and ask for Judy. Case number : [id number].  "

## 2024-11-12 ENCOUNTER — PATIENT MESSAGE (OUTPATIENT)
Dept: FAMILY MEDICINE CLINIC | Facility: CLINIC | Age: 59
End: 2024-11-12

## 2024-11-12 DIAGNOSIS — K11.8 PAROTID MASS: Primary | ICD-10-CM

## 2024-11-19 DIAGNOSIS — R59.0 CERVICAL LYMPHADENOPATHY: ICD-10-CM

## 2024-11-19 DIAGNOSIS — K11.8 PAROTID MASS: Primary | ICD-10-CM

## 2024-11-22 ENCOUNTER — OFFICE VISIT (OUTPATIENT)
Dept: FAMILY MEDICINE CLINIC | Facility: CLINIC | Age: 59
End: 2024-11-22
Payer: COMMERCIAL

## 2024-11-22 DIAGNOSIS — M99.00 SOMATIC DYSFUNCTION OF HEAD REGION: ICD-10-CM

## 2024-11-22 DIAGNOSIS — M99.01 SOMATIC DYSFUNCTION OF CERVICAL REGION: ICD-10-CM

## 2024-11-22 DIAGNOSIS — M99.08 SOMATIC DYSFUNCTION OF RIB CAGE REGION: ICD-10-CM

## 2024-11-22 DIAGNOSIS — M54.12 CERVICAL RADICULOPATHY: Primary | ICD-10-CM

## 2024-11-22 DIAGNOSIS — M99.07 SOMATIC DYSFUNCTION OF UPPER EXTREMITY: ICD-10-CM

## 2024-11-22 DIAGNOSIS — M99.02 SOMATIC DYSFUNCTION OF THORACIC REGION: ICD-10-CM

## 2024-11-22 PROCEDURE — 98927 OSTEOPATH MANJ 5-6 REGIONS: CPT | Performed by: FAMILY MEDICINE

## 2024-11-22 PROCEDURE — 99213 OFFICE O/P EST LOW 20 MIN: CPT | Performed by: FAMILY MEDICINE

## 2024-12-03 NOTE — PROGRESS NOTES
The Assessment & Plan     This is a 59 y.o. female who presents for OMT follow-up for:  1. Cervical radiculopathy  OMT      2. Somatic dysfunction of head region  OMT      3. Somatic dysfunction of cervical region  OMT      4. Somatic dysfunction of thoracic region  OMT      5. Somatic dysfunction of rib cage region  OMT      6. Somatic dysfunction of upper extremity  OMT           1. Patient tolerated OMT well for the above problems,  advised patient to drink fluids and can use NSAID for soreness after treatment     2. OMT Follow up in 4 weeks.    Subjective     Lennie Salinas is a 59 y.o. female and is here for a OMT follow up. The patient reports she did have some improvement in her neck tightness after her last visit.  She does continue with right greater than left leg pain.  She does continue to do home exercises we previously reviewed.  She has been trying to reduce her activity level to help rest and believes that is helping.    Is the patient taking Pain medication? no  Has the patient completed physical therapy for this condition? no  Did Patient symptoms improve from last OMT appointment? yes    The following portions of the patient's history were reviewed and updated as appropriate: allergies, current medications, past family history, past medical history, past social history, past surgical history, and problem list.    Review of Systems  Review of Systems   Constitutional:  Negative for chills and fever.   HENT:  Negative for ear pain and sore throat.    Eyes:  Negative for pain and visual disturbance.   Respiratory:  Negative for cough and shortness of breath.    Cardiovascular:  Negative for chest pain and palpitations.   Gastrointestinal:  Negative for abdominal pain and vomiting.   Genitourinary:  Negative for dysuria and hematuria.   Musculoskeletal:  Positive for neck pain. Negative for arthralgias and back pain.   Skin:  Negative for color change and rash.   Neurological:  Negative for seizures  and syncope.   Psychiatric/Behavioral:  Negative for confusion and sleep disturbance. The patient is not nervous/anxious.    All other systems reviewed and are negative.        Objective     OMT Exam     OMT    Performed by: See Douglas DO  Authorized by: See Douglas DO  Universal Protocol:  procedure performed by consultantConsent given by: patient  Patient identity confirmed: verbally with patient      Procedure Details:     Region evaluated and treated:  Head, Cervical, Thoracic, Ribs and Right Extremities    Extremity Information  Extremities: right upper extremity    Thoracic Information  Thoracic Region: T1 - T4  Head Details:     Examination Method:  Tissue Texture Change, Stability, Laxity, Effusions, Tone, Range of Motion, Contracture, Asymmetry, Misalignment, Crepitation, Defects, Masses and Tenderness, Pain    Severity:  Moderate    Osteopathic Findings:  - OA FSRRL  - R inion tender point     Treatment Method:  Counterstrain Treatment, Muscle Energy Treatment, Myofascial Release Treatment and Soft Tissue Treatment    Response:  Improved - The somatic dysfunction is improved but not completely resolved.    Cervical Details:     Examination Method:  Tissue Texture Change, Stability, Laxity, Effusions, Tone, Range of Motion, Contracture, Asymmetry, Misalignment, Crepitation, Defects, Masses and Tenderness, Pain    Severity:  Moderate    Osteopathic Findings:  - Bilateral paracervical muscle hypertonicity, right greater than left  -Bilateral scalene muscle hypertonicity, right greater than left  -Right SCM hypertonicity, C7 tender point noted    Treatment Method:  Counterstrain Treatment, Muscle Energy Treatment, Myofascial Release Treatment and Soft Tissue Treatment    Response:  Improved - The somatic dysfunction is improved but not completely resolved.    Thoracic T1 - T4 details:     Examination Method:  Tissue Texture Change, Stability, Laxity, Effusions, Tone, Range of Motion, Contracture,  Asymmetry, Misalignment, Crepitation, Defects, Masses and Tenderness, Pain    Severity:  Moderate    Osteopathic Findings:  - Bilateral paraspinal muscle hypertonicity, right greater than left  -Bilateral trapezius muscle hypertonicity, right greater than left    Treatment Method:  Muscle Energy Treatment, Soft Tissue Treatment and Myofascial Release Treatment    Response:  Improved    Right Upper Extremity details:     Examination Method:  Tissue Texture Change, Stability, Laxity, Effusions, Tone, Range of Motion, Contracture, Asymmetry, Misalignment, Crepitation, Defects, Masses and Tenderness, Pain    Severity:  Moderate    Osteopathic Findings:  - Right deltoid hypertonicity and tender point noted    Treatment Method:  Articulatory Treatment, Counterstrain Treatment and Muscle Energy Treatment    Response:  Improved - The somatic dysfunction is improved but not completely resolved.    Ribs details:     Examination Method:  Tissue Texture Change, Stability, Laxity, Effusions, Tone, Range of Motion, Contracture, Asymmetry, Misalignment, Crepitation, Defects, Masses and Tenderness, Pain    Severity:  Moderate    Osteopathic Findings:  Bilateral first rib elevations and tender points noted    Treatment Method:  Articulatory Treatment, Muscle Energy Treatment and Soft Tissue Treatment    Response:  Improved - The somatic dysfunction is improved but not completely resolved.    Total Regions Treated:  5

## 2024-12-17 ENCOUNTER — OFFICE VISIT (OUTPATIENT)
Dept: FAMILY MEDICINE CLINIC | Facility: CLINIC | Age: 59
End: 2024-12-17
Payer: COMMERCIAL

## 2024-12-17 DIAGNOSIS — M54.12 CERVICAL RADICULOPATHY: Primary | ICD-10-CM

## 2024-12-17 DIAGNOSIS — M99.07 SOMATIC DYSFUNCTION OF UPPER EXTREMITY: ICD-10-CM

## 2024-12-17 DIAGNOSIS — M99.01 SOMATIC DYSFUNCTION OF CERVICAL REGION: ICD-10-CM

## 2024-12-17 DIAGNOSIS — M99.08 SOMATIC DYSFUNCTION OF RIB CAGE REGION: ICD-10-CM

## 2024-12-17 DIAGNOSIS — M99.00 SOMATIC DYSFUNCTION OF HEAD REGION: ICD-10-CM

## 2024-12-17 DIAGNOSIS — M99.02 SOMATIC DYSFUNCTION OF THORACIC REGION: ICD-10-CM

## 2024-12-17 PROCEDURE — 98927 OSTEOPATH MANJ 5-6 REGIONS: CPT | Performed by: FAMILY MEDICINE

## 2024-12-17 PROCEDURE — 99213 OFFICE O/P EST LOW 20 MIN: CPT | Performed by: FAMILY MEDICINE

## 2024-12-17 NOTE — PROGRESS NOTES
The Assessment & Plan     This is a 59 y.o. female who presents for OMT follow-up for:  1. Cervical radiculopathy  OMT      2. Somatic dysfunction of head region  OMT      3. Somatic dysfunction of cervical region  OMT      4. Somatic dysfunction of thoracic region  OMT      5. Somatic dysfunction of rib cage region  OMT      6. Somatic dysfunction of upper extremity  OMT           1. Patient tolerated OMT well for the above problems,  advised patient to drink fluids and can use NSAID for soreness after treatment     2. OMT Follow up in 4 weeks.    Subjective     Lennie Salinas is a 59 y.o. female and is here for a OMT follow up. The patient reports neck pain has been much improved.  She has been doing some of the home exercises that we previously reviewed does feel like it is helping.  She does note that OMT did help significantly last time.  She is having more mobility.  Does note that with stressors tightness does come back however overall is improved.  She denies any weakness in her upper extremities.  No numbness or tingling.  No red flag symptoms of her neck pain.    Is the patient taking Pain medication? no  Has the patient completed physical therapy for this condition? no  Did Patient symptoms improve from last OMT appointment? yes    The following portions of the patient's history were reviewed and updated as appropriate: allergies, current medications, past family history, past medical history, past social history, past surgical history, and problem list.    Review of Systems  Review of Systems   Constitutional:  Negative for chills and fever.   HENT:  Negative for ear pain and sore throat.    Eyes:  Negative for pain and visual disturbance.   Respiratory:  Negative for cough and shortness of breath.    Cardiovascular:  Negative for chest pain and palpitations.   Gastrointestinal:  Negative for abdominal pain and vomiting.   Endocrine: Negative for polydipsia and polyuria.   Genitourinary:  Negative for  dysuria and hematuria.   Musculoskeletal:  Positive for neck pain. Negative for arthralgias and back pain.   Skin:  Negative for color change and rash.   Neurological:  Negative for seizures and syncope.   Psychiatric/Behavioral:  Negative for confusion. The patient is not nervous/anxious.    All other systems reviewed and are negative.        Objective     OMT Exam     OMT    Performed by: See Douglas DO  Authorized by: See Douglas DO  Universal Protocol:  procedure performed by consultantConsent: Verbal consent obtained.  Consent given by: patient  Patient identity confirmed: verbally with patient      Procedure Details:     Region evaluated and treated:  Head, Cervical, Thoracic, Ribs and Right Extremities    Extremity Information  Extremities: right upper extremity    Thoracic Information  Thoracic Region: T1 - T4  Head Details:     Examination Method:  Tissue Texture Change, Stability, Laxity, Effusions, Tone, Range of Motion, Contracture, Asymmetry, Misalignment, Crepitation, Defects, Masses and Tenderness, Pain    Severity:  Moderate    Osteopathic Findings:  - OA FSRRL  - R inion tender point    Treatment Method:  Counterstrain Treatment, Muscle Energy Treatment and Soft Tissue Treatment    Response:  Improved - The somatic dysfunction is improved but not completely resolved.    Cervical Details:     Examination Method:  Tissue Texture Change, Stability, Laxity, Effusions, Tone, Range of Motion, Contracture, Asymmetry, Misalignment, Crepitation, Defects, Masses and Tenderness, Pain    Severity:  Moderate    Osteopathic Findings:  - Bilateral paracervical muscle hypertonicity  -Bilateral scalene muscle hypertonicity, right greater than left  -Right: C3, C5, C7 tender points noted    Treatment Method:  Counterstrain Treatment, Muscle Energy Treatment, Myofascial Release Treatment and Soft Tissue Treatment    Response:  Improved - The somatic dysfunction is improved but not completely  resolved.    Thoracic T1 - T4 details:     Examination Method:  Tissue Texture Change, Stability, Laxity, Effusions, Tone, Range of Motion, Contracture, Asymmetry, Misalignment, Crepitation, Defects, Masses and Tenderness, Pain    Severity:  Moderate    Osteopathic Findings:  - Bilateral paraspinal muscle hypertonicity, right greater than left  -Bilateral trapezius muscle hypertonicity, right greater than left  -T4 flexed side right rotated right with tender point in the right    Treatment Method:  Counterstrain Treatment, Muscle Energy Treatment, Myofascial Release Treatment and Soft Tissue Treatment    Response:  Improved    Right Upper Extremity details:     Examination Method:  Tissue Texture Change, Stability, Laxity, Effusions, Tone, Range of Motion, Contracture, Asymmetry, Misalignment, Crepitation, Defects, Masses and Tenderness, Pain    Severity:  Moderate    Osteopathic Findings:  - Deltoid hypertonicity and tender point noted on the right    Treatment Method:  Articulatory Treatment, Muscle Energy Treatment and Soft Tissue Treatment    Response:  Improved - The somatic dysfunction is improved but not completely resolved.    Ribs details:     Examination Method:  Tissue Texture Change, Stability, Laxity, Effusions, Tone, Range of Motion, Contracture, Asymmetry, Misalignment, Crepitation, Defects, Masses and Tenderness, Pain    Severity:  Moderate    Osteopathic Findings:  - First rib elevated on the right with tender point noted    Treatment Method:  Articulatory Treatment and Muscle Energy Treatment    Response:  Improved - The somatic dysfunction is improved but not completely resolved.    Total Regions Treated:  5

## 2024-12-21 ENCOUNTER — HOSPITAL ENCOUNTER (OUTPATIENT)
Dept: RADIOLOGY | Facility: HOSPITAL | Age: 59
Discharge: HOME | End: 2024-12-21
Attending: FAMILY MEDICINE
Payer: COMMERCIAL

## 2024-12-21 ENCOUNTER — HOSPITAL ENCOUNTER (OUTPATIENT)
Dept: RADIOLOGY | Facility: HOSPITAL | Age: 59
Discharge: HOME | End: 2024-12-21
Payer: COMMERCIAL

## 2024-12-21 DIAGNOSIS — R59.0 CERVICAL LYMPHADENOPATHY: ICD-10-CM

## 2024-12-21 DIAGNOSIS — K11.8 PAROTID MASS: ICD-10-CM

## 2024-12-21 PROCEDURE — 70553 MRI BRAIN STEM W/O & W/DYE: CPT

## 2024-12-21 PROCEDURE — A9585 GADOBUTROL INJECTION: HCPCS | Performed by: FAMILY MEDICINE

## 2024-12-21 PROCEDURE — 70543 MRI ORBT/FAC/NCK W/O &W/DYE: CPT

## 2024-12-21 RX ORDER — GADOBUTROL 604.72 MG/ML
5 INJECTION INTRAVENOUS
Status: COMPLETED | OUTPATIENT
Start: 2024-12-21 | End: 2024-12-21

## 2024-12-21 RX ORDER — GADOBUTROL 604.72 MG/ML
5 INJECTION INTRAVENOUS
Status: DISCONTINUED | OUTPATIENT
Start: 2024-12-21 | End: 2024-12-22 | Stop reason: HOSPADM

## 2024-12-21 RX ADMIN — GADOBUTROL 5 ML: 604.72 INJECTION INTRAVENOUS at 13:31

## 2024-12-24 ENCOUNTER — RA CDI HCC (OUTPATIENT)
Dept: OTHER | Facility: HOSPITAL | Age: 59
End: 2024-12-24

## 2024-12-24 NOTE — PROGRESS NOTES
HCC coding opportunities       Chart reviewed, no opportunity found: CHART REVIEWED, NO OPPORTUNITY FOUND        Patients Insurance        Commercial Insurance: [employer]

## 2025-01-09 NOTE — TELEPHONE ENCOUNTER
Patients GI provider:  Dr. Sanchez    Number to return call: [phone]    Reason for call: Pt calling stating she has an appt. In August but she had some loose stool and a little blood. After some discussion she decided to wait and see if this continues since she has an appt. If it continues or worsens she will call back.    Scheduled procedure/appointment date if applicable: 8/14/24     
Include Location In Plan?: No
Detail Level: Zone

## 2025-01-14 ENCOUNTER — OFFICE VISIT (OUTPATIENT)
Dept: FAMILY MEDICINE CLINIC | Facility: CLINIC | Age: 60
End: 2025-01-14

## 2025-01-14 DIAGNOSIS — M99.08 SOMATIC DYSFUNCTION OF RIB CAGE REGION: ICD-10-CM

## 2025-01-14 DIAGNOSIS — K11.8 PAROTID MASS: ICD-10-CM

## 2025-01-14 DIAGNOSIS — M99.00 SOMATIC DYSFUNCTION OF HEAD REGION: ICD-10-CM

## 2025-01-14 DIAGNOSIS — M99.02 SOMATIC DYSFUNCTION OF THORACIC REGION: ICD-10-CM

## 2025-01-14 DIAGNOSIS — M99.01 SOMATIC DYSFUNCTION OF CERVICAL REGION: ICD-10-CM

## 2025-01-14 DIAGNOSIS — M54.12 CERVICAL RADICULOPATHY: Primary | ICD-10-CM

## 2025-01-14 DIAGNOSIS — M99.07 SOMATIC DYSFUNCTION OF UPPER EXTREMITY: ICD-10-CM

## 2025-01-24 NOTE — PROGRESS NOTES
The Assessment & Plan     This is a 59 y.o. female who presents for OMT follow-up for:  1. Cervical radiculopathy  OMT      2. Parotid mass  Ambulatory Referral to Otolaryngology      3. Somatic dysfunction of head region  OMT      4. Somatic dysfunction of cervical region  OMT      5. Somatic dysfunction of thoracic region  OMT      6. Somatic dysfunction of rib cage region  OMT      7. Somatic dysfunction of upper extremity  OMT           1. Patient tolerated OMT well for the above problems,  advised patient to drink fluids and can use NSAID for soreness after treatment     2. OMT Follow up in 4 weeks.    Subjective     Lennie Salinas is a 59 y.o. female and is here for a OMT follow up. The patient reports she has been doing better since her last OMT visit.  Notes she did get some neck tightness and radiation into her right arm recently however has been pretty well.  She has been doing home exercises and stretches as we had reviewed previously reviewed.  Denies any new trauma or injury to the area.  She denies any weakness in her hands.  Denies any burning sensations.  She is able to sleep without any concern.  We did discuss her recent MRI imaging shows parotid mass on the right side which measures about 1 x 0.8 cm.  She does have question as was compared to a MRI done on 5/9/2016.  She had the reports for that MRI and notes that reports did not note any mass there wanted to make sure was not being compared to other cystic structure that was found on previous imaging.  We will reach out for clarification.    Is the patient taking Pain medication? no  Has the patient completed physical therapy for this condition? no  Did Patient symptoms improve from last OMT appointment? yes    The following portions of the patient's history were reviewed and updated as appropriate: allergies, current medications, past family history, past medical history, past social history, past surgical history, and problem list.    Review  of Systems  Review of Systems   Constitutional:  Negative for chills and fever.   HENT:  Negative for ear pain and sore throat.    Eyes:  Negative for pain and visual disturbance.   Respiratory:  Negative for cough and shortness of breath.    Cardiovascular:  Negative for chest pain and palpitations.   Gastrointestinal:  Negative for abdominal pain and vomiting.   Genitourinary:  Negative for dysuria and hematuria.   Musculoskeletal:  Positive for neck pain. Negative for arthralgias and back pain.   Skin:  Negative for color change and rash.   Neurological:  Negative for dizziness, seizures and syncope.   Psychiatric/Behavioral:  Negative for confusion and sleep disturbance. The patient is not nervous/anxious.    All other systems reviewed and are negative.        Objective     OMT Exam     OMT    Performed by: See Douglas DO  Authorized by: See Douglas DO  Universal Protocol:  procedure performed by consultantConsent: Verbal consent obtained.  Consent given by: patient  Patient identity confirmed: verbally with patient      Procedure Details:     Region evaluated and treated:  Head, Cervical, Thoracic, Ribs and Right Extremities    Extremity Information  Extremities: right upper extremity    Thoracic Information  Thoracic Region: T1 - T4  Head Details:     Examination Method:  Tissue Texture Change, Stability, Laxity, Effusions, Tone, Range of Motion, Contracture, Asymmetry, Misalignment, Crepitation, Defects, Masses and Tenderness, Pain    Severity:  Moderate    Osteopathic Findings:  - OA FSRRL  - Bilateral inion tender points    Treatment Method:  Articulatory Treatment, Muscle Energy Treatment, Myofascial Release Treatment and Soft Tissue Treatment    Response:  Improved - The somatic dysfunction is improved but not completely resolved.    Cervical Details:     Examination Method:  Tissue Texture Change, Stability, Laxity, Effusions, Tone, Range of Motion, Contracture, Asymmetry, Misalignment,  Crepitation, Defects, Masses and Tenderness, Pain    Severity:  Moderate    Osteopathic Findings:  - Bilateral paracervical muscle hypertonicity, right greater than left  -Bilateral scalene muscle hypertonicity  -Bilateral SCM hypertonicity, right greater than left  -C5 flexed side bent right rotated right with tender point on the right    Treatment Method:  Articulatory Treatment, Counterstrain Treatment, Muscle Energy Treatment, Myofascial Release Treatment and Soft Tissue Treatment    Response:  Improved - The somatic dysfunction is improved but not completely resolved.    Thoracic T1 - T4 details:     Examination Method:  Tissue Texture Change, Stability, Laxity, Effusions, Tone, Range of Motion, Contracture, Asymmetry, Misalignment, Crepitation, Defects, Masses and Tenderness, Pain    Severity:  Moderate    Osteopathic Findings:  - Bilateral paraspinal muscle hypertonicity, right greater than left  -Bilateral trapezius muscle hypertonicity, right greater than left  -T4 flexed side bent right rotated right    Treatment Method:  Counterstrain Treatment, Muscle Energy Treatment and Soft Tissue Treatment    Response:  Improved    Right Upper Extremity details:     Examination Method:  Tissue Texture Change, Stability, Laxity, Effusions, Tone, Range of Motion, Contracture, Asymmetry, Misalignment, Crepitation, Defects, Masses and Tenderness, Pain    Severity:  Moderate    Osteopathic Findings:  - Deltoid hypertonicity noted on the right, tender point noted    Treatment Method:  Articulatory Treatment, Counterstrain Treatment and Soft Tissue Treatment    Response:  Improved - The somatic dysfunction is improved but not completely resolved.    Ribs details:     Examination Method:  Tissue Texture Change, Stability, Laxity, Effusions, Tone, Range of Motion, Contracture, Asymmetry, Misalignment, Crepitation, Defects, Masses and Tenderness, Pain    Severity:  Moderate    Osteopathic Findings:  First rib elevation noted  on the right with tender point noted    Treatment Method:  Articulatory Treatment and Soft Tissue Treatment    Response:  Improved - The somatic dysfunction is improved but not completely resolved.    Total Regions Treated:  5

## 2025-02-11 ENCOUNTER — PROCEDURE VISIT (OUTPATIENT)
Dept: FAMILY MEDICINE CLINIC | Facility: CLINIC | Age: 60
End: 2025-02-11
Payer: COMMERCIAL

## 2025-02-11 DIAGNOSIS — M99.08 SOMATIC DYSFUNCTION OF RIB CAGE REGION: ICD-10-CM

## 2025-02-11 DIAGNOSIS — M99.07 SOMATIC DYSFUNCTION OF UPPER EXTREMITY: ICD-10-CM

## 2025-02-11 DIAGNOSIS — M99.02 SOMATIC DYSFUNCTION OF THORACIC REGION: ICD-10-CM

## 2025-02-11 DIAGNOSIS — M99.00 SOMATIC DYSFUNCTION OF HEAD REGION: ICD-10-CM

## 2025-02-11 DIAGNOSIS — M99.01 SOMATIC DYSFUNCTION OF CERVICAL REGION: ICD-10-CM

## 2025-02-11 DIAGNOSIS — M54.12 CERVICAL RADICULOPATHY: Primary | ICD-10-CM

## 2025-02-11 PROCEDURE — 98927 OSTEOPATH MANJ 5-6 REGIONS: CPT | Performed by: FAMILY MEDICINE

## 2025-02-20 ENCOUNTER — OFFICE VISIT (OUTPATIENT)
Age: 60
End: 2025-02-20
Payer: COMMERCIAL

## 2025-02-20 VITALS
BODY MASS INDEX: 18.77 KG/M2 | HEART RATE: 86 BPM | WEIGHT: 102 LBS | OXYGEN SATURATION: 100 % | DIASTOLIC BLOOD PRESSURE: 88 MMHG | SYSTOLIC BLOOD PRESSURE: 132 MMHG | HEIGHT: 62 IN

## 2025-02-20 DIAGNOSIS — G62.9 SMALL FIBER NEUROPATHY: ICD-10-CM

## 2025-02-20 DIAGNOSIS — M35.00 SJOGREN'S SYNDROME WITHOUT EXTRAGLANDULAR INVOLVEMENT (HCC): ICD-10-CM

## 2025-02-20 DIAGNOSIS — R76.8 POSITIVE ANA (ANTINUCLEAR ANTIBODY): ICD-10-CM

## 2025-02-20 DIAGNOSIS — M79.7 FIBROMYALGIA: ICD-10-CM

## 2025-02-20 DIAGNOSIS — K11.8 MASS OF RIGHT PAROTID GLAND: ICD-10-CM

## 2025-02-20 DIAGNOSIS — M15.0 PRIMARY GENERALIZED (OSTEO)ARTHRITIS: ICD-10-CM

## 2025-02-20 DIAGNOSIS — K52.832 LYMPHOCYTIC COLITIS: ICD-10-CM

## 2025-02-20 DIAGNOSIS — M54.81 OCCIPITAL NEURALGIA OF RIGHT SIDE: ICD-10-CM

## 2025-02-20 DIAGNOSIS — G56.21 CUBITAL TUNNEL SYNDROME ON RIGHT: ICD-10-CM

## 2025-02-20 DIAGNOSIS — K58.2 IRRITABLE BOWEL SYNDROME WITH BOTH CONSTIPATION AND DIARRHEA: ICD-10-CM

## 2025-02-20 DIAGNOSIS — M35.9 UNDIFFERENTIATED CONNECTIVE TISSUE DISEASE (HCC): Primary | ICD-10-CM

## 2025-02-20 DIAGNOSIS — M54.2 CERVICALGIA: ICD-10-CM

## 2025-02-20 DIAGNOSIS — G89.29 CHRONIC RIGHT SHOULDER PAIN: ICD-10-CM

## 2025-02-20 DIAGNOSIS — M25.511 CHRONIC RIGHT SHOULDER PAIN: ICD-10-CM

## 2025-02-20 PROCEDURE — 99214 OFFICE O/P EST MOD 30 MIN: CPT | Performed by: INTERNAL MEDICINE

## 2025-02-20 NOTE — PROGRESS NOTES
Assessment/Plan:    Undifferentiated connective tissue disease (HCC)  Patient with history of positive MARYELLEN. Lupus AVISE from 10/19/2023 with negative index suggesting low likelihood for lupus. Her MARYELLEN by IgG and HEP-2 antibody are positive, however, all components of the MARYELLEN are negative. We will for updated serologies as well as repeat lupus Avise CTD testing in view of her history of Raynaud's, sicca symptoms, and parotid mass.  I am unable to palpate a discrete parotid mass at the exam at this visit, however, patient needs further investigation to rule out parotitis versus parotid gland benign tumor.  Additionally patient was referred to Dr. Juvenal Peguero, ENT, regarding the parotid gland mass and potential need for additional workup.  I will be in touch with the patient to review results of lab work when available.  Follow-up 6 months or sooner if needed.    Sjogren's syndrome without extraglandular involvement (HCC)  Patient with history of positive MARYELLEN, with a history of sicca symptoms, Raynaud's, and more recently diagnosed right parotid gland mass.  Sjogren's antibodies were negative in the past, however she was noted to have positive carbonic anhydrase VI, with positive IgM and IgA and negative IgG.  Salivary protein antibody and parotid specific antibodies were negative.  She was diagnosed with lymphocytic colitis on colonoscopy from the fall 2023 and was given a prescription for budesonide which she did not take due to concerns for side effects.  She does use eyedrops for dry eyes.  Both ultrasound of the neck and MRI of the soft tissues of the neck were significant for right parotid mass measuring approximately 1 cm x 0.8 cm.  There is a question as to whether this was seen on a prior study from 2016, which was commented on the most recent imaging, however, when I reviewed the imaging from 2016, there is no documentation of a mass seen.  I have referred the patient to Dr. Peguero, ENT, for further  evaluation of the parotid mass.  She may need a sialogram or biopsy.  Await recommendations from Dr. Pegureo.  Continue symptomatic treatment for Sjogren's symptoms.  I did encourage the patient to be consistent with the anti-inflammatory diet which may be of benefit for her symptoms.  Continue to monitor.    Fibromyalgia  Fibromyalgia with associated triggers, and sleep disturbance with fatigue. Symptoms had improved significantly with following a gluten-free and dairy free diet, however, she has not been as consistent with the diet as she had in the past. She does note that certain foods do precipitate GI symptoms or generalized malaise. I have encouraged her to be consistent with the anti-inflammatory diet.  She was referred for [place] [facility] had success in the past.  I asked the therapist to work on her cervicalgia as well as right shoulder girdle and upper extremity symptoms.  She may have a component of cubital tunnel syndrome as well.  Follow-up 6 months or sooner if needed.  Continue to monitor.    Cervicalgia  Cervicalgia with multiple triggers posterior cervical and shoulder girdle muscles right greater than left, with positive Tinel's right medial elbow consistent with cubital tunnel syndrome.  She does have tenderness in the right occipital groove as well.  [place] [facility] benefit in the past.  She is not eager for prescription medication, however, if symptoms persist, may need to reconsider.  Plan follow-up in 6 months or sooner if needed.  Continue to monitor.    Mass of right parotid gland  Right parotid mass noted on ultrasound of the neck from October 2024 and MRI of the soft tissue of the neck from November 2024.  She does have dry mouth when eating sugar and has had complaints of nasal and mouth sores in addition to dysphonia.  I am unable to palpate a discrete parotid mass on exam at this visit, however, she  was referred to Dr. Peguero, ENT specialist to further evaluate and manage.  Rule out parotitis versus benign parotid gland tumor.  Continue to monitor.    Primary generalized (osteo)arthritis  Interphalangeal osteoarthritis, hands and feet with early Heberden's nodes.  Encouraged trial of paraffin wax bath for symptomatic relief.  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 No evidence clinically or with lab work for inflammatory arthritis.  Questionable Sjogren's syndrome primary versus secondary.  History Raynaud's without digital ulcerations.  Patient was referred back to physical therapy.  Encouraged home exercise program as tolerated.    Occipital neuralgia of right side  Cervicalgia with multiple triggers posterior cervical and shoulder muscles as well as tenderness right occipital groove.  Patient referred for physical therapy.  She does have evidence of mild multilevel degenerative changes on MRI of the soft tissue of the neck from November 2024.  Continue to monitor.    Small fiber neuropathy  Patient has prior history of vague paresthesias and numbness right upper extremity with EMG right upper extremity unremarkable.  Currently she has right shoulder pain radiating into the upper extremity with positive Tinel's right elbow.  Rule out cubital tunnel syndrome.  Rule out small fiber neuropathy related to fibromyalgia.  Patient has been referred for physical therapy.  Could consider medication if symptoms warrant.  Continue to monitor.    Irritable bowel syndrome with both constipation and diarrhea  IBS with alternating diarrhea and constipation and history of GERD.  She did have success when she was more strictly following gluten-free and dairy free diet.  Have encouraged her to be consistent with the anti-inflammatory diet for potential benefit.  She does have a book reference laboratory diet.  She does follow with GI.  Continue to monitor.    Lymphocytic colitis  History of lymphocytic colitis noted  on colonoscopy from fall 2023.  Patient was not willing to take budesonide due to potential for side effects, however, her GI symptoms had improved with food elimination diet.  At present she does have IBS symptoms with alternating diarrhea and constipation as she has not been with free and dairy free diet.  I did encourage her to do the full anti-inflammatory diet.  She does have a book reference for this.  Continue to monitor.  Follow-up GI as scheduled.         Problem List Items Addressed This Visit     Cervicalgia    Cervicalgia with multiple triggers posterior cervical and shoulder girdle muscles right greater than left, with positive Tinel's right medial elbow consistent with cubital tunnel syndrome.  She does have tenderness in the right occipital groove as well.  Patient referred to physical therapy at [facility] where she has had benefit in the past.  She is not eager for prescription medication, however, if symptoms persist, may need to reconsider.  Plan follow-up in 6 months or sooner if needed.  Continue to monitor.         Relevant Orders    Ambulatory Referral to Physical Therapy    Small fiber neuropathy    Patient has prior history of vague paresthesias and numbness right upper extremity with EMG right upper extremity unremarkable.  Currently she has right shoulder pain radiating into the upper extremity with positive Tinel's right elbow.  Rule out cubital tunnel syndrome.  Rule out small fiber neuropathy related to fibromyalgia.  Patient has been referred for physical therapy.  Could consider medication if symptoms warrant.  Continue to monitor.         Positive MARYELLEN (antinuclear antibody)    Lymphocytic colitis    History of lymphocytic colitis noted on colonoscopy from fall 2023.  Patient was not willing to take budesonide due to potential for side effects, however, her GI symptoms had improved with food elimination diet.  At present she does have IBS symptoms with alternating diarrhea and constipation  as she has not been with free and dairy free diet.  I did encourage her to do the full anti-inflammatory diet.  She does have a book reference for this.  Continue to monitor.  Follow-up GI as scheduled.         Undifferentiated connective tissue disease (HCC) - Primary    Patient with history of positive MARYELLEN. Lupus AVISE from 10/19/2023 with negative index suggesting low likelihood for lupus. Her MARYELLEN by IgG and HEP-2 antibody are positive, however, all components of the MARYELLEN are negative. We will for updated serologies as well as repeat lupus Avise CTD testing in view of her history of Raynaud's, sicca symptoms, and parotid mass.  I am unable to palpate a discrete parotid mass at the exam at this visit, however, patient needs further investigation to rule out parotitis versus parotid gland benign tumor.  Additionally patient was referred to Dr. Juvenal Peguero, ENT, regarding the parotid gland mass and potential need for additional workup.  I will be in touch with the patient to review results of lab work when available.  Follow-up 6 months or sooner if needed.         Relevant Orders    MISCELLANEOUS LAB TEST    C-reactive protein    CBC and differential    Comprehensive metabolic panel    C3 complement    C4 complement    Urinalysis with microscopic    Protein electrophoresis, serum    Immunofixation IgA,IgG,IgM Qt.Immunofixation Serum Immunoglobulin    Ambulatory Referral to Otolaryngology    Irritable bowel syndrome with both constipation and diarrhea    IBS with alternating diarrhea and constipation and history of GERD.  She did have success when she was more strictly following gluten-free and dairy free diet.  Have encouraged her to be consistent with the anti-inflammatory diet for potential benefit.  She does have a book reference laboratory diet.  She does follow with GI.  Continue to monitor.         Primary generalized (osteo)arthritis    Interphalangeal osteoarthritis, hands and feet with early Heberden's  nodes.  Encouraged trial of paraffin wax bath for symptomatic relief.  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 No evidence clinically or with lab work for inflammatory arthritis.  Questionable Sjogren's syndrome primary versus secondary.  History Raynaud's without digital ulcerations.  Patient was referred back to physical therapy.  Encouraged home exercise program as tolerated.         Fibromyalgia    Fibromyalgia with associated triggers, and sleep disturbance with fatigue. Symptoms had improved significantly with following a gluten-free and dairy free diet, however, she has not been as consistent with the diet as she had in the past. She does note that certain foods do precipitate GI symptoms or generalized malaise. I have encouraged her to be consistent with the anti-inflammatory diet.  She was referred for physical therapy back to [facility] where she had success in the past.  I asked the therapist to work on her cervicalgia as well as right shoulder girdle and upper extremity symptoms.  She may have a component of cubital tunnel syndrome as well.  Follow-up 6 months or sooner if needed.  Continue to monitor.         Relevant Orders    Ambulatory Referral to Physical Therapy    Chronic right shoulder pain    Relevant Orders    Ambulatory Referral to Physical Therapy    Occipital neuralgia of right side    Cervicalgia with multiple triggers posterior cervical and shoulder muscles as well as tenderness right occipital groove.  Patient referred for physical therapy.  She does have evidence of mild multilevel degenerative changes on MRI of the soft tissue of the neck from November 2024.  Continue to monitor.         Relevant Orders    Ambulatory Referral to Physical Therapy    Sjogren's syndrome without extraglandular involvement (HCC)    Patient with history of positive MARYELLEN, with a history of sicca symptoms, Raynaud's, and more recently diagnosed right parotid gland mass.  Sjogren's antibodies  were negative in the past, however she was noted to have positive carbonic anhydrase VI, with positive IgM and IgA and negative IgG.  Salivary protein antibody and parotid specific antibodies were negative.  She was diagnosed with lymphocytic colitis on colonoscopy from the fall 2023 and was given a prescription for budesonide which she did not take due to concerns for side effects.  She does use eyedrops for dry eyes.  Both ultrasound of the neck and MRI of the soft tissues of the neck were significant for right parotid mass measuring approximately 1 cm x 0.8 cm.  There is a question as to whether this was seen on a prior study from 2016, which was commented on the most recent imaging, however, when I reviewed the imaging from 2016, there is no documentation of a mass seen.  I have referred the patient to Dr. Peguero, ENT, for further evaluation of the parotid mass.  She may need a sialogram or biopsy.  Await recommendations from Dr. Peguero.  Continue symptomatic treatment for Sjogren's symptoms.  I did encourage the patient to be consistent with the anti-inflammatory diet which may be of benefit for her symptoms.  Continue to monitor.         Relevant Orders    MISCELLANEOUS LAB TEST    C-reactive protein    CBC and differential    Comprehensive metabolic panel    C3 complement    C4 complement    Urinalysis with microscopic    Protein electrophoresis, serum    Immunofixation IgA,IgG,IgM Qt.Immunofixation Serum Immunoglobulin    Mass of right parotid gland    Right parotid mass noted on ultrasound of the neck from October 2024 and MRI of the soft tissue of the neck from November 2024.  She does have dry mouth when eating sugar and has had complaints of nasal and mouth sores in addition to dysphonia.  I am unable to palpate a discrete parotid mass on exam at this visit, however, she was referred to Dr. Peguero, ENT specialist to further evaluate and manage.  Rule out parotitis versus benign parotid gland tumor.   Continue to monitor.         Relevant Orders    Ambulatory Referral to Otolaryngology   Other Visit Diagnoses       Cubital tunnel syndrome on right        Relevant Orders    Ambulatory Referral to Physical Therapy                Reviewed records, labs, and imaging with the patient in detail.  Counseled patient.  Discussion regarding my findings and recommendations.  Office visit with documentation 35 min.    Subjective:      Patient ID: Lennie Salinas is a 59 y.o. female.    59-year-old female who presents for a follow up visit, with complicated medical history with positive MARYELLEN with no typical stigmata for connective tissue disease with the exception of sicca symptoms and Raynaud's with no digital ulcerations. Her Sjogren's antibodies were negative, however, she was noted to have positive carbonic anhydrase VI, positive IgM and IgA with negative IgG. Salivary protein antibody and parotid specific antibody negative. She does have history of weight loss related to lymphocytic colitis noted on colonoscopy from fall 2023. She had been given a prescription for budesonide, which she did not take due to concerns of side effects. She has had migratory polyarthralgia's and myalgias. She was noted on her initial consultation to have multiple triggers consistent with fibromyalgia and chronic complex pain syndrome. She does have paresthesia's and numbness with EMG right upper extremity unremarkable. I suspect that she does have small fiber neuropathy questionably related to fibromyalgia. She has sleep disturbance with chronic fatigue and irritable bowel syndrome with alternating diarrhea and constipation. History of reflux. History of vasovagal episodes. Primary generalized osteoarthritis with history of left knee medial meniscus tear treated with physical therapy. History of urinary symptoms. Rule out interstitial cystitis.    Patient was last seen in March 2024.  She still has heat and cold intolerance. She has food  "allergies and environmental allergies.  Vertigo resolved. She has been using Soothe eyedrops for dry eyes.  Before Halloween she developed swelling in the right parotid gland.  She had an ultrasound of the neck dated 10/23/2024 which was significant for right parotid gland mass.  Subsequent MRI soft tissue neck dated 11/19/2024 significant for right parotid mass measuring 1 cm x 0.8 cm.  The radiologist compared it to an MRI from 2016 at which time he noted a parotid mass, however, when I reviewed the MRI report from 2016, no mass was mentioned.  There was also mild multilevel degenerative changes in the cervical spine.  MRI of the brain and orbits dated 12/21/2024 was unremarkable.  She has complained that her glands hurt and she has dysphonia.  She continues to have neck pain despite OMT by primary care, and she has had difficulty even turning her neck.  This has improved.  She notes she notes migratory polyarthralgias with no joint swelling. She still gets atypical chest pain, but thinks it is more due to the cysts in her breast. She has been eating minimum gluten.  Consumption of too much of dairy causes her stomach upset the next day. She had followed a strict diet for a long time, but has not been quite as compliant more recently. She eats gluten-free bread, turkey, and lettuce. She gets nose sores or mouth sores monthly, which resolved quickly.  She has new ones which look like blisters. She notices dry mouth if she consumes a lot of sugar. Her gum recession is stable. She denies cavities or dental decay. She gets hot flashes intermittently. She uses drops for her dry eyes. Her sleep is interrupted and she does not awaken feeling refreshed. Her hair thinning is better. She experiences pain in her joints, when she wakes up, or if she is lying on her side or hip, her elbow will be on \" fire\". She feels swelling in the top of her right foot. She had benefit over a year ago with physical therapy at [facility] where they specialize in myofascial syndromes.  Currently she complains of right shoulder arthralgias radiating into her upper extremity and elbow.  She relates this to shoveling snow and tucking her arm under while sleeping.    Lab work dated 8/15/2024 significant for CRP normal at 2.3.  CBC remarkable for decreased segmented neutrophils and increased lymphocytes.  Lupus AVISE dated 10/19/2023 significant for negative index of -1.4 with positive MARYELLEN by IgG as well as positive MARYELLEN by HEP-2 antibody in a dilution of 320 in a speckled pattern with all components negative. Sjogren's antibodies negative. Thyroid antibodies negative. Rheumatoid factor is negative. Phospholipid antibody profile negative. Anti-centromere antibodies, scleroderma antibodies, anti-Peyton-1 antibodies all negative. Low likelihood for lupus based on the lupus AVISE. Lupus anticoagulant negative. Review of lab work dated 08/12/2023 significant for nonreactive hepatitis C antibody, HIV 1/2 antigen/antibody, and thyroid function studies.          Allergies  Allergies   Allergen Reactions   • Sulfa Antibiotics Hives   • Kenalog [Triamcinolone Acetonide] Hives   • Neomycin Hives   • Penicillins      Reaction Date: 12Nov2009;    • Azithromycin Rash   • Levaquin [Levofloxacin] Tachycardia       Home Medications    Current Outpatient Medications:   •  Cholecalciferol (VITAMIN D-3 PO), Take by mouth in the morning, Disp: , Rfl:   •  D-MANNOSE PO, Take by mouth, Disp: , Rfl:   •  NON FORMULARY, Femdophilus, Disp: , Rfl:   •  NON FORMULARY, Take 1 tablet by mouth in the morning Natures balance fruit and vegies, Disp: , Rfl:   •  Probiotic Product (MetaSolv PO), Take by mouth, Disp: , Rfl:   •  vitamin B-12 (CYANOCOBALAMIN) 50 MCG tablet, Take 50 mcg by mouth daily, Disp: , Rfl:     Past Medical History  Past Medical History:   Diagnosis Date   • Allergic    • Costochondritis 05/31/2018   • Degenerative tear of left medial meniscus    •  Dystonia 04/28/2016   • GERD (gastroesophageal reflux disease)    • Hypertension     usually low but spikes   • Lactose intolerance    • Miscarriage 1999, 2000   • Urinary tract infection comes and goes blood in urine   • Visual impairment posterior vitreous detachment    2020       Past Surgical History   Past Surgical History:   Procedure Laterality Date   • BLADDER SURGERY     • COLONOSCOPY     • DILATION AND CURETTAGE OF UTERUS      Miscarriage x2   • HERNIA REPAIR      inguinal, left   • WI COLONOSCOPY FLX DX W/COLLJ SPEC WHEN PFRMD N/A 09/12/2018    Procedure: EGD AND COLONOSCOPY;  Surgeon: Nash Sanchez MD;  [facility];  Service: Gastroenterology   • TONSILLECTOMY     • UPPER GASTROINTESTINAL ENDOSCOPY         Family History   Family History   Problem Relation Age of Onset   • Hyperlipidemia Mother    • Hypertension Mother    • Liver disease Mother    • Pancreatic cancer Mother    • Mental illness Mother    • Cancer Mother         Pancreas & MEN-1   • Stroke Mother         mom had mini stroke   • Colon cancer Father 70   • Cancer Father         bladder cancer   • Heart disease Maternal Grandmother    • Dementia Maternal Grandmother    • Arthritis Maternal Grandmother    • Heart disease Maternal Grandfather    • Skin cancer Paternal Grandmother    • Breast cancer Paternal Grandmother         skin,bone & breast   • Kidney disease Paternal Grandfather    • Hypertension Brother    • Alcohol abuse Brother    • Mental illness Brother    • ADD / ADHD Brother    • OCD Brother    • Heart disease Maternal Uncle    • Diabetes Maternal Uncle    • Autoimmune disease Paternal Aunt         MS   • Autoimmune disease Paternal Aunt         Lupus   • No Known Problems Paternal Aunt    • No Known Problems Maternal Aunt    • Substance Abuse Neg Hx    • Depression Neg Hx        The following portions of the patient's history were reviewed and updated as appropriate: allergies, current medications, past family history,  "past medical history, past social history, past surgical history, and problem list.    Review of Systems   Constitutional:  Negative for chills and fever.        Episodes feeling flushed   HENT:  Positive for mouth sores, tinnitus and voice change. Negative for ear pain and sore throat.         Nasal sores/mouth sores comes and goes  Nasal sores painful, gone I-2 days  Dry mouth with eating sugar  Gum receding  Right parotid mass   Eyes:  Negative for pain and visual disturbance.        Dry eyes uses drops   Respiratory:  Negative for cough and shortness of breath.    Cardiovascular:  Negative for palpitations.   Gastrointestinal:  Negative for abdominal pain and vomiting.        Episodic diarrhea assoc with nausea/vomiting, sweating, tingling in arms resolved  Can have atypical chest wall pain rarely   Endocrine: Positive for cold intolerance and heat intolerance.   Genitourinary:  Negative for dysuria and hematuria.   Musculoskeletal:  Positive for arthralgias, myalgias and neck pain. Negative for back pain.   Skin:  Positive for rash. Negative for color change.        Hair thinning  Excessive hair loss 2021  Right temple hair loss with weight loss improved   Allergic/Immunologic: Positive for environmental allergies (mold and dust) and food allergies (garlic, onions, grapes, milk, pineapple,, sulfites, sulfates).   Neurological:  Positive for dizziness (vertigo resolved). Negative for seizures and syncope.   Hematological:  Bruises/bleeds easily (improved).   Psychiatric/Behavioral:  Positive for sleep disturbance (non refreshed).         Hx insomnia, sleep paralysis   All other systems reviewed and are negative.        Objective:      /88   Pulse 86   Ht 5' 2\" (1.575 m)   Wt 46.3 kg (102 lb)   LMP 02/27/2021 (Approximate)   SpO2 100%   BMI 18.66 kg/m²          Physical Exam  Vitals reviewed.   Constitutional:       Appearance: Normal appearance.   HENT:      Head: Normocephalic.      Nose:      " Comments: Nose and throat unremarkable.  Eyes:      Extraocular Movements: Extraocular movements intact.   Neck:      Comments: Without masses, thyromegaly, lymphadenopathy  Cardiovascular:      Rate and Rhythm: Regular rhythm.   Pulmonary:      Breath sounds: Normal breath sounds.   Abdominal:      Palpations: Abdomen is soft.   Musculoskeletal:      Cervical back: Neck supple.      Comments: Neck essentially full range of motion with triggers posterior cervical and shoulder muscles.  Some tenderness noted with palpation right occipital groove.  Right shoulder with painful abduction and external rotation with left shoulder with full range of motion.  Elbows full range of motion with triggers medial greater than lateral epicondyles.  Wrists full range of motion.  Tinel's negative at wrists bilaterally.  Tinel's positive right elbow.  Hands squaring first carpometacarpal joints bilaterally with early Heberden's nodes.  No synovitis appreciated.  Straight leg raising negative bilaterally.  Schober's negative.  No SI joint tenderness appreciated.  Hips full range of motion with triggers trochanteric bursae.  Knees full range of motion with patellofemoral crepitus with triggers pes anserine bursae.  Ankles full range of motion.  Feet rearfoot hyperpronation with midfoot cavus deformities, early hallux valgus deformities, early tailor's bunions left greater than right with no synovitis appreciated.  No tenderness plantar fascia insertion.     Skin:     General: Skin is warm.      Comments: Raynaud's noted fingers greater than toes.  No digital ulcerations.  No dilated nailfold capillary loops.   Neurological:      General: No focal deficit present.               This note was written in part using the assistance of the [employer] [id number] microphone system. Those portions using this system have been dictated and not read.

## 2025-02-20 NOTE — PATIENT INSTRUCTIONS
Get the blood work completed as ordered.  It does include the kit called lupus Avise in addition to other blood work.  Set up your physical therapy with [facility].  Set up your consultation with Dr. Peguero for the parotid mass.  I will be in touch with you as soon as the lab work comes back.  Give a call to let us know how you are doing after 4 weeks of therapy.  If you do not feel better I can give you medication which will help to block pain pathways, but I would like to wait until we start that.  I do think you should be gluten-free, dairy free, and sugar-free at this time.  The anti-inflammatory diet may be of benefit.

## 2025-02-21 ENCOUNTER — TELEPHONE (OUTPATIENT)
Age: 60
End: 2025-02-21

## 2025-02-26 ENCOUNTER — RA CDI HCC (OUTPATIENT)
Dept: OTHER | Facility: HOSPITAL | Age: 60
End: 2025-02-26

## 2025-02-26 NOTE — PROGRESS NOTES
The Assessment & Plan     This is a 59 y.o. female who presents for OMT follow-up for:  1. Cervical radiculopathy  OMT      2. Somatic dysfunction of head region  OMT      3. Somatic dysfunction of cervical region  OMT      4. Somatic dysfunction of thoracic region  OMT      5. Somatic dysfunction of rib cage region  OMT      6. Somatic dysfunction of upper extremity  OMT           1. Patient tolerated OMT well for the above problems,  advised patient to drink fluids and can use NSAID for soreness after treatment     2. OMT Follow up in 4 weeks.    Subjective     Lennie Salinas is a 59 y.o. female and is here for a OMT follow up. The patient reports she is doing well overall.  She continues to have neck pain and upper back tightness and discomfort.  She denies any new trauma or injury to the area.  Notes her neck tightness is most right side.  Does get occasional numbness down her right arm.  Denies any weaknes into her hand.  Denies headaches, visual changes, slurred speech.  No other red flag symptoms.  No other concerns.    Is the patient taking Pain medication? no  Has the patient completed physical therapy for this condition? no  Did Patient symptoms improve from last OMT appointment? yes    The following portions of the patient's history were reviewed and updated as appropriate: allergies, current medications, past family history, past medical history, past social history, past surgical history, and problem list.    Review of Systems  Review of Systems   Constitutional:  Negative for chills and fever.   HENT:  Negative for ear pain and sore throat.    Eyes:  Negative for pain and visual disturbance.   Respiratory:  Negative for cough and shortness of breath.    Cardiovascular:  Negative for chest pain and palpitations.   Gastrointestinal:  Negative for abdominal pain and vomiting.   Genitourinary:  Negative for dysuria and hematuria.   Musculoskeletal:  Positive for back pain and neck pain. Negative for  arthralgias.   Skin:  Negative for color change and rash.   Neurological:  Negative for seizures and syncope.   All other systems reviewed and are negative.        Objective     OMT Exam     OMT    Performed by: See Douglas DO  Authorized by: See Douglas DO  Universal Protocol:  procedure performed by consultantConsent: Verbal consent not obtained.  Consent given by: patient  Patient identity confirmed: verbally with patient      Procedure Details:     Region evaluated and treated:  Head, Cervical, Ribs, Thoracic and Right Extremities    Extremity Information  Extremities: right upper extremity    Thoracic Information  Thoracic Region: T1 - T4  Head Details:     Examination Method:  Tissue Texture Change, Stability, Laxity, Effusions, Tone, Range of Motion, Contracture, Asymmetry, Misalignment, Crepitation, Defects, Masses and Tenderness, Pain    Severity:  Moderate    Osteopathic Findings:  - OA FSRRL  - R inion tender point    Treatment Method:  Counterstrain Treatment, Muscle Energy Treatment, Soft Tissue Treatment and Myofascial Release Treatment    Response:  Improved - The somatic dysfunction is improved but not completely resolved.    Cervical Details:     Examination Method:  Tissue Texture Change, Stability, Laxity, Effusions, Tone, Range of Motion, Contracture, Asymmetry, Misalignment, Crepitation, Defects, Masses and Tenderness, Pain    Severity:  Moderate    Osteopathic Findings:  - Bilateral paracervical muscle hypertonicity, right greater than left  -Bilateral scalene muscle hypertonicity, right greater than left  -Bilateral SCM hypertonicity, right greater than left  Right C3, C4, C7 tender points    Treatment Method:  Counterstrain Treatment, Muscle Energy Treatment, Myofascial Release Treatment and Soft Tissue Treatment    Response:  Improved - The somatic dysfunction is improved but not completely resolved.    Thoracic T1 - T4 details:     Examination Method:  Range of Motion,  Contracture, Tissue Texture Change, Stability, Laxity, Effusions, Tone, Asymmetry, Misalignment, Crepitation, Defects, Masses and Tenderness, Pain    Severity:  Moderate    Osteopathic Findings:  -Bilateral paraspinal muscle hypertonicity, right greater than left  -Bilateral trapezius muscle hypertonicity, right greater than left  -T4 flexed side bent right rotated right with tender point on the right    Treatment Method:  Counterstrain Treatment, Muscle Energy Treatment, Soft Tissue Treatment and Myofascial Release Treatment    Response:  Improved    Right Upper Extremity details:     Examination Method:  Range of Motion, Contracture, Tissue Texture Change, Stability, Laxity, Effusions, Tone, Asymmetry, Misalignment, Crepitation, Defects, Masses and Tenderness, Pain    Severity:  Moderate    Osteopathic Findings:  - Deltoid hypertonicity on the right with tender point noted    Treatment Method:  Counterstrain Treatment, Articulatory Treatment and Muscle Energy Treatment    Response:  Improved - The somatic dysfunction is improved but not completely resolved.    Ribs details:     Examination Method:  Tissue Texture Change, Stability, Laxity, Effusions, Tone, Range of Motion, Contracture, Asymmetry, Misalignment, Crepitation, Defects, Masses and Tenderness, Pain    Severity:  Moderate    Osteopathic Findings:  - Bilateral first rib elevations with tender point on the right    Treatment Method:  Articulatory Treatment, Muscle Energy Treatment and Soft Tissue Treatment    Response:  Improved - The somatic dysfunction is improved but not completely resolved.    Total Regions Treated:  5

## 2025-02-26 NOTE — PROGRESS NOTES
HCC coding opportunities       Chart reviewed, no opportunity found: CHART REVIEWED, NO OPPORTUNITY FOUND        Patients Insurance        Commercial Insurance: [employer]

## 2025-03-05 ENCOUNTER — PROCEDURE VISIT (OUTPATIENT)
Dept: FAMILY MEDICINE CLINIC | Facility: CLINIC | Age: 60
End: 2025-03-05
Payer: COMMERCIAL

## 2025-03-05 ENCOUNTER — HOSPITAL ENCOUNTER (OUTPATIENT)
Dept: RADIOLOGY | Facility: HOSPITAL | Age: 60
Discharge: HOME | End: 2025-03-05
Payer: COMMERCIAL

## 2025-03-05 DIAGNOSIS — M99.08 SOMATIC DYSFUNCTION OF RIB CAGE REGION: ICD-10-CM

## 2025-03-05 DIAGNOSIS — M25.511 ACUTE PAIN OF RIGHT SHOULDER: ICD-10-CM

## 2025-03-05 DIAGNOSIS — M99.07 SOMATIC DYSFUNCTION OF UPPER EXTREMITY: ICD-10-CM

## 2025-03-05 DIAGNOSIS — M99.00 SOMATIC DYSFUNCTION OF HEAD REGION: ICD-10-CM

## 2025-03-05 DIAGNOSIS — M99.01 SOMATIC DYSFUNCTION OF CERVICAL REGION: ICD-10-CM

## 2025-03-05 DIAGNOSIS — M99.02 SOMATIC DYSFUNCTION OF THORACIC REGION: ICD-10-CM

## 2025-03-05 DIAGNOSIS — M54.12 CERVICAL RADICULOPATHY: Primary | ICD-10-CM

## 2025-03-05 PROCEDURE — 99213 OFFICE O/P EST LOW 20 MIN: CPT | Performed by: FAMILY MEDICINE

## 2025-03-05 PROCEDURE — 73030 X-RAY EXAM OF SHOULDER: CPT

## 2025-03-05 PROCEDURE — 98927 OSTEOPATH MANJ 5-6 REGIONS: CPT | Performed by: FAMILY MEDICINE

## 2025-03-05 PROCEDURE — 73060 X-RAY EXAM OF HUMERUS: CPT

## 2025-03-05 NOTE — PROGRESS NOTES
xrThe Assessment & Plan     This is a 59 y.o. female who presents for OMT follow-up for:  1. Cervical radiculopathy  OMT      2. Acute pain of right shoulder  XR shoulder 2+ vw right    XR humerus right    OMT      3. Somatic dysfunction of head region  OMT      4. Somatic dysfunction of cervical region  OMT      5. Somatic dysfunction of thoracic region  OMT      6. Somatic dysfunction of rib cage region  OMT      7. Somatic dysfunction of upper extremity  OMT           1. Patient tolerated OMT well for the above problems,  advised patient to drink fluids and can use NSAID for soreness after treatment     2. OMT Follow up in 4 weeks.    Subjective     Lennie Salinas is a 59 y.o. female and is here for a OMT follow up. The patient reports she has some continued right shoulder pain.  She did start formal physical therapy this week and has done 1 session.  She notes after her last visit he had been doing some home exercises.  She did do some further shoveling and she had very aggressive impact with a shovel and ice with some significant pain upper arm.  Now has significant tender point around mid humerus as well as upper shoulder.  We discussed getting x-ray imaging to evaluate for any bony abnormalities.  She also continues with her neck tightness especially on the right side.  Continues with upper back and trapezius tightness.    Is the patient taking Pain medication? yes  Has the patient completed physical therapy for this condition? yes  Did Patient symptoms improve from last OMT appointment? yes    The following portions of the patient's history were reviewed and updated as appropriate: allergies, current medications, past family history, past medical history, past social history, past surgical history, and problem list.    Review of Systems  Review of Systems   Constitutional:  Negative for chills and fever.   HENT:  Negative for ear pain and sore throat.    Eyes:  Negative for pain and visual disturbance.    Respiratory:  Negative for cough and shortness of breath.    Cardiovascular:  Negative for chest pain and palpitations.   Gastrointestinal:  Negative for abdominal pain and vomiting.   Genitourinary:  Negative for dysuria and hematuria.   Musculoskeletal:  Positive for neck pain. Negative for arthralgias and back pain.        Right shoulder pain, neck pain, right arm pain   Skin:  Negative for color change and rash.   Neurological:  Negative for dizziness, seizures, syncope, weakness, numbness and headaches.   Psychiatric/Behavioral:  Negative for confusion.    All other systems reviewed and are negative.        Objective     OMT Exam     OMT    Performed by: See Douglas DO  Authorized by: See Douglas DO  Universal Protocol:  procedure performed by consultantConsent: Verbal consent obtained.  Consent given by: patient  Patient identity confirmed: verbally with patient      Procedure Details:     Region evaluated and treated:  Head, Cervical, Thoracic, Ribs and Right Extremities    Extremity Information  Extremities: right upper extremity    Thoracic Information  Thoracic Region: T1 - T4  Head Details:     Examination Method:  Tissue Texture Change, Stability, Laxity, Effusions, Tone, Range of Motion, Contracture, Asymmetry, Misalignment, Crepitation, Defects, Masses and Tenderness, Pain    Severity:  Moderate    Osteopathic Findings:  - OA FSRRL  - R inion tender points    Treatment Method:  Counterstrain Treatment, Muscle Energy Treatment, Myofascial Release Treatment and Soft Tissue Treatment    Response:  Improved - The somatic dysfunction is improved but not completely resolved.    Cervical Details:     Examination Method:  Tissue Texture Change, Stability, Laxity, Effusions, Tone, Range of Motion, Contracture, Asymmetry, Misalignment, Crepitation, Defects, Masses and Tenderness, Pain    Severity:  Moderate    Osteopathic Findings:  - Bilateral paracervical muscle hypertonicity, right greater than  left  -Bilateral scalene muscle hypertonicity, right greater than left  -Bilateral SCM hypertonicity, right greater than left  Right -C5, C7 tender points    Treatment Method:  Counterstrain Treatment, Muscle Energy Treatment, Myofascial Release Treatment and Soft Tissue Treatment    Response:  Improved - The somatic dysfunction is improved but not completely resolved.    Thoracic T1 - T4 details:     Examination Method:  Tissue Texture Change, Stability, Laxity, Effusions, Tone, Range of Motion, Contracture, Asymmetry, Misalignment, Crepitation, Defects, Masses and Tenderness, Pain    Severity:  Moderate    Osteopathic Findings:  - Bilateral paraspinal muscle hypertonicity, right greater than left  -Bilateral trapezius muscle hypertonicity, right greater than left  -Left T1 tender point noted    Treatment Method:  Articulatory Treatment, Counterstrain Treatment, Muscle Energy Treatment, Myofascial Release Treatment and Soft Tissue Treatment    Response:  Improved    Right Upper Extremity details:     Examination Method:  Tissue Texture Change, Stability, Laxity, Effusions, Tone, Range of Motion, Contracture, Asymmetry, Misalignment, Crepitation, Defects, Masses and Tenderness, Pain    Severity:  Moderate    Osteopathic Findings:  Right deltoid hypertonicity and tender point noted, decreased internal rotation.    Treatment Method:  Articulatory Treatment, Counterstrain Treatment, Muscle Energy Treatment and Soft Tissue Treatment    Response:  Improved - The somatic dysfunction is improved but not completely resolved.    Ribs details:     Examination Method:  Tissue Texture Change, Stability, Laxity, Effusions, Tone, Range of Motion, Contracture, Asymmetry, Misalignment, Crepitation, Defects, Masses and Tenderness, Pain    Severity:  Moderate    Osteopathic Findings:  Bilateral first rib elevations    Treatment Method:  Articulatory Treatment, Muscle Energy Treatment and Soft Tissue Treatment    Response:  Improved -  The somatic dysfunction is improved but not completely resolved.    Total Regions Treated:  5

## 2025-03-06 ENCOUNTER — RESULTS FOLLOW-UP (OUTPATIENT)
Dept: FAMILY MEDICINE CLINIC | Facility: CLINIC | Age: 60
End: 2025-03-06

## 2025-03-06 NOTE — RESULT ENCOUNTER NOTE
Tomer Hogue,    X-rays show no fractures or dislocations.  Some mild arthritis changes in the shoulder joint.  Please continue as discussed with home exercise program and physical therapy.  Will continue to work on it with OMT as well.  Please let me know if you have any other questions.    Thank you  - Dr. DE SANTIAGO

## 2025-03-15 PROBLEM — K11.8 MASS OF RIGHT PAROTID GLAND: Status: ACTIVE | Noted: 2025-03-15

## 2025-03-15 PROBLEM — M54.81 OCCIPITAL NEURALGIA OF RIGHT SIDE: Status: ACTIVE | Noted: 2025-03-15

## 2025-03-15 PROBLEM — M25.511 CHRONIC RIGHT SHOULDER PAIN: Status: ACTIVE | Noted: 2025-03-15

## 2025-03-15 PROBLEM — G89.29 CHRONIC RIGHT SHOULDER PAIN: Status: ACTIVE | Noted: 2025-03-15

## 2025-03-15 PROBLEM — M35.00 SJOGREN'S SYNDROME WITHOUT EXTRAGLANDULAR INVOLVEMENT (HCC): Status: ACTIVE | Noted: 2025-03-15

## 2025-03-15 NOTE — ASSESSMENT & PLAN NOTE
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  Encouraged trial of paraffin wax bath for symptomatic relief.  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 No evidence clinically or with lab work for inflammatory arthritis.  Questionable Sjogren's syndrome primary versus secondary.  History Raynaud's without digital ulcerations.  Patient was referred back to physical therapy.  Encouraged home exercise program as tolerated.

## 2025-03-15 NOTE — ASSESSMENT & PLAN NOTE
Patient with history of positive MARYELLEN. Lupus AVISE from 10/19/2023 with negative index suggesting low likelihood for lupus. Her MARYELLEN by IgG and HEP-2 antibody are positive, however, all components of the MARYELLEN are negative. We will for updated serologies as well as repeat lupus Avise CTD testing in view of her history of Raynaud's, sicca symptoms, and parotid mass.  I am unable to palpate a discrete parotid mass at the exam at this visit, however, patient needs further investigation to rule out parotitis versus parotid gland benign tumor.  Additionally patient was referred to Dr. Juvenal Peguero, ENT, regarding the parotid gland mass and potential need for additional workup.  I will be in touch with the patient to review results of lab work when available.  Follow-up 6 months or sooner if needed.

## 2025-03-15 NOTE — ASSESSMENT & PLAN NOTE
Patient with history of positive MARYELLEN, with a history of sicca symptoms, Raynaud's, and more recently diagnosed right parotid gland mass.  Sjogren's antibodies were negative in the past, however she was noted to have positive carbonic anhydrase VI, with positive IgM and IgA and negative IgG.  Salivary protein antibody and parotid specific antibodies were negative.  She was diagnosed with lymphocytic colitis on colonoscopy from the fall 2023 and was given a prescription for budesonide which she did not take due to concerns for side effects.  She does use eyedrops for dry eyes.  Both ultrasound of the neck and MRI of the soft tissues of the neck were significant for right parotid mass measuring approximately 1 cm x 0.8 cm.  There is a question as to whether this was seen on a prior study from 2016, which was commented on the most recent imaging, however, when I reviewed the imaging from 2016, there is no documentation of a mass seen.  I have referred the patient to Dr. Peguero, ENT, for further evaluation of the parotid mass.  She may need a sialogram or biopsy.  Await recommendations from Dr. Peguero.  Continue symptomatic treatment for Sjogren's symptoms.  I did encourage the patient to be consistent with the anti-inflammatory diet which may be of benefit for her symptoms.  Continue to monitor.

## 2025-03-15 NOTE — ASSESSMENT & PLAN NOTE
History of lymphocytic colitis noted on colonoscopy from fall 2023.  Patient was not willing to take budesonide due to potential for side effects, however, her GI symptoms had improved with food elimination diet.  At present she does have IBS symptoms with alternating diarrhea and constipation as she has not been with free and dairy free diet.  I did encourage her to do the full anti-inflammatory diet.  She does have a book reference for this.  Continue to monitor.  Follow-up GI as scheduled.   Doxepin Pregnancy And Lactation Text: This medication is Pregnancy Category C and it isn't known if it is safe during pregnancy. It is also excreted in breast milk and breast feeding isn't recommended.

## 2025-03-15 NOTE — ASSESSMENT & PLAN NOTE
Fibromyalgia with associated triggers, and sleep disturbance with fatigue. Symptoms had improved significantly with following a gluten-free and dairy free diet, however, she has not been as consistent with the diet as she had in the past. She does note that certain foods do precipitate GI symptoms or generalized malaise. I have encouraged her to be consistent with the anti-inflammatory diet.  She was referred for physical therapy back to [facility] where she had success in the past.  I asked the therapist to work on her cervicalgia as well as right shoulder girdle and upper extremity symptoms.  She may have a component of cubital tunnel syndrome as well.  Follow-up 6 months or sooner if needed.  Continue to monitor.

## 2025-03-15 NOTE — ASSESSMENT & PLAN NOTE
Right parotid mass noted on ultrasound of the neck from October 2024 and MRI of the soft tissue of the neck from November 2024.  She does have dry mouth when eating sugar and has had complaints of nasal and mouth sores in addition to dysphonia.  I am unable to palpate a discrete parotid mass on exam at this visit, however, she was referred to Dr. Peguero, ENT specialist to further evaluate and manage.  Rule out parotitis versus benign parotid gland tumor.  Continue to monitor.

## 2025-03-15 NOTE — ASSESSMENT & PLAN NOTE
IBS with alternating diarrhea and constipation and history of GERD.  She did have success when she was more strictly following gluten-free and dairy free diet.  Have encouraged her to be consistent with the anti-inflammatory diet for potential benefit.  She does have a book reference laboratory diet.  She does follow with GI.  Continue to monitor.

## 2025-03-15 NOTE — ASSESSMENT & PLAN NOTE
Cervicalgia with multiple triggers posterior cervical and shoulder muscles as well as tenderness right occipital groove.  Patient referred for physical therapy.  She does have evidence of mild multilevel degenerative changes on MRI of the soft tissue of the neck from November 2024.  Continue to monitor.

## 2025-03-15 NOTE — ASSESSMENT & PLAN NOTE
Cervicalgia with multiple triggers posterior cervical and shoulder girdle muscles right greater than left, with positive Tinel's right medial elbow consistent with cubital tunnel syndrome.  She does have tenderness in the right occipital groove as well.  Patient referred to physical therapy at [facility] where she has had benefit in the past.  She is not eager for prescription medication, however, if symptoms persist, may need to reconsider.  Plan follow-up in 6 months or sooner if needed.  Continue to monitor.

## 2025-03-15 NOTE — ASSESSMENT & PLAN NOTE
Patient has prior history of vague paresthesias and numbness right upper extremity with EMG right upper extremity unremarkable.  Currently she has right shoulder pain radiating into the upper extremity with positive Tinel's right elbow.  Rule out cubital tunnel syndrome.  Rule out small fiber neuropathy related to fibromyalgia.  Patient has been referred for physical therapy.  Could consider medication if symptoms warrant.  Continue to monitor.

## 2025-03-29 ENCOUNTER — APPOINTMENT (OUTPATIENT)
Dept: LAB | Facility: CLINIC | Age: 60
End: 2025-03-29
Payer: COMMERCIAL

## 2025-03-29 DIAGNOSIS — M35.9 UNDIFFERENTIATED CONNECTIVE TISSUE DISEASE (HCC): ICD-10-CM

## 2025-03-29 DIAGNOSIS — M35.00 SJOGREN'S SYNDROME WITHOUT EXTRAGLANDULAR INVOLVEMENT (HCC): ICD-10-CM

## 2025-03-29 LAB
ALBUMIN SERPL BCG-MCNC: 4.6 G/DL (ref 3.5–5)
ALP SERPL-CCNC: 94 U/L (ref 34–104)
ALT SERPL W P-5'-P-CCNC: 20 U/L (ref 7–52)
ANION GAP SERPL CALCULATED.3IONS-SCNC: 8 MMOL/L (ref 4–13)
AST SERPL W P-5'-P-CCNC: 22 U/L (ref 13–39)
BACTERIA UR QL AUTO: ABNORMAL /HPF
BASOPHILS # BLD AUTO: 0.04 THOUSANDS/ÂΜL (ref 0–0.1)
BASOPHILS NFR BLD AUTO: 1 % (ref 0–1)
BILIRUB SERPL-MCNC: 0.77 MG/DL (ref 0.2–1)
BILIRUB UR QL STRIP: NEGATIVE
BUN SERPL-MCNC: 13 MG/DL (ref 5–25)
C3 SERPL-MCNC: 136 MG/DL (ref 87–200)
C4 SERPL-MCNC: 37 MG/DL (ref 19–52)
CALCIUM SERPL-MCNC: 9.4 MG/DL (ref 8.4–10.2)
CHLORIDE SERPL-SCNC: 101 MMOL/L (ref 96–108)
CLARITY UR: CLEAR
CO2 SERPL-SCNC: 29 MMOL/L (ref 21–32)
COLOR UR: COLORLESS
CREAT SERPL-MCNC: 0.78 MG/DL (ref 0.6–1.3)
CRP SERPL QL: 1.1 MG/L
EOSINOPHIL # BLD AUTO: 0.05 THOUSAND/ÂΜL (ref 0–0.61)
EOSINOPHIL NFR BLD AUTO: 1 % (ref 0–6)
ERYTHROCYTE [DISTWIDTH] IN BLOOD BY AUTOMATED COUNT: 13.3 % (ref 11.6–15.1)
GFR SERPL CREATININE-BSD FRML MDRD: 84 ML/MIN/1.73SQ M
GLUCOSE P FAST SERPL-MCNC: 92 MG/DL (ref 65–99)
GLUCOSE UR STRIP-MCNC: NEGATIVE MG/DL
HCT VFR BLD AUTO: 44.4 % (ref 34.8–46.1)
HGB BLD-MCNC: 15.1 G/DL (ref 11.5–15.4)
HGB UR QL STRIP.AUTO: ABNORMAL
IGA SERPL-MCNC: 149 MG/DL (ref 66–433)
IGG SERPL-MCNC: 668 MG/DL (ref 635–1741)
IGM SERPL-MCNC: 147 MG/DL (ref 45–281)
IMM GRANULOCYTES # BLD AUTO: 0.01 THOUSAND/UL (ref 0–0.2)
IMM GRANULOCYTES NFR BLD AUTO: 0 % (ref 0–2)
KETONES UR STRIP-MCNC: NEGATIVE MG/DL
LEUKOCYTE ESTERASE UR QL STRIP: NEGATIVE
LYMPHOCYTES # BLD AUTO: 2.29 THOUSANDS/ÂΜL (ref 0.6–4.47)
LYMPHOCYTES NFR BLD AUTO: 51 % (ref 14–44)
MCH RBC QN AUTO: 30.5 PG (ref 26.8–34.3)
MCHC RBC AUTO-ENTMCNC: 34 G/DL (ref 31.4–37.4)
MCV RBC AUTO: 90 FL (ref 82–98)
MONOCYTES # BLD AUTO: 0.41 THOUSAND/ÂΜL (ref 0.17–1.22)
MONOCYTES NFR BLD AUTO: 9 % (ref 4–12)
NEUTROPHILS # BLD AUTO: 1.7 THOUSANDS/ÂΜL (ref 1.85–7.62)
NEUTS SEG NFR BLD AUTO: 38 % (ref 43–75)
NITRITE UR QL STRIP: NEGATIVE
NON-SQ EPI CELLS URNS QL MICRO: ABNORMAL /HPF
NRBC BLD AUTO-RTO: 0 /100 WBCS
PH UR STRIP.AUTO: 7.5 [PH]
PLATELET # BLD AUTO: 211 THOUSANDS/UL (ref 149–390)
PMV BLD AUTO: 11.8 FL (ref 8.9–12.7)
POTASSIUM SERPL-SCNC: 3.7 MMOL/L (ref 3.5–5.3)
PROT SERPL-MCNC: 7.2 G/DL (ref 6.4–8.4)
PROT UR STRIP-MCNC: NEGATIVE MG/DL
RBC # BLD AUTO: 4.95 MILLION/UL (ref 3.81–5.12)
RBC #/AREA URNS AUTO: ABNORMAL /HPF
SODIUM SERPL-SCNC: 138 MMOL/L (ref 135–147)
SP GR UR STRIP.AUTO: 1.01 (ref 1–1.03)
UROBILINOGEN UR STRIP-ACNC: <2 MG/DL
WBC # BLD AUTO: 4.5 THOUSAND/UL (ref 4.31–10.16)
WBC #/AREA URNS AUTO: ABNORMAL /HPF

## 2025-03-29 PROCEDURE — 80053 COMPREHEN METABOLIC PANEL: CPT

## 2025-03-29 PROCEDURE — 85025 COMPLETE CBC W/AUTO DIFF WBC: CPT

## 2025-03-29 PROCEDURE — 82784 ASSAY IGA/IGD/IGG/IGM EACH: CPT

## 2025-03-29 PROCEDURE — 86160 COMPLEMENT ANTIGEN: CPT

## 2025-03-29 PROCEDURE — 86140 C-REACTIVE PROTEIN: CPT

## 2025-03-29 PROCEDURE — 84165 PROTEIN E-PHORESIS SERUM: CPT

## 2025-03-29 PROCEDURE — 36415 COLL VENOUS BLD VENIPUNCTURE: CPT

## 2025-03-31 LAB
ALBUMIN SERPL ELPH-MCNC: 4.3 G/DL (ref 3.2–5.1)
ALBUMIN SERPL ELPH-MCNC: 63.3 % (ref 48–70)
ALPHA1 GLOB SERPL ELPH-MCNC: 0.28 G/DL (ref 0.15–0.47)
ALPHA1 GLOB SERPL ELPH-MCNC: 4.1 % (ref 1.8–7)
ALPHA2 GLOB SERPL ELPH-MCNC: 0.73 G/DL (ref 0.42–1.04)
ALPHA2 GLOB SERPL ELPH-MCNC: 10.8 % (ref 5.9–14.9)
BETA GLOB ABNORMAL SERPL ELPH-MCNC: 0.46 G/DL (ref 0.31–0.57)
BETA1 GLOB SERPL ELPH-MCNC: 6.7 % (ref 4.7–7.7)
BETA2 GLOB SERPL ELPH-MCNC: 5 % (ref 3.1–7.9)
BETA2+GAMMA GLOB SERPL ELPH-MCNC: 0.34 G/DL (ref 0.2–0.58)
GAMMA GLOB ABNORMAL SERPL ELPH-MCNC: 0.69 G/DL (ref 0.4–1.66)
GAMMA GLOB SERPL ELPH-MCNC: 10.1 % (ref 6.9–22.3)
IGG/ALB SER: 1.72 {RATIO} (ref 1.1–1.8)
PROT PATTERN SERPL ELPH-IMP: NORMAL
PROT SERPL-MCNC: 6.8 G/DL (ref 6.4–8.2)

## 2025-03-31 PROCEDURE — 84165 PROTEIN E-PHORESIS SERUM: CPT | Performed by: PATHOLOGY

## 2025-04-02 ENCOUNTER — PROCEDURE VISIT (OUTPATIENT)
Dept: FAMILY MEDICINE CLINIC | Facility: CLINIC | Age: 60
End: 2025-04-02
Payer: COMMERCIAL

## 2025-04-02 ENCOUNTER — RESULTS FOLLOW-UP (OUTPATIENT)
Age: 60
End: 2025-04-02

## 2025-04-02 DIAGNOSIS — M99.01 SOMATIC DYSFUNCTION OF CERVICAL REGION: ICD-10-CM

## 2025-04-02 DIAGNOSIS — M99.07 SOMATIC DYSFUNCTION OF UPPER EXTREMITY: ICD-10-CM

## 2025-04-02 DIAGNOSIS — M99.00 SOMATIC DYSFUNCTION OF HEAD REGION: ICD-10-CM

## 2025-04-02 DIAGNOSIS — R31.29 MICROHEMATURIA: Primary | ICD-10-CM

## 2025-04-02 DIAGNOSIS — M54.12 CERVICAL RADICULOPATHY: Primary | ICD-10-CM

## 2025-04-02 DIAGNOSIS — M25.511 ACUTE PAIN OF RIGHT SHOULDER: ICD-10-CM

## 2025-04-02 DIAGNOSIS — M99.08 SOMATIC DYSFUNCTION OF RIB CAGE REGION: ICD-10-CM

## 2025-04-02 DIAGNOSIS — M99.02 SOMATIC DYSFUNCTION OF THORACIC REGION: ICD-10-CM

## 2025-04-02 PROCEDURE — 98927 OSTEOPATH MANJ 5-6 REGIONS: CPT | Performed by: FAMILY MEDICINE

## 2025-04-04 NOTE — PROGRESS NOTES
The Assessment & Plan     This is a 59 y.o. female who presents for OMT follow-up for:  1. Cervical radiculopathy  OMT      2. Somatic dysfunction of head region  OMT      3. Somatic dysfunction of cervical region  OMT      4. Somatic dysfunction of thoracic region  OMT      5. Somatic dysfunction of rib cage region  OMT      6. Somatic dysfunction of upper extremity  OMT      7. Acute pain of right shoulder  OMT           1. Patient tolerated OMT well for the above problems,  advised patient to drink fluids and can use NSAID for soreness after treatment     2. OMT Follow up in 2 weeks.    Subjective     Lennie Salinas is a 59 y.o. female and is here for a OMT follow up. The patient reports continues with some right-sided neck pain, upper back and right-sided shoulder pain.  She denies any significant numbness or tingling.  Does feel symptoms at her  is weaker.  She has a lot of discomfort.  No new trauma or injury.  She does admit to flareups after more activity.  Has been doing some stretches on occasion however not too often.  No other concerns today.    Is the patient taking Pain medication? no  Has the patient completed physical therapy for this condition? no  Did Patient symptoms improve from last OMT appointment? yes    The following portions of the patient's history were reviewed and updated as appropriate: allergies, current medications, past family history, past medical history, past social history, past surgical history, and problem list.    Review of Systems  Review of Systems   Constitutional:  Negative for chills and fever.   HENT:  Negative for ear pain and sore throat.    Eyes:  Negative for pain and visual disturbance.   Respiratory:  Negative for cough and shortness of breath.    Cardiovascular:  Negative for chest pain and palpitations.   Gastrointestinal:  Negative for abdominal pain and vomiting.   Genitourinary:  Negative for dysuria and hematuria.   Musculoskeletal:  Positive for back  pain and neck pain. Negative for arthralgias.   Skin:  Negative for color change and rash.   Neurological:  Negative for seizures and syncope.   All other systems reviewed and are negative.        Objective     OMT Exam     OMT    Performed by: See Douglas DO  Authorized by: See Douglas DO  Universal Protocol:  procedure performed by consultantConsent: Verbal consent obtained.  Consent given by: patient  Patient identity confirmed: verbally with patient      Procedure Details:     Region evaluated and treated:  Head, Cervical, Thoracic, Ribs and Right Extremities    Extremity Information  Extremities: right upper extremity    Thoracic Information  Thoracic Region: T1 - T4  Head Details:     Examination Method:  Tissue Texture Change, Stability, Laxity, Effusions, Tone, Range of Motion, Contracture, Asymmetry, Misalignment, Crepitation, Defects, Masses and Tenderness, Pain    Severity:  Moderate    Osteopathic Findings:  - OA FSRRL  - Right inion tender point    Treatment Method:  Counterstrain Treatment, Muscle Energy Treatment, Myofascial Release Treatment and Soft Tissue Treatment    Response:  Improved - The somatic dysfunction is improved but not completely resolved.    Cervical Details:     Examination Method:  Tissue Texture Change, Stability, Laxity, Effusions, Tone, Range of Motion, Contracture, Asymmetry, Misalignment, Crepitation, Defects, Masses and Tenderness, Pain    Severity:  Moderate    Osteopathic Findings:  Bilateral paracervical muscle hypertonicity, right greater than left  -Bilateral scalene muscle hypertonicity, right greater than left  Right: C5, C7 tender point    Treatment Method:  Counterstrain Treatment, Muscle Energy Treatment, Myofascial Release Treatment and Soft Tissue Treatment    Response:  Improved - The somatic dysfunction is improved but not completely resolved.    Thoracic T1 - T4 details:     Examination Method:  Tissue Texture Change, Stability, Laxity, Effusions,  Tone, Range of Motion, Contracture, Asymmetry, Misalignment, Crepitation, Defects, Masses and Tenderness, Pain    Severity:  Moderate    Osteopathic Findings:  Bilateral paraspinal muscle hypertonicity, right greater than left  Bilateral trapezius muscle hypertonicity, right greater than left  T4 flexed side bent right rotated right with tender point on the right    Treatment Method:  Counterstrain Treatment, Muscle Energy Treatment, Myofascial Release Treatment and Soft Tissue Treatment    Response:  Improved    Right Upper Extremity details:     Examination Method:  Tissue Texture Change, Stability, Laxity, Effusions, Tone, Range of Motion, Contracture, Asymmetry, Misalignment, Crepitation, Defects, Masses and Tenderness, Pain    Severity:  Moderate    Osteopathic Findings:  Decreased range of motion in internal rotation, deltoid hypertonicity noted,    Treatment Method:  Articulatory Treatment, Muscle Energy Treatment and Soft Tissue Treatment    Response:  Improved - The somatic dysfunction is improved but not completely resolved.    Ribs details:     Examination Method:  Tissue Texture Change, Stability, Laxity, Effusions, Tone, Range of Motion, Contracture, Asymmetry, Misalignment, Crepitation, Defects, Masses and Tenderness, Pain    Severity:  Moderate    Osteopathic Findings:  Bilateral first rib elevations    Treatment Method:  Articulatory Treatment, Muscle Energy Treatment and Soft Tissue Treatment    Response:  Improved - The somatic dysfunction is improved but not completely resolved.    Total Regions Treated:  5

## 2025-05-03 ENCOUNTER — APPOINTMENT (OUTPATIENT)
Dept: LAB | Facility: CLINIC | Age: 60
End: 2025-05-03
Payer: COMMERCIAL

## 2025-05-03 ENCOUNTER — NURSE TRIAGE (OUTPATIENT)
Dept: OTHER | Facility: OTHER | Age: 60
End: 2025-05-03

## 2025-05-03 DIAGNOSIS — R35.0 URINARY FREQUENCY: Primary | ICD-10-CM

## 2025-05-03 DIAGNOSIS — R35.0 URINARY FREQUENCY: ICD-10-CM

## 2025-05-03 PROCEDURE — 87086 URINE CULTURE/COLONY COUNT: CPT

## 2025-05-04 LAB — BACTERIA UR CULT: NORMAL

## 2025-05-05 DIAGNOSIS — R30.0 DYSURIA: Primary | ICD-10-CM

## 2025-05-05 NOTE — TELEPHONE ENCOUNTER
"FOLLOW UP: Please contact patient with urine results    REASON FOR CONVERSATION: Possible UTI    SYMPTOMS: Urinary frequency, burning, and back pain    OTHER: Pt has active lab order for UA w Reflex in chart    DISPOSITION: See PCP Within 24 Hours. Per last result note on 4/2, a clean catch UA was placed. If pt is having urinary symptoms, she should complete urine culture as well. Pt will go to the  lab to have urine labs completed today.         Reason for Disposition   Urinating more frequently than usual (i.e., frequency) OR new-onset of the feeling of an urgent need to urinate (i.e., urgency)    Answer Assessment - Initial Assessment Questions  1. SYMPTOM: \"What's the main symptom you're concerned about?\" (e.g., frequency, incontinence)        Frequency     2. ONSET: \"When did the  symptoms   start?\"        4/29     3. PAIN: \"Is there any pain?\" If Yes, ask: \"How bad is it?\" (Scale: 1-10; mild, moderate, severe)        Mild pain     4. CAUSE: \"What do you think is causing the symptoms?\"        UTI    5. OTHER SYMPTOMS: \"Do you have any other symptoms?\" (e.g., blood in urine, fever, flank pain, pain with urination)        Burning, urinary frequency, back pain,     Azo and drinking water with no relief    Protocols used: Urinary Symptoms-Adult-AH    "
"Regarding: Blood in urine / Pain  ----- Message from Clarissa S sent at 5/3/2025 11:31 AM EDT -----  Pt stated, \"About a month ago I had blood in my urine, I got it again today with frequent urination and slight pain. I also have slight pain on my back and I was wondering where can I go for a urine analysis. \"    "
Dr. Valle    Urine Culture    Notified pt as per Dr. Valle 05/05/25 notation. Pt states completed Urine Culture 5/3/25.    Resulted: 05/03/25 Urine Culture   20,000-29,000 cfu/ml  Mixed Contaminants X3    Pt to follow up with PCP as directed.        
No

## 2025-05-07 ENCOUNTER — RESULTS FOLLOW-UP (OUTPATIENT)
Age: 60
End: 2025-05-07

## 2025-05-14 ENCOUNTER — RA CDI HCC (OUTPATIENT)
Dept: OTHER | Facility: HOSPITAL | Age: 60
End: 2025-05-14

## 2025-05-14 NOTE — PROGRESS NOTES
HCC coding opportunities       Chart reviewed, no opportunity found: CHART REVIEWED, NO OPPORTUNITY FOUND        Patients Insurance        Commercial Insurance: [employer]

## 2025-05-21 ENCOUNTER — PROCEDURE VISIT (OUTPATIENT)
Dept: FAMILY MEDICINE CLINIC | Facility: CLINIC | Age: 60
End: 2025-05-21
Payer: COMMERCIAL

## 2025-05-21 DIAGNOSIS — M25.511 ACUTE PAIN OF RIGHT SHOULDER: ICD-10-CM

## 2025-05-21 DIAGNOSIS — M99.02 SOMATIC DYSFUNCTION OF THORACIC REGION: ICD-10-CM

## 2025-05-21 DIAGNOSIS — M99.08 SOMATIC DYSFUNCTION OF RIB CAGE REGION: ICD-10-CM

## 2025-05-21 DIAGNOSIS — M99.01 SOMATIC DYSFUNCTION OF CERVICAL REGION: ICD-10-CM

## 2025-05-21 DIAGNOSIS — M99.07 SOMATIC DYSFUNCTION OF UPPER EXTREMITY: ICD-10-CM

## 2025-05-21 DIAGNOSIS — M99.00 SOMATIC DYSFUNCTION OF HEAD REGION: ICD-10-CM

## 2025-05-21 DIAGNOSIS — R31.29 MICROSCOPIC HEMATURIA: ICD-10-CM

## 2025-05-21 DIAGNOSIS — M54.12 CERVICAL RADICULOPATHY: Primary | ICD-10-CM

## 2025-05-21 PROCEDURE — 98927 OSTEOPATH MANJ 5-6 REGIONS: CPT | Performed by: FAMILY MEDICINE

## 2025-05-27 NOTE — PROGRESS NOTES
The Assessment & Plan     This is a 59 y.o. female who presents for OMT follow-up for:  1. Cervical radiculopathy  OMT      2. Microscopic hematuria  Ambulatory Referral to Urogynecology      3. Somatic dysfunction of head region  OMT      4. Somatic dysfunction of cervical region  OMT      5. Somatic dysfunction of thoracic region  OMT      6. Somatic dysfunction of rib cage region  OMT      7. Somatic dysfunction of upper extremity  OMT      8. Acute pain of right shoulder  OMT           1. Patient tolerated OMT well for the above problems,  advised patient to drink fluids and can use NSAID for soreness after treatment     2. OMT Follow up in 4 weeks.    Subjective     Lennie Salinas is a 59 y.o. female and is here for a OMT follow up. The patient reports she has some right-sided neck pain going down into her right shoulder today.  She notes overall she does get some relief after OMT sessions.  She does note that she has tried to continue with some home exercises however does tighten up with stressors.  Denies any new injury or trauma.  Denies any weakness into her right arm.  Does get some numbness and tingling sensation on occasion down the arm.  No other concerns today.    Is the patient taking Pain medication? no  Has the patient completed physical therapy for this condition? no  Did Patient symptoms improve from last OMT appointment? yes    The following portions of the patient's history were reviewed and updated as appropriate: allergies, current medications, past family history, past medical history, past social history, past surgical history, and problem list.    Review of Systems  Review of Systems   Constitutional:  Negative for chills and fever.   HENT:  Negative for ear pain and sore throat.    Eyes:  Negative for pain and visual disturbance.   Respiratory:  Negative for cough and shortness of breath.    Cardiovascular:  Negative for chest pain and palpitations.   Gastrointestinal:  Negative for  abdominal pain and vomiting.   Genitourinary:  Negative for dysuria and hematuria.   Musculoskeletal:  Positive for neck pain. Negative for arthralgias and back pain.        Right shoulder pain   Skin:  Negative for color change and rash.   Neurological:  Negative for seizures and syncope.   All other systems reviewed and are negative.        Objective     OMT Exam     OMT    Performed by: See Douglas DO  Authorized by: See Douglas DO    Universal Protocol:  procedure performed by consultantConsent: Verbal consent obtained  Consent given by: patient  Patient identity confirmed: verbally with patient      Procedure Details:     Region evaluated and treated:  Head, Cervical, Thoracic, Ribs and Right Extremities    Extremity Information  Extremities: right upper extremity    Thoracic Information  Thoracic Region: T1 - T4  Head Details:     Examination Method:  Tissue Texture Change, Stability, Laxity, Effusions, Tone, Range of Motion, Contracture, Asymmetry, Misalignment, Crepitation, Defects, Masses and Tenderness, Pain    Severity:  Moderate    Osteopathic Findings:  - OA F SRRL  - Right inion tender point    Treatment Method:  Counterstrain Treatment, Muscle Energy Treatment and Soft Tissue Treatment    Response:  Improved - The somatic dysfunction is improved but not completely resolved.    Cervical Details:     Examination Method:  Tissue Texture Change, Stability, Laxity, Effusions, Tone, Range of Motion, Contracture, Asymmetry, Misalignment, Crepitation, Defects, Masses and Tenderness, Pain    Severity:  Moderate    Osteopathic Findings:  Bilateral paracervical muscle hypertonicity, right greater than left  Bilateral scalene muscle hypertonicity, right greater than left  Bilateral SCM hypertonicity, right greater than left  Right: C3, C7 tender points noted    Treatment Method:  Counterstrain Treatment, Muscle Energy Treatment, Myofascial Release Treatment and Soft Tissue Treatment    Response:   Improved - The somatic dysfunction is improved but not completely resolved.    Thoracic T1 - T4 details:     Examination Method:  Tissue Texture Change, Stability, Laxity, Effusions, Tone, Range of Motion, Contracture, Asymmetry, Misalignment, Crepitation, Defects, Masses and Tenderness, Pain    Severity:  Moderate    Osteopathic Findings:  Bilateral paraspinal muscle hypertonicity, right greater than left  T4 flexed side right rotated right with tender point in the right    Treatment Method:  Counterstrain Treatment, Muscle Energy Treatment, Myofascial Release Treatment and Soft Tissue Treatment    Response:  Improved    Right Upper Extremity details:     Examination Method:  Tissue Texture Change, Stability, Laxity, Effusions, Tone, Range of Motion, Contracture, Asymmetry, Misalignment, Crepitation, Defects, Masses and Tenderness, Pain    Severity:  Moderate    Osteopathic Findings:  -Decreased internal rotation of right shoulder  -Deltoid hypertonicity noted with tender point    Treatment Method:  Articulatory Treatment, Counterstrain Treatment and Soft Tissue Treatment    Response:  Improved - The somatic dysfunction is improved but not completely resolved.    Ribs details:     Examination Method:  Tissue Texture Change, Stability, Laxity, Effusions, Tone, Range of Motion, Contracture, Asymmetry, Misalignment, Crepitation, Defects, Masses and Tenderness, Pain    Severity:  Moderate    Osteopathic Findings:  Right first rib elevation noted and tender point noted    Treatment Method:  Articulatory Treatment, Myofascial Release Treatment and Muscle Energy Treatment    Response:  Improved - The somatic dysfunction is improved but not completely resolved.    Total Regions Treated:  5

## 2025-06-03 ENCOUNTER — PROCEDURE VISIT (OUTPATIENT)
Dept: FAMILY MEDICINE CLINIC | Facility: CLINIC | Age: 60
End: 2025-06-03
Payer: COMMERCIAL

## 2025-06-03 DIAGNOSIS — M99.01 SOMATIC DYSFUNCTION OF CERVICAL REGION: ICD-10-CM

## 2025-06-03 DIAGNOSIS — M99.00 SOMATIC DYSFUNCTION OF HEAD REGION: ICD-10-CM

## 2025-06-03 DIAGNOSIS — M25.511 ACUTE PAIN OF RIGHT SHOULDER: ICD-10-CM

## 2025-06-03 DIAGNOSIS — M99.02 SOMATIC DYSFUNCTION OF THORACIC REGION: ICD-10-CM

## 2025-06-03 DIAGNOSIS — M54.12 CERVICAL RADICULOPATHY: Primary | ICD-10-CM

## 2025-06-03 DIAGNOSIS — M99.08 SOMATIC DYSFUNCTION OF RIB CAGE REGION: ICD-10-CM

## 2025-06-03 DIAGNOSIS — M99.07 SOMATIC DYSFUNCTION OF UPPER EXTREMITY: ICD-10-CM

## 2025-06-03 PROCEDURE — 98927 OSTEOPATH MANJ 5-6 REGIONS: CPT | Performed by: FAMILY MEDICINE

## 2025-06-11 NOTE — PROGRESS NOTES
The Assessment & Plan     This is a 59 y.o. female who presents for OMT follow-up for:  1. Cervical radiculopathy  OMT      2. Acute pain of right shoulder  OMT      3. Somatic dysfunction of head region  OMT      4. Somatic dysfunction of cervical region  OMT      5. Somatic dysfunction of thoracic region  OMT      6. Somatic dysfunction of rib cage region  OMT      7. Somatic dysfunction of upper extremity  OMT           1. Patient tolerated OMT well for the above problems,  advised patient to drink fluids and can use NSAID for soreness after treatment     2. OMT Follow up in 3 weeks.    Subjective     Lennie Salinas is a 59 y.o. female and is here for a OMT follow up. The patient reports that she continues with neck and upper back tightness mostly on the right side.  Denies any new injury or trauma.  She denies any weakness.  Denies any numbness or tingling into her arms or hands.  Does note after our last OMT session that that she did get some movement however noting after some stressors or increased activity tightens back up.  No trauma or injury.    Is the patient taking Pain medication? no  Has the patient completed physical therapy for this condition? yes  Did Patient symptoms improve from last OMT appointment? yes    The following portions of the patient's history were reviewed and updated as appropriate: allergies, current medications, past family history, past medical history, past social history, past surgical history, and problem list.    Review of Systems  Review of Systems   Constitutional:  Negative for chills and fever.   HENT:  Negative for ear pain and sore throat.    Eyes:  Negative for pain and visual disturbance.   Respiratory:  Negative for cough and shortness of breath.    Cardiovascular:  Negative for chest pain and palpitations.   Gastrointestinal:  Negative for abdominal pain and vomiting.   Genitourinary:  Negative for dysuria and hematuria.   Musculoskeletal:  Positive for back pain and  neck pain. Negative for arthralgias.   Skin:  Negative for color change and rash.   Neurological:  Negative for seizures and syncope.   Psychiatric/Behavioral:  Negative for confusion, sleep disturbance and suicidal ideas. The patient is not nervous/anxious.    All other systems reviewed and are negative.        Objective     OMT Exam     OMT    Performed by: See Douglas DO  Authorized by: See Douglas DO    Universal Protocol:  procedure performed by consultantConsent: Verbal consent obtained  Consent given by: patient  Patient identity confirmed: verbally with patient      Procedure Details:     Region evaluated and treated:  Head, Cervical, Thoracic, Right Extremities and Ribs    Extremity Information  Extremities: right upper extremity    Thoracic Information  Thoracic Region: T1 - T4 and T5 - T9  Head Details:     Examination Method:  Tissue Texture Change, Stability, Laxity, Effusions, Tone, Range of Motion, Contracture, Asymmetry, Misalignment, Crepitation, Defects, Masses and Tenderness, Pain    Severity:  Moderate    Osteopathic Findings:  - OA FSRRL   - R inion tender point    Treatment Method:  Counterstrain Treatment, Muscle Energy Treatment, Myofascial Release Treatment and Soft Tissue Treatment    Response:  Improved - The somatic dysfunction is improved but not completely resolved.    Cervical Details:     Examination Method:  Tissue Texture Change, Stability, Laxity, Effusions, Tone, Range of Motion, Contracture, Asymmetry, Misalignment, Crepitation, Defects, Masses and Tenderness, Pain    Severity:  Moderate    Osteopathic Findings:  - Bilateral paracervical muscle hypertonicity, right greater than left  -Bilateral scalene muscle hypertonicity, right greater than left  - Bilateral SCM hypertonicity right  -Right: C5, C7 tender points    Treatment Method:  Counterstrain Treatment, Muscle Energy Treatment, Myofascial Release Treatment and Soft Tissue Treatment    Response:  Improved - The  somatic dysfunction is improved but not completely resolved.    Thoracic T1 - T4 details:     Examination Method:  Tissue Texture Change, Stability, Laxity, Effusions, Tone, Range of Motion, Contracture, Asymmetry, Misalignment, Crepitation, Defects, Masses and Tenderness, Pain    Severity:  Moderate    Osteopathic Findings:  - Bilateral paraspinal muscle hypertonicity, right greater than left  -Bilateral trapezius muscle hypertonicity, right greater than left  -T4 flexed side bent right rotated right with tender point on the right    Treatment Method:  Counterstrain Treatment, Muscle Energy Treatment, Myofascial Release Treatment and Soft Tissue Treatment    Response:  Improved    Thoracic T5 - T9 details:     Examination Method:  Tissue Texture Change, Stability, Laxity, Effusions, Tone, Range of Motion, Contracture, Asymmetry, Misalignment, Crepitation, Defects, Masses and Tenderness, Pain    Severity:  Moderate    Osteopathic Findings:  Bilateral paraspinal muscle hypertonicity, right greater than left  -T7 flexed side bent right rotated right    Treatment Method:  Muscle Energy Treatment, Myofascial Release Treatment and Soft Tissue Treatment    Right Upper Extremity details:     Examination Method:  Tissue Texture Change, Stability, Laxity, Effusions, Tone, Range of Motion, Contracture, Asymmetry, Misalignment, Crepitation, Defects, Masses and Tenderness, Pain    Severity:  Moderate    Osteopathic Findings:  Right deltoid hypertonicity and tender point noted  -Decreased range of motion in internal rotation.    Treatment Method:  Articulatory Treatment, Muscle Energy Treatment and Soft Tissue Treatment    Response:  Improved - The somatic dysfunction is improved but not completely resolved.    Ribs details:     Examination Method:  Tissue Texture Change, Stability, Laxity, Effusions, Tone, Range of Motion, Contracture, Asymmetry, Misalignment, Crepitation, Defects, Masses and Tenderness, Pain    Severity:   Moderate    Osteopathic Findings:  Right first rib elevation and tender point noted    Treatment Method:  Articulatory Treatment, Muscle Energy Treatment and Soft Tissue Treatment    Response:  Improved - The somatic dysfunction is improved but not completely resolved.    Total Regions Treated:  5

## 2025-07-10 ENCOUNTER — ANNUAL EXAM (OUTPATIENT)
Dept: OBGYN CLINIC | Facility: CLINIC | Age: 60
End: 2025-07-10
Payer: COMMERCIAL

## 2025-07-10 VITALS
BODY MASS INDEX: 17.48 KG/M2 | SYSTOLIC BLOOD PRESSURE: 146 MMHG | HEIGHT: 62 IN | DIASTOLIC BLOOD PRESSURE: 70 MMHG | WEIGHT: 95 LBS

## 2025-07-10 DIAGNOSIS — Z87.891 HISTORY OF TOBACCO USE: ICD-10-CM

## 2025-07-10 DIAGNOSIS — Z01.419 WELL WOMAN EXAM WITH ROUTINE GYNECOLOGICAL EXAM: Primary | ICD-10-CM

## 2025-07-10 DIAGNOSIS — Z12.31 ENCOUNTER FOR SCREENING MAMMOGRAM FOR MALIGNANT NEOPLASM OF BREAST: ICD-10-CM

## 2025-07-10 DIAGNOSIS — R31.0 GROSS HEMATURIA: ICD-10-CM

## 2025-07-10 DIAGNOSIS — Z80.52 FAMILY HISTORY OF BLADDER CANCER: ICD-10-CM

## 2025-07-10 PROCEDURE — 99396 PREV VISIT EST AGE 40-64: CPT | Performed by: PHYSICIAN ASSISTANT

## 2025-07-10 NOTE — PROGRESS NOTES
ASSESSMENT & PLAN:   Diagnoses and all orders for this visit:    Well woman exam with routine gynecological exam    Encounter for screening mammogram for malignant neoplasm of breast  -     Mammo screening bilateral w 3d and cad; Future    Gross hematuria  -     Ambulatory Referral to Urology; Future    Family history of bladder cancer  Comments:  pt father  Orders:  -     Ambulatory Referral to Urology; Future    History of tobacco use  Comments:  many years ago. stopped in the 80s  Orders:  -     Ambulatory Referral to Urology; Future          The following were reviewed in today's visit: ASCCP guidelines, breast self exam, mammography screening ordered, and STD testing.    Patient to return to office in yearly for annual exam.     All questions have been answered to her satisfaction.        CC:  Annual Gynecologic Examination  Chief Complaint   Patient presents with    Gynecologic Exam     The patient is here for a yearly exam. pap w/hpv 2023neg/neg  mammo 5/15/23  Colonoscopy- normal-repeat in 5 years 23.     No bleeding or cramping.   The patient has vaginal burning externally. No vaginal bumps or rashes.   No bleeding or itching.   The patient has been using all natural products.   No vaginal discharge or odor.   The patient has breast pain on and off. She has a history of breast cysts.   No bowel or bladder issues.          HPI: Lennie Salinas is a 59 y.o.  who presents for annual gynecologic examination.  She has the following concerns:    Intermittent blood in urine. Notices pink when wiping after urinating. No vaginal bleeding. No painful urination. Occult blood seen on UA but cultures negative for infection. Has not seen urology.       Health Maintenance:    Exercise: active  Breast exams/breast awareness: yes  Last mammogram:   Colorectal cancer screenin    Past Medical History[1]    Past Surgical History[2]    Past OB/Gyn History:   Patient's last menstrual period was  2021 (approximate).    Menopausal status: postmenopausal  Menopausal symptoms: None    Last Pap:  : no abnormalities  History of abnormal Pap smear: yes - ascus 2016    Patient is currently sexually active.   STD testing: no  Current contraception: post menopausal status      Family History  Family History[3]    Family history of uterine or ovarian cancer: no  Family history of breast cancer: yes  Family history of colon cancer: yes    Social History:  Social History     Socioeconomic History    Marital status: /Civil Union     Spouse name: Not on file    Number of children: Not on file    Years of education: Not on file    Highest education level: Not on file   Occupational History    Not on file   Tobacco Use    Smoking status: Former     Current packs/day: 0.00     Average packs/day: 1 pack/day for 8.0 years (8.0 ttl pk-yrs)     Types: Cigarettes     Start date: 1980     Quit date: 1987     Years since quittin.5     Passive exposure: Never    Smokeless tobacco: Never    Tobacco comments:     off and on not constant when I was young   Vaping Use    Vaping status: Never Used   Substance and Sexual Activity    Alcohol use: No    Drug use: Not Currently     Types: Marijuana     Comment: tried it when I was younger    Sexual activity: Yes     Partners: Male     Birth control/protection: Condom Male, Post-menopausal   Other Topics Concern    Not on file   Social History Narrative    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   Respiratory:  Negative for shortness of breath.    Cardiovascular:  Negative for chest pain.   Gastrointestinal:  Negative for abdominal pain, diarrhea, nausea and vomiting.   Skin:  Negative for rash.   Psychiatric/Behavioral:  Negative for dysphoric mood. The patient is not nervous/anxious.          Physical Exam:  /70 (BP Location: Right arm, Patient Position: Sitting, Cuff Size: Standard)   Ht 5' 2\" (1.575 m)   Wt 43.1 kg (95 lb)   LMP 02/27/2021 (Approximate)   BMI 17.38 kg/m²    Physical Exam  Constitutional:       Appearance: Normal appearance.   Genitourinary:      Vulva and urethral meatus normal.      No lesions in the vagina.      Right Labia: No rash or lesions.     Left Labia: No lesions or rash.     No vaginal discharge, erythema or bleeding.      Moderate vaginal atrophy present.       Right Adnexa: not tender and no mass present.     Left Adnexa: not tender and no mass present.     No cervical discharge or lesion.      Uterus is not tender.      Urethral meatus caruncle not present.     No urethral prolapse or mass present.   Breasts:     Breasts are symmetrical.      Right: No mass or skin change.      Left: No mass or skin change.   HENT:      Head: Normocephalic and atraumatic.     Cardiovascular:      Rate and Rhythm: Normal rate and regular rhythm.      Heart sounds: Normal heart sounds. No murmur heard.     No friction rub. No gallop.   Pulmonary:      Effort: Pulmonary effort is normal.      Breath sounds: Normal breath sounds. No wheezing, rhonchi or rales.   Abdominal:      General: Abdomen is flat. There is no distension.      Palpations: Abdomen is soft.      Tenderness: There is no abdominal tenderness.     Musculoskeletal:      Cervical back: Neck supple.   Lymphadenopathy:      Upper Body:      Right upper body: No axillary adenopathy.      Left upper body: No axillary adenopathy.     Neurological:      General: No " focal deficit present.      Mental Status: She is alert.     Skin:     General: Skin is warm and dry.     Psychiatric:         Mood and Affect: Mood normal.         Behavior: Behavior normal.   Vitals reviewed.                                     [1]   Past Medical History:  Diagnosis Date    Allergic     Costochondritis 05/31/2018    CTS (carpal tunnel syndrome)     cubital tunnel    Degenerative tear of left medial meniscus     Dystonia 04/28/2016    Ear problems     Fatigue     GERD (gastroesophageal reflux disease)     Hypertension     usually low but spikes    Lactose intolerance     Lump of ear     on her partoid gland    Miscarriage 1999, 2000    Osteoarthritis     Sleep apnea     have sleep issues    Tinnitus     TMJ dysfunction     Urinary tract infection comes and goes blood in urine    Visual impairment posterior vitreous detachment    2020   [2]   Past Surgical History:  Procedure Laterality Date    BLADDER SURGERY      COLONOSCOPY      DILATION AND CURETTAGE OF UTERUS      Miscarriage x2    HERNIA REPAIR      inguinal, left    SD COLONOSCOPY FLX DX W/COLLJ SPEC WHEN PFRMD N/A 09/12/2018    Procedure: EGD AND COLONOSCOPY;  Surgeon: Nash Sanchez MD;  Location: [facility];  Service: Gastroenterology    TONSILLECTOMY      UPPER GASTROINTESTINAL ENDOSCOPY     [3]   Family History  Problem Relation Name Age of Onset    Hyperlipidemia Mother Russ     Hypertension Mother Russ     Liver disease Mother Russ     Pancreatic cancer Mother Russ     Mental illness Mother Russ     Cancer Mother Russ         Pancreas & MEN-1    Stroke Mother Russ         mom had mini stroke    Colon cancer Father erika 70    Cancer Father erika         bladder cancer    Heart disease Maternal Grandmother zeina     Dementia Maternal Grandmother zeina     Arthritis Maternal Grandmother zeina     Heart disease Maternal Grandfather kyle     Skin cancer Paternal Grandmother pam     Breast cancer Paternal Grandmother pam         skin,bone &  breast    Kidney disease Paternal Grandfather vincent     Hypertension Brother andrew     Alcohol abuse Brother andrew     Mental illness Brother andrew     ADD / ADHD Brother reji     OCD Brother reji     Heart disease Maternal Uncle león     Diabetes Maternal Uncle león     Dementia Maternal Uncle león         clicked wrong name    Autoimmune disease Paternal Aunt dyana         MS    Multiple sclerosis Paternal Aunt dyana     Autoimmune disease Paternal Aunt roberta         Lupus    No Known Problems Paternal Aunt      No Known Problems Maternal Aunt      Stroke Maternal Aunt evin         had mini stroke    Stroke Maternal Aunt evin         had mini stroke    Autoimmune disease Paternal Aunt dyana         MS    Autoimmune disease Paternal Aunt roberta         Lupus    ADD / ADHD Brother reji     OCD Brother reji     Substance Abuse Neg Hx      Depression Neg Hx     [4]   Allergies  Allergen Reactions    Sulfa Antibiotics Hives    Kenalog [Triamcinolone Acetonide] Hives    Milk-Related Compounds - Food Allergy GI Intolerance and Nasal Congestion    Neomycin Hives    Penicillins      Reaction Date: 12Nov2009;     Azithromycin Rash    Levaquin [Levofloxacin] Tachycardia   [5]   Current Outpatient Medications:     Cholecalciferol (VITAMIN D-3 PO), Take by mouth in the morning, Disp: , Rfl:     D-MANNOSE PO, Take by mouth, Disp: , Rfl:     NON FORMULARY, Femdophilus, Disp: , Rfl:     NON FORMULARY, Take 1 tablet by mouth in the morning Natures balance fruit and vegies, Disp: , Rfl:     Probiotic Product (Cardoz PO), Take by mouth, Disp: , Rfl:     vitamin B-12 (CYANOCOBALAMIN) 50 MCG tablet, Take 50 mcg by mouth in the morning., Disp: , Rfl:

## 2025-07-14 ENCOUNTER — PROCEDURE VISIT (OUTPATIENT)
Dept: FAMILY MEDICINE CLINIC | Facility: CLINIC | Age: 60
End: 2025-07-14
Payer: COMMERCIAL

## 2025-07-14 DIAGNOSIS — M99.08 SOMATIC DYSFUNCTION OF RIB CAGE REGION: ICD-10-CM

## 2025-07-14 DIAGNOSIS — M99.00 SOMATIC DYSFUNCTION OF HEAD REGION: ICD-10-CM

## 2025-07-14 DIAGNOSIS — M25.511 ACUTE PAIN OF RIGHT SHOULDER: ICD-10-CM

## 2025-07-14 DIAGNOSIS — M54.12 CERVICAL RADICULOPATHY: Primary | ICD-10-CM

## 2025-07-14 DIAGNOSIS — M99.01 SOMATIC DYSFUNCTION OF CERVICAL REGION: ICD-10-CM

## 2025-07-14 DIAGNOSIS — M99.07 SOMATIC DYSFUNCTION OF UPPER EXTREMITY: ICD-10-CM

## 2025-07-14 DIAGNOSIS — M99.02 SOMATIC DYSFUNCTION OF THORACIC REGION: ICD-10-CM

## 2025-07-14 PROCEDURE — 98927 OSTEOPATH MANJ 5-6 REGIONS: CPT | Performed by: FAMILY MEDICINE

## 2025-07-14 NOTE — PROGRESS NOTES
The Assessment & Plan     This is a 59 y.o. female who presents for OMT follow-up for:  1. Cervical radiculopathy  OMT      2. Acute pain of right shoulder  OMT      3. Somatic dysfunction of head region  OMT      4. Somatic dysfunction of cervical region  OMT      5. Somatic dysfunction of thoracic region  OMT      6. Somatic dysfunction of rib cage region  OMT      7. Somatic dysfunction of upper extremity  OMT           1. Patient tolerated OMT well for the above problems,  advised patient to drink fluids and can use NSAID for soreness after treatment     2. OMT Follow up in 3 weeks.    Subjective     Lennie Salinas is a 59 y.o. female and is here for a OMT follow up. The patient reports she has right-sided neck and upper back pain today.  Notes that have been doing better after her last OMT however has tightened up.  Denies any injury.  Does note she continues to be very active likely flared this up.  She is doing home exercises which does help however very tight today.  Denies any numbness or tingling into her hand.  Denies any weakness.    Is the patient taking Pain medication? no  Has the patient completed physical therapy for this condition? no  Did Patient symptoms improve from last OMT appointment? yes    The following portions of the patient's history were reviewed and updated as appropriate: allergies, current medications, past family history, past medical history, past social history, past surgical history, and problem list.    Review of Systems  Review of Systems   Constitutional:  Negative for chills and fever.   HENT:  Negative for ear pain and sore throat.    Eyes:  Negative for pain and visual disturbance.   Respiratory:  Negative for cough and shortness of breath.    Cardiovascular:  Negative for chest pain and palpitations.   Gastrointestinal:  Negative for abdominal pain and vomiting.   Genitourinary:  Negative for dysuria and hematuria.   Musculoskeletal:  Positive for back pain and neck pain.  Negative for arthralgias.   Skin:  Negative for color change and rash.   Neurological:  Negative for seizures and syncope.   Psychiatric/Behavioral:  Negative for confusion and sleep disturbance. The patient is not nervous/anxious.    All other systems reviewed and are negative.        Objective     OMT Exam     OMT    Performed by: See Douglas DO  Authorized by: See Douglas DO    Universal Protocol:  procedure performed by consultantConsent: Verbal consent obtained  Consent given by: patient  Patient identity confirmed: verbally with patient      Procedure Details:     Region evaluated and treated:  Head, Cervical, Thoracic, Ribs and Right Extremities    Extremity Information  Extremities: right upper extremity    Thoracic Information  Thoracic Region: T1 - T4  Head Details:     Examination Method:  Tissue Texture Change, Stability, Laxity, Effusions, Tone, Range of Motion, Contracture, Asymmetry, Misalignment, Crepitation, Defects, Masses and Tenderness, Pain    Severity:  Moderate    Osteopathic Findings:  - OA FSRRL     Treatment Method:  Muscle Energy Treatment, Myofascial Release Treatment and Soft Tissue Treatment    Response:  Improved - The somatic dysfunction is improved but not completely resolved.    Cervical Details:     Examination Method:  Tissue Texture Change, Stability, Laxity, Effusions, Tone, Range of Motion, Contracture, Asymmetry, Misalignment, Crepitation, Defects, Masses and Tenderness, Pain    Severity:  Moderate    Osteopathic Findings:  Bilateral para cervical muscle hypertonicity, right greater than left  Bilateral scalene muscle hypertonicity, right greater than left  Right: C3, C7 tender points    Treatment Method:  Counterstrain Treatment, Muscle Energy Treatment, Myofascial Release Treatment and Soft Tissue Treatment    Response:  Improved - The somatic dysfunction is improved but not completely resolved.    Thoracic T1 - T4 details:     Examination Method:  Tissue Texture  Change, Stability, Laxity, Effusions, Tone, Range of Motion, Contracture, Asymmetry, Misalignment, Crepitation, Defects, Masses and Tenderness, Pain    Severity:  Moderate    Osteopathic Findings:  Bilateral paraspinal muscle hypertonicity, right greater than left  Right rhomboid hypertonicity and tender point noted    Treatment Method:  Counterstrain Treatment, Muscle Energy Treatment, Soft Tissue Treatment and Myofascial Release Treatment    Response:  Improved    Right Upper Extremity details:     Examination Method:  Tissue Texture Change, Stability, Laxity, Effusions, Tone, Range of Motion, Contracture, Asymmetry, Misalignment, Crepitation, Defects, Masses and Tenderness, Pain    Severity:  Moderate    Osteopathic Findings:  Decreased internal rotation of the shoulder, right deltoid hypertonicity, medial deltoid tender point.    Treatment Method:  Articulatory Treatment, Counterstrain Treatment, Muscle Energy Treatment and Soft Tissue Treatment    Response:  Improved - The somatic dysfunction is improved but not completely resolved.    Ribs details:     Examination Method:  Tissue Texture Change, Stability, Laxity, Effusions, Tone, Range of Motion, Contracture, Asymmetry, Misalignment, Crepitation, Defects, Masses and Tenderness, Pain    Severity:  Moderate    Osteopathic Findings:  Right first rib elevation and tender point    Treatment Method:  Articulatory Treatment, Muscle Energy Treatment and Soft Tissue Treatment    Response:  Improved - The somatic dysfunction is improved but not completely resolved.    Total Regions Treated:  5  Bilateral paracervical muscle hypertonicity, right greater than left

## 2025-07-16 ENCOUNTER — TELEPHONE (OUTPATIENT)
Age: 60
End: 2025-07-16

## 2025-07-16 NOTE — TELEPHONE ENCOUNTER
New Patient      Insurance   Current Insurance? Yes    Insurance E-verified? Yes     History   Reason for appointment/active symptoms?  NP- ref for Gross hematuria;Family history of bladder cancer;History of tobacco use     Intermittent blood when wiping. Otherwise microscopic per patient.      Has the patient had any previous Urologist(s)? Yes years ago but unsure of who she saw      Was the patient seen in the ED? No      Labs/Imaging(Including Out Of Network)? In chart.      Records Requested? No    Records Visible in EPIC? Yes       Personal history of cancer? No     -Family history of father having bladder cancer      Appointment   Office location preference:    ?   Appointment Details   Date:  8/26/25  Time:  1:40 pm   Location:  [place]   Provider:  Lainey   Does the appointment need further review? Due to patients family history of father having bladder cancer I am not sure if apt should be moved up? Please review and advise.

## 2025-07-16 NOTE — TELEPHONE ENCOUNTER
Timeframe appropriate.  Can plan to assess patient in the office in order imaging for gross hematuria workup.  Do not need to move to an MD slot for cystoscopy.

## 2025-08-12 ENCOUNTER — PROCEDURE VISIT (OUTPATIENT)
Dept: FAMILY MEDICINE CLINIC | Facility: CLINIC | Age: 60
End: 2025-08-12
Payer: COMMERCIAL

## 2025-08-12 DIAGNOSIS — M99.02 SOMATIC DYSFUNCTION OF THORACIC REGION: ICD-10-CM

## 2025-08-12 DIAGNOSIS — M99.08 SOMATIC DYSFUNCTION OF RIB CAGE REGION: ICD-10-CM

## 2025-08-12 DIAGNOSIS — M99.01 SOMATIC DYSFUNCTION OF CERVICAL REGION: ICD-10-CM

## 2025-08-12 DIAGNOSIS — M54.12 CERVICAL RADICULOPATHY: Primary | ICD-10-CM

## 2025-08-12 DIAGNOSIS — M99.00 SOMATIC DYSFUNCTION OF HEAD REGION: ICD-10-CM

## 2025-08-12 DIAGNOSIS — M99.07 SOMATIC DYSFUNCTION OF UPPER EXTREMITY: ICD-10-CM

## 2025-08-12 PROCEDURE — 98927 OSTEOPATH MANJ 5-6 REGIONS: CPT | Performed by: FAMILY MEDICINE

## 2025-08-22 ENCOUNTER — HOSPITAL ENCOUNTER (OUTPATIENT)
Dept: RADIOLOGY | Age: 60
Discharge: HOME | End: 2025-08-22
Attending: PHYSICIAN ASSISTANT
Payer: COMMERCIAL

## 2025-08-22 VITALS — HEIGHT: 62 IN | BODY MASS INDEX: 17.11 KG/M2 | WEIGHT: 93 LBS

## 2025-08-22 DIAGNOSIS — Z12.31 ENCOUNTER FOR SCREENING MAMMOGRAM FOR MALIGNANT NEOPLASM OF BREAST: ICD-10-CM

## 2025-08-22 PROCEDURE — 77063 BREAST TOMOSYNTHESIS BI: CPT

## 2025-08-22 PROCEDURE — 77067 SCR MAMMO BI INCL CAD: CPT
